# Patient Record
Sex: FEMALE | Race: WHITE | Employment: OTHER | URBAN - METROPOLITAN AREA
[De-identification: names, ages, dates, MRNs, and addresses within clinical notes are randomized per-mention and may not be internally consistent; named-entity substitution may affect disease eponyms.]

---

## 2017-01-06 ENCOUNTER — TELEPHONE (OUTPATIENT)
Dept: INTERNAL MEDICINE CLINIC | Age: 77
End: 2017-01-06

## 2017-01-06 DIAGNOSIS — E78.2 MIXED HYPERLIPIDEMIA: ICD-10-CM

## 2017-01-06 DIAGNOSIS — I25.10 CORONARY ARTERY DISEASE INVOLVING NATIVE CORONARY ARTERY OF NATIVE HEART WITHOUT ANGINA PECTORIS: ICD-10-CM

## 2017-02-13 ENCOUNTER — HOSPITAL ENCOUNTER (OUTPATIENT)
Dept: GENERAL RADIOLOGY | Age: 77
Discharge: HOME | End: 2017-02-13
Attending: FAMILY MEDICINE | Admitting: FAMILY MEDICINE

## 2017-02-13 DIAGNOSIS — M53.3 SACROILIAC PAIN: ICD-10-CM

## 2017-02-13 DIAGNOSIS — Z78.0 POSTMENOPAUSAL: ICD-10-CM

## 2017-02-13 DIAGNOSIS — R20.9 DISTURBANCE OF SKIN SENSATION: ICD-10-CM

## 2017-02-14 ENCOUNTER — TELEPHONE (OUTPATIENT)
Dept: INTERNAL MEDICINE CLINIC | Age: 77
End: 2017-02-14

## 2017-03-10 ENCOUNTER — OFFICE VISIT (OUTPATIENT)
Dept: CARDIOLOGY CLINIC | Age: 77
End: 2017-03-10

## 2017-03-10 VITALS
HEART RATE: 77 BPM | WEIGHT: 227 LBS | HEIGHT: 60 IN | BODY MASS INDEX: 44.57 KG/M2 | SYSTOLIC BLOOD PRESSURE: 130 MMHG | DIASTOLIC BLOOD PRESSURE: 68 MMHG

## 2017-03-10 DIAGNOSIS — I25.10 CORONARY ARTERY DISEASE INVOLVING NATIVE CORONARY ARTERY OF NATIVE HEART WITHOUT ANGINA PECTORIS: Primary | ICD-10-CM

## 2017-03-10 DIAGNOSIS — E78.2 MIXED HYPERLIPIDEMIA: ICD-10-CM

## 2017-03-10 DIAGNOSIS — F17.200 TOBACCO DEPENDENCE: ICD-10-CM

## 2017-03-10 DIAGNOSIS — I10 ESSENTIAL HYPERTENSION: ICD-10-CM

## 2017-03-10 PROCEDURE — G8399 PT W/DXA RESULTS DOCUMENT: HCPCS | Performed by: INTERNAL MEDICINE

## 2017-03-10 PROCEDURE — 4040F PNEUMOC VAC/ADMIN/RCVD: CPT | Performed by: INTERNAL MEDICINE

## 2017-03-10 PROCEDURE — G8484 FLU IMMUNIZE NO ADMIN: HCPCS | Performed by: INTERNAL MEDICINE

## 2017-03-10 PROCEDURE — 1123F ACP DISCUSS/DSCN MKR DOCD: CPT | Performed by: INTERNAL MEDICINE

## 2017-03-10 PROCEDURE — G8417 CALC BMI ABV UP PARAM F/U: HCPCS | Performed by: INTERNAL MEDICINE

## 2017-03-10 PROCEDURE — 4004F PT TOBACCO SCREEN RCVD TLK: CPT | Performed by: INTERNAL MEDICINE

## 2017-03-10 PROCEDURE — G8598 ASA/ANTIPLAT THER USED: HCPCS | Performed by: INTERNAL MEDICINE

## 2017-03-10 PROCEDURE — 99214 OFFICE O/P EST MOD 30 MIN: CPT | Performed by: INTERNAL MEDICINE

## 2017-03-10 PROCEDURE — G8427 DOCREV CUR MEDS BY ELIG CLIN: HCPCS | Performed by: INTERNAL MEDICINE

## 2017-03-10 PROCEDURE — 1090F PRES/ABSN URINE INCON ASSESS: CPT | Performed by: INTERNAL MEDICINE

## 2017-03-10 RX ORDER — METOPROLOL TARTRATE 50 MG/1
50 TABLET, FILM COATED ORAL 2 TIMES DAILY
Qty: 180 TABLET | Refills: 3 | Status: CANCELLED | OUTPATIENT
Start: 2017-03-10

## 2017-03-15 ENCOUNTER — TELEPHONE (OUTPATIENT)
Dept: INTERNAL MEDICINE CLINIC | Age: 77
End: 2017-03-15

## 2017-03-16 ENCOUNTER — OFFICE VISIT (OUTPATIENT)
Dept: INTERNAL MEDICINE CLINIC | Age: 77
End: 2017-03-16

## 2017-03-16 VITALS
DIASTOLIC BLOOD PRESSURE: 72 MMHG | BODY MASS INDEX: 38.93 KG/M2 | HEIGHT: 64 IN | WEIGHT: 228 LBS | SYSTOLIC BLOOD PRESSURE: 124 MMHG | HEART RATE: 72 BPM

## 2017-03-16 DIAGNOSIS — E53.8 B12 DEFICIENCY: ICD-10-CM

## 2017-03-16 DIAGNOSIS — R42 VERTIGO: ICD-10-CM

## 2017-03-16 DIAGNOSIS — E55.9 VITAMIN D DEFICIENCY: ICD-10-CM

## 2017-03-16 DIAGNOSIS — E78.2 MIXED HYPERLIPIDEMIA: ICD-10-CM

## 2017-03-16 DIAGNOSIS — R73.9 HYPERGLYCEMIA: Primary | ICD-10-CM

## 2017-03-16 DIAGNOSIS — R07.89 CHEST WALL PAIN FOLLOWING SURGERY: ICD-10-CM

## 2017-03-16 DIAGNOSIS — F17.200 TOBACCO DEPENDENCE: ICD-10-CM

## 2017-03-16 DIAGNOSIS — R05.3 CHRONIC COUGH: ICD-10-CM

## 2017-03-16 DIAGNOSIS — G89.18 CHEST WALL PAIN FOLLOWING SURGERY: ICD-10-CM

## 2017-03-16 PROCEDURE — 4004F PT TOBACCO SCREEN RCVD TLK: CPT | Performed by: FAMILY MEDICINE

## 2017-03-16 PROCEDURE — G8399 PT W/DXA RESULTS DOCUMENT: HCPCS | Performed by: FAMILY MEDICINE

## 2017-03-16 PROCEDURE — 4040F PNEUMOC VAC/ADMIN/RCVD: CPT | Performed by: FAMILY MEDICINE

## 2017-03-16 PROCEDURE — 1123F ACP DISCUSS/DSCN MKR DOCD: CPT | Performed by: FAMILY MEDICINE

## 2017-03-16 PROCEDURE — G8484 FLU IMMUNIZE NO ADMIN: HCPCS | Performed by: FAMILY MEDICINE

## 2017-03-16 PROCEDURE — G8417 CALC BMI ABV UP PARAM F/U: HCPCS | Performed by: FAMILY MEDICINE

## 2017-03-16 PROCEDURE — G8598 ASA/ANTIPLAT THER USED: HCPCS | Performed by: FAMILY MEDICINE

## 2017-03-16 PROCEDURE — G8427 DOCREV CUR MEDS BY ELIG CLIN: HCPCS | Performed by: FAMILY MEDICINE

## 2017-03-16 PROCEDURE — 99213 OFFICE O/P EST LOW 20 MIN: CPT | Performed by: FAMILY MEDICINE

## 2017-03-16 PROCEDURE — 1090F PRES/ABSN URINE INCON ASSESS: CPT | Performed by: FAMILY MEDICINE

## 2017-03-16 RX ORDER — CLONAZEPAM 0.5 MG/1
TABLET ORAL
Qty: 135 TABLET | Refills: 0 | Status: SHIPPED | OUTPATIENT
Start: 2017-03-16 | End: 2017-06-08 | Stop reason: SDUPTHER

## 2017-03-16 RX ORDER — LANOLIN ALCOHOL/MO/W.PET/CERES
1000 CREAM (GRAM) TOPICAL DAILY
COMMUNITY
End: 2017-03-18 | Stop reason: DRUGHIGH

## 2017-03-16 RX ORDER — BENZONATATE 100 MG/1
100 CAPSULE ORAL 3 TIMES DAILY PRN
Qty: 90 CAPSULE | Refills: 3 | Status: SHIPPED | OUTPATIENT
Start: 2017-03-16 | End: 2017-11-27 | Stop reason: SDUPTHER

## 2017-03-18 PROBLEM — E53.8 B12 DEFICIENCY: Status: ACTIVE | Noted: 2017-03-18

## 2017-03-18 RX ORDER — LANOLIN ALCOHOL/MO/W.PET/CERES
2000 CREAM (GRAM) TOPICAL DAILY
Qty: 30 TABLET | Status: SHIPPED | COMMUNITY
Start: 2017-03-18

## 2017-03-18 ASSESSMENT — ENCOUNTER SYMPTOMS
WHEEZING: 0
SHORTNESS OF BREATH: 0
CHEST TIGHTNESS: 0
COUGH: 0

## 2017-03-21 RX ORDER — MECLIZINE HCL 12.5 MG/1
TABLET ORAL
Qty: 90 TABLET | Refills: 3 | Status: SHIPPED | OUTPATIENT
Start: 2017-03-21 | End: 2017-03-28 | Stop reason: SINTOL

## 2017-03-27 RX ORDER — METOPROLOL TARTRATE 50 MG/1
TABLET, FILM COATED ORAL
Qty: 180 TABLET | Refills: 3 | Status: SHIPPED | OUTPATIENT
Start: 2017-03-27 | End: 2018-03-22 | Stop reason: SDUPTHER

## 2017-03-28 ENCOUNTER — TELEPHONE (OUTPATIENT)
Dept: INTERNAL MEDICINE CLINIC | Age: 77
End: 2017-03-28

## 2017-04-26 RX ORDER — OMEPRAZOLE 20 MG/1
CAPSULE, DELAYED RELEASE ORAL
Qty: 90 CAPSULE | Refills: 2 | Status: SHIPPED | OUTPATIENT
Start: 2017-04-26 | End: 2018-01-21 | Stop reason: SDUPTHER

## 2017-06-02 DIAGNOSIS — E53.8 B12 DEFICIENCY: ICD-10-CM

## 2017-06-02 DIAGNOSIS — R73.9 HYPERGLYCEMIA: ICD-10-CM

## 2017-06-02 DIAGNOSIS — E55.9 VITAMIN D DEFICIENCY: ICD-10-CM

## 2017-06-02 DIAGNOSIS — E78.2 MIXED HYPERLIPIDEMIA: ICD-10-CM

## 2017-06-02 LAB
A/G RATIO: 1.5 (ref 1.1–2.2)
ALBUMIN SERPL-MCNC: 4.2 G/DL (ref 3.4–5)
ALP BLD-CCNC: 115 U/L (ref 40–129)
ALT SERPL-CCNC: 14 U/L (ref 10–40)
ANION GAP SERPL CALCULATED.3IONS-SCNC: 18 MMOL/L (ref 3–16)
AST SERPL-CCNC: 15 U/L (ref 15–37)
BASOPHILS ABSOLUTE: 0.1 K/UL (ref 0–0.2)
BASOPHILS RELATIVE PERCENT: 1 %
BILIRUB SERPL-MCNC: 0.5 MG/DL (ref 0–1)
BUN BLDV-MCNC: 19 MG/DL (ref 7–20)
CALCIUM SERPL-MCNC: 9.7 MG/DL (ref 8.3–10.6)
CHLORIDE BLD-SCNC: 102 MMOL/L (ref 99–110)
CHOLESTEROL, TOTAL: 169 MG/DL (ref 0–199)
CO2: 24 MMOL/L (ref 21–32)
CREAT SERPL-MCNC: 1.1 MG/DL (ref 0.6–1.2)
EOSINOPHILS ABSOLUTE: 0.3 K/UL (ref 0–0.6)
EOSINOPHILS RELATIVE PERCENT: 2.5 %
ESTIMATED AVERAGE GLUCOSE: 134.1 MG/DL
GFR AFRICAN AMERICAN: 58
GFR NON-AFRICAN AMERICAN: 48
GLOBULIN: 2.8 G/DL
GLUCOSE BLD-MCNC: 103 MG/DL (ref 70–99)
HBA1C MFR BLD: 6.3 %
HCT VFR BLD CALC: 42.6 % (ref 36–48)
HDLC SERPL-MCNC: 44 MG/DL (ref 40–60)
HEMOGLOBIN: 13.8 G/DL (ref 12–16)
LDL CHOLESTEROL CALCULATED: 88 MG/DL
LYMPHOCYTES ABSOLUTE: 3.6 K/UL (ref 1–5.1)
LYMPHOCYTES RELATIVE PERCENT: 27.9 %
MCH RBC QN AUTO: 28.3 PG (ref 26–34)
MCHC RBC AUTO-ENTMCNC: 32.3 G/DL (ref 31–36)
MCV RBC AUTO: 87.6 FL (ref 80–100)
MONOCYTES ABSOLUTE: 1.1 K/UL (ref 0–1.3)
MONOCYTES RELATIVE PERCENT: 8.9 %
NEUTROPHILS ABSOLUTE: 7.6 K/UL (ref 1.7–7.7)
NEUTROPHILS RELATIVE PERCENT: 59.7 %
PDW BLD-RTO: 15.8 % (ref 12.4–15.4)
PLATELET # BLD: 331 K/UL (ref 135–450)
PMV BLD AUTO: 8.9 FL (ref 5–10.5)
POTASSIUM SERPL-SCNC: 4.2 MMOL/L (ref 3.5–5.1)
RBC # BLD: 4.87 M/UL (ref 4–5.2)
SODIUM BLD-SCNC: 144 MMOL/L (ref 136–145)
TOTAL PROTEIN: 7 G/DL (ref 6.4–8.2)
TRIGL SERPL-MCNC: 187 MG/DL (ref 0–150)
VITAMIN B-12: >2000 PG/ML (ref 211–911)
VITAMIN D 25-HYDROXY: 21.2 NG/ML
VLDLC SERPL CALC-MCNC: 37 MG/DL
WBC # BLD: 12.8 K/UL (ref 4–11)

## 2017-06-08 ENCOUNTER — OFFICE VISIT (OUTPATIENT)
Dept: INTERNAL MEDICINE CLINIC | Age: 77
End: 2017-06-08

## 2017-06-08 VITALS
DIASTOLIC BLOOD PRESSURE: 72 MMHG | WEIGHT: 225 LBS | BODY MASS INDEX: 38.41 KG/M2 | HEIGHT: 64 IN | OXYGEN SATURATION: 97 % | HEART RATE: 72 BPM | SYSTOLIC BLOOD PRESSURE: 126 MMHG

## 2017-06-08 DIAGNOSIS — M15.9 PRIMARY OSTEOARTHRITIS INVOLVING MULTIPLE JOINTS: ICD-10-CM

## 2017-06-08 DIAGNOSIS — H01.002 BLEPHARITIS OF LOWER EYELIDS OF BOTH EYES, UNSPECIFIED TYPE: ICD-10-CM

## 2017-06-08 DIAGNOSIS — I25.10 CORONARY ARTERY DISEASE INVOLVING NATIVE CORONARY ARTERY OF NATIVE HEART WITHOUT ANGINA PECTORIS: ICD-10-CM

## 2017-06-08 DIAGNOSIS — Z23 NEED FOR PROPHYLACTIC VACCINATION AND INOCULATION AGAINST VARICELLA: ICD-10-CM

## 2017-06-08 DIAGNOSIS — E70.30 ALBINOIDISM (HCC): ICD-10-CM

## 2017-06-08 DIAGNOSIS — I10 ESSENTIAL HYPERTENSION: ICD-10-CM

## 2017-06-08 DIAGNOSIS — H01.005 BLEPHARITIS OF LOWER EYELIDS OF BOTH EYES, UNSPECIFIED TYPE: ICD-10-CM

## 2017-06-08 DIAGNOSIS — J31.0 CHRONIC RHINITIS: ICD-10-CM

## 2017-06-08 DIAGNOSIS — R42 VERTIGO: ICD-10-CM

## 2017-06-08 DIAGNOSIS — Z13.31 POSITIVE DEPRESSION SCREENING: ICD-10-CM

## 2017-06-08 DIAGNOSIS — R73.03 PRE-DIABETES: Primary | ICD-10-CM

## 2017-06-08 DIAGNOSIS — J44.9 CHRONIC OBSTRUCTIVE PULMONARY DISEASE, UNSPECIFIED COPD TYPE (HCC): ICD-10-CM

## 2017-06-08 DIAGNOSIS — R53.1 WEAKNESS: ICD-10-CM

## 2017-06-08 DIAGNOSIS — Z79.899 HIGH RISK MEDICATION USE: ICD-10-CM

## 2017-06-08 PROCEDURE — 4004F PT TOBACCO SCREEN RCVD TLK: CPT | Performed by: FAMILY MEDICINE

## 2017-06-08 PROCEDURE — 99214 OFFICE O/P EST MOD 30 MIN: CPT | Performed by: FAMILY MEDICINE

## 2017-06-08 PROCEDURE — G8427 DOCREV CUR MEDS BY ELIG CLIN: HCPCS | Performed by: FAMILY MEDICINE

## 2017-06-08 PROCEDURE — 4040F PNEUMOC VAC/ADMIN/RCVD: CPT | Performed by: FAMILY MEDICINE

## 2017-06-08 PROCEDURE — 3023F SPIROM DOC REV: CPT | Performed by: FAMILY MEDICINE

## 2017-06-08 PROCEDURE — G8598 ASA/ANTIPLAT THER USED: HCPCS | Performed by: FAMILY MEDICINE

## 2017-06-08 PROCEDURE — G8399 PT W/DXA RESULTS DOCUMENT: HCPCS | Performed by: FAMILY MEDICINE

## 2017-06-08 PROCEDURE — 1090F PRES/ABSN URINE INCON ASSESS: CPT | Performed by: FAMILY MEDICINE

## 2017-06-08 PROCEDURE — 1123F ACP DISCUSS/DSCN MKR DOCD: CPT | Performed by: FAMILY MEDICINE

## 2017-06-08 PROCEDURE — G8417 CALC BMI ABV UP PARAM F/U: HCPCS | Performed by: FAMILY MEDICINE

## 2017-06-08 PROCEDURE — G8926 SPIRO NO PERF OR DOC: HCPCS | Performed by: FAMILY MEDICINE

## 2017-06-08 PROCEDURE — G8431 POS CLIN DEPRES SCRN F/U DOC: HCPCS | Performed by: FAMILY MEDICINE

## 2017-06-08 PROCEDURE — G0444 DEPRESSION SCREEN ANNUAL: HCPCS | Performed by: FAMILY MEDICINE

## 2017-06-08 RX ORDER — FLUTICASONE PROPIONATE 50 MCG
1 SPRAY, SUSPENSION (ML) NASAL DAILY
Qty: 3 BOTTLE | Refills: 3 | Status: SHIPPED | OUTPATIENT
Start: 2017-06-08 | End: 2017-09-01 | Stop reason: SDUPTHER

## 2017-06-08 RX ORDER — ALBUTEROL SULFATE 90 UG/1
2 AEROSOL, METERED RESPIRATORY (INHALATION) EVERY 4 HOURS PRN
Qty: 1 INHALER | Refills: 0 | Status: SHIPPED | OUTPATIENT
Start: 2017-06-08 | End: 2019-03-07 | Stop reason: SDUPTHER

## 2017-06-08 RX ORDER — CLONAZEPAM 0.5 MG/1
TABLET ORAL
Qty: 135 TABLET | Refills: 0 | Status: SHIPPED | OUTPATIENT
Start: 2017-06-08 | End: 2017-09-01 | Stop reason: SDUPTHER

## 2017-06-08 ASSESSMENT — PATIENT HEALTH QUESTIONNAIRE - PHQ9
5. POOR APPETITE OR OVEREATING: 0
10. IF YOU CHECKED OFF ANY PROBLEMS, HOW DIFFICULT HAVE THESE PROBLEMS MADE IT FOR YOU TO DO YOUR WORK, TAKE CARE OF THINGS AT HOME, OR GET ALONG WITH OTHER PEOPLE: 2
1. LITTLE INTEREST OR PLEASURE IN DOING THINGS: 3
SUM OF ALL RESPONSES TO PHQ QUESTIONS 1-9: 6
3. TROUBLE FALLING OR STAYING ASLEEP: 0
4. FEELING TIRED OR HAVING LITTLE ENERGY: 3
9. THOUGHTS THAT YOU WOULD BE BETTER OFF DEAD, OR OF HURTING YOURSELF: 0
8. MOVING OR SPEAKING SO SLOWLY THAT OTHER PEOPLE COULD HAVE NOTICED. OR THE OPPOSITE, BEING SO FIGETY OR RESTLESS THAT YOU HAVE BEEN MOVING AROUND A LOT MORE THAN USUAL: 0
SUM OF ALL RESPONSES TO PHQ9 QUESTIONS 1 & 2: 3
6. FEELING BAD ABOUT YOURSELF - OR THAT YOU ARE A FAILURE OR HAVE LET YOURSELF OR YOUR FAMILY DOWN: 0
7. TROUBLE CONCENTRATING ON THINGS, SUCH AS READING THE NEWSPAPER OR WATCHING TELEVISION: 0

## 2017-06-08 ASSESSMENT — ENCOUNTER SYMPTOMS
SHORTNESS OF BREATH: 0
WHEEZING: 0
CHEST TIGHTNESS: 0
COUGH: 0

## 2017-06-11 PROBLEM — J44.9 CHRONIC OBSTRUCTIVE PULMONARY DISEASE (HCC): Status: ACTIVE | Noted: 2017-06-11

## 2017-06-11 ASSESSMENT — ENCOUNTER SYMPTOMS: BACK PAIN: 1

## 2017-09-01 ENCOUNTER — OFFICE VISIT (OUTPATIENT)
Dept: INTERNAL MEDICINE CLINIC | Age: 77
End: 2017-09-01

## 2017-09-01 VITALS
BODY MASS INDEX: 37.87 KG/M2 | DIASTOLIC BLOOD PRESSURE: 84 MMHG | HEART RATE: 68 BPM | WEIGHT: 220.6 LBS | SYSTOLIC BLOOD PRESSURE: 124 MMHG

## 2017-09-01 DIAGNOSIS — Z79.891 USE OF OPIATES FOR THERAPEUTIC PURPOSES: ICD-10-CM

## 2017-09-01 DIAGNOSIS — M15.9 PRIMARY OSTEOARTHRITIS INVOLVING MULTIPLE JOINTS: ICD-10-CM

## 2017-09-01 DIAGNOSIS — R42 VERTIGO: ICD-10-CM

## 2017-09-01 DIAGNOSIS — J31.0 CHRONIC RHINITIS: ICD-10-CM

## 2017-09-01 DIAGNOSIS — J44.9 CHRONIC OBSTRUCTIVE PULMONARY DISEASE, UNSPECIFIED COPD TYPE (HCC): ICD-10-CM

## 2017-09-01 DIAGNOSIS — H00.011 HORDEOLUM EXTERNUM OF RIGHT UPPER EYELID: Primary | ICD-10-CM

## 2017-09-01 PROCEDURE — G8417 CALC BMI ABV UP PARAM F/U: HCPCS | Performed by: FAMILY MEDICINE

## 2017-09-01 PROCEDURE — 4040F PNEUMOC VAC/ADMIN/RCVD: CPT | Performed by: FAMILY MEDICINE

## 2017-09-01 PROCEDURE — G8926 SPIRO NO PERF OR DOC: HCPCS | Performed by: FAMILY MEDICINE

## 2017-09-01 PROCEDURE — 1123F ACP DISCUSS/DSCN MKR DOCD: CPT | Performed by: FAMILY MEDICINE

## 2017-09-01 PROCEDURE — 1090F PRES/ABSN URINE INCON ASSESS: CPT | Performed by: FAMILY MEDICINE

## 2017-09-01 PROCEDURE — 99213 OFFICE O/P EST LOW 20 MIN: CPT | Performed by: FAMILY MEDICINE

## 2017-09-01 PROCEDURE — 4004F PT TOBACCO SCREEN RCVD TLK: CPT | Performed by: FAMILY MEDICINE

## 2017-09-01 PROCEDURE — G8399 PT W/DXA RESULTS DOCUMENT: HCPCS | Performed by: FAMILY MEDICINE

## 2017-09-01 PROCEDURE — G8427 DOCREV CUR MEDS BY ELIG CLIN: HCPCS | Performed by: FAMILY MEDICINE

## 2017-09-01 PROCEDURE — G8598 ASA/ANTIPLAT THER USED: HCPCS | Performed by: FAMILY MEDICINE

## 2017-09-01 PROCEDURE — 3023F SPIROM DOC REV: CPT | Performed by: FAMILY MEDICINE

## 2017-09-01 RX ORDER — FLUTICASONE PROPIONATE 50 MCG
1 SPRAY, SUSPENSION (ML) NASAL DAILY
Qty: 3 BOTTLE | Refills: 3 | Status: SHIPPED | OUTPATIENT
Start: 2017-09-01 | End: 2018-09-21 | Stop reason: SDUPTHER

## 2017-09-01 RX ORDER — CLONAZEPAM 0.5 MG/1
TABLET ORAL
Qty: 135 TABLET | Refills: 0 | Status: SHIPPED | OUTPATIENT
Start: 2017-09-01 | End: 2017-11-27 | Stop reason: SDUPTHER

## 2017-09-01 RX ORDER — ERYTHROMYCIN 5 MG/G
OINTMENT OPHTHALMIC
Qty: 1 TUBE | Refills: 1 | Status: SHIPPED | OUTPATIENT
Start: 2017-09-01 | End: 2017-09-11

## 2017-09-01 ASSESSMENT — ENCOUNTER SYMPTOMS
NAUSEA: 0
WHEEZING: 0
CHEST TIGHTNESS: 0
SHORTNESS OF BREATH: 0
CONSTIPATION: 0
COUGH: 0
DIARRHEA: 0
VOMITING: 0

## 2017-09-03 PROBLEM — Z79.891 USE OF OPIATES FOR THERAPEUTIC PURPOSES: Status: ACTIVE | Noted: 2017-09-03

## 2017-09-11 ENCOUNTER — OFFICE VISIT (OUTPATIENT)
Dept: CARDIOLOGY CLINIC | Age: 77
End: 2017-09-11

## 2017-09-11 VITALS
WEIGHT: 220 LBS | HEIGHT: 60 IN | SYSTOLIC BLOOD PRESSURE: 124 MMHG | DIASTOLIC BLOOD PRESSURE: 62 MMHG | HEART RATE: 69 BPM | BODY MASS INDEX: 43.19 KG/M2

## 2017-09-11 DIAGNOSIS — F17.200 TOBACCO DEPENDENCE: ICD-10-CM

## 2017-09-11 DIAGNOSIS — I10 ESSENTIAL HYPERTENSION: ICD-10-CM

## 2017-09-11 DIAGNOSIS — I25.10 CORONARY ARTERY DISEASE INVOLVING NATIVE CORONARY ARTERY OF NATIVE HEART WITHOUT ANGINA PECTORIS: Primary | ICD-10-CM

## 2017-09-11 DIAGNOSIS — E78.2 MIXED HYPERLIPIDEMIA: ICD-10-CM

## 2017-09-11 PROCEDURE — 4004F PT TOBACCO SCREEN RCVD TLK: CPT | Performed by: INTERNAL MEDICINE

## 2017-09-11 PROCEDURE — G8427 DOCREV CUR MEDS BY ELIG CLIN: HCPCS | Performed by: INTERNAL MEDICINE

## 2017-09-11 PROCEDURE — 1123F ACP DISCUSS/DSCN MKR DOCD: CPT | Performed by: INTERNAL MEDICINE

## 2017-09-11 PROCEDURE — G8417 CALC BMI ABV UP PARAM F/U: HCPCS | Performed by: INTERNAL MEDICINE

## 2017-09-11 PROCEDURE — 1090F PRES/ABSN URINE INCON ASSESS: CPT | Performed by: INTERNAL MEDICINE

## 2017-09-11 PROCEDURE — 99214 OFFICE O/P EST MOD 30 MIN: CPT | Performed by: INTERNAL MEDICINE

## 2017-09-11 PROCEDURE — G8399 PT W/DXA RESULTS DOCUMENT: HCPCS | Performed by: INTERNAL MEDICINE

## 2017-09-11 PROCEDURE — G8598 ASA/ANTIPLAT THER USED: HCPCS | Performed by: INTERNAL MEDICINE

## 2017-09-11 PROCEDURE — 4040F PNEUMOC VAC/ADMIN/RCVD: CPT | Performed by: INTERNAL MEDICINE

## 2017-10-10 RX ORDER — VALSARTAN AND HYDROCHLOROTHIAZIDE 320; 25 MG/1; MG/1
TABLET, FILM COATED ORAL
Qty: 90 TABLET | Refills: 1 | Status: SHIPPED | OUTPATIENT
Start: 2017-10-10 | End: 2018-04-08 | Stop reason: SDUPTHER

## 2017-10-19 ENCOUNTER — OFFICE VISIT (OUTPATIENT)
Dept: INTERNAL MEDICINE CLINIC | Age: 77
End: 2017-10-19

## 2017-10-19 VITALS
DIASTOLIC BLOOD PRESSURE: 78 MMHG | WEIGHT: 221 LBS | HEART RATE: 72 BPM | BODY MASS INDEX: 43.39 KG/M2 | SYSTOLIC BLOOD PRESSURE: 126 MMHG | HEIGHT: 60 IN

## 2017-10-19 DIAGNOSIS — Z01.818 PREOP EXAMINATION: Primary | ICD-10-CM

## 2017-10-19 DIAGNOSIS — H25.9 AGE-RELATED CATARACT OF BOTH EYES, UNSPECIFIED AGE-RELATED CATARACT TYPE: ICD-10-CM

## 2017-10-19 PROCEDURE — G8417 CALC BMI ABV UP PARAM F/U: HCPCS | Performed by: NURSE PRACTITIONER

## 2017-10-19 PROCEDURE — 1090F PRES/ABSN URINE INCON ASSESS: CPT | Performed by: NURSE PRACTITIONER

## 2017-10-19 PROCEDURE — 1123F ACP DISCUSS/DSCN MKR DOCD: CPT | Performed by: NURSE PRACTITIONER

## 2017-10-19 PROCEDURE — G8399 PT W/DXA RESULTS DOCUMENT: HCPCS | Performed by: NURSE PRACTITIONER

## 2017-10-19 PROCEDURE — G8427 DOCREV CUR MEDS BY ELIG CLIN: HCPCS | Performed by: NURSE PRACTITIONER

## 2017-10-19 PROCEDURE — 99214 OFFICE O/P EST MOD 30 MIN: CPT | Performed by: NURSE PRACTITIONER

## 2017-10-19 PROCEDURE — 4004F PT TOBACCO SCREEN RCVD TLK: CPT | Performed by: NURSE PRACTITIONER

## 2017-10-19 PROCEDURE — G8484 FLU IMMUNIZE NO ADMIN: HCPCS | Performed by: NURSE PRACTITIONER

## 2017-10-19 PROCEDURE — G8598 ASA/ANTIPLAT THER USED: HCPCS | Performed by: NURSE PRACTITIONER

## 2017-10-19 PROCEDURE — 4040F PNEUMOC VAC/ADMIN/RCVD: CPT | Performed by: NURSE PRACTITIONER

## 2017-10-19 NOTE — PROGRESS NOTES
Preoperative Consultation      Adelaida Young  YOB: 1940    Date of Service:  10/19/2017    This patient presents today at the request of the surgeon for a preoperative consultation. Surgeon: Dr. Telma Epstein  Indication for surgery: Bilateral cataract  Scheduled procedure: Bilateral cataract  Date of surgery: 10/25/17 (right), TBD (left)  Location of surgery: [facility]  Indicated/required preoperative testing: H&P  Smoker: Yes  Previous anesthesia complications: No    Family history of anesthesia complications: No  Loose, capped or false teeth: Yes - upper & lower full plates  Recent chest pain or SOB: No  Known Bleeding Risk: No recent or remote history of abnormal bleeding. Anticoagulant therapy- ASA  Personal or FH of DVT/PE: No    Patient objection to receiving blood products: No      Allergies   Allergen Reactions    Pcn [Penicillins] Hives    Tetanus Toxoids Itching    Statins Rash     Rash with Lipitor, Crestor, Zocor;  Probably pravastatin. Current Outpatient Prescriptions   Medication Sig Dispense Refill    valsartan-hydrochlorothiazide (DIOVAN-HCT) 320-25 MG per tablet TAKE 1 TABLET DAILY 90 tablet 1    fluticasone (FLONASE) 50 MCG/ACT nasal spray 1 spray by Nasal route daily 3 Bottle 3    clonazePAM (KLONOPIN) 0.5 MG tablet Take 1 tablet at night and 1/2 tablet during day as needed. 135 tablet 0    traMADol-acetaminophen (ULTRACET) 37.5-325 MG per tablet 1 every 4 hours as needed and if no response, can take 2 at once every 8 hours.  120 tablet 0    pitavastatin (LIVALO) 4 MG TABS tablet Take 1 tablet by mouth nightly 90 tablet 1    albuterol sulfate HFA (PROAIR HFA) 108 (90 BASE) MCG/ACT inhaler Inhale 2 puffs into the lungs every 4 hours as needed for Wheezing (cough, shortness breath or wheezing.) 1 Inhaler 0    omeprazole (PRILOSEC) 20 MG delayed release capsule TAKE 1 CAPSULE DAILY 90 capsule 2    metoprolol tartrate (LOPRESSOR) 50 MG tablet TAKE 1 TABLET TWICE A  tablet 3    vitamin B-12 (CYANOCOBALAMIN) 1000 MCG tablet Take 2 tablets by mouth daily 30 tablet     benzonatate (TESSALON PERLES) 100 MG capsule Take 1 capsule by mouth 3 times daily as needed for Cough 90 capsule 3    naproxen (NAPROSYN) 375 MG tablet Take 1 twice a day as needed for arthritis pain. Best to take with food. 60 tablet 2    aspirin 81 MG tablet Take 81 mg by mouth daily      nitroGLYCERIN (NITROSTAT) 0.4 MG SL tablet Place 1 tablet under the tongue every 5 minutes as needed for Chest pain 25 tablet 0    promethazine (PHENERGAN) 25 MG tablet Take 0.5 tablets by mouth every 6 hours as needed for Nausea 60 tablet 3    Omega-3 Krill Oil 300 MG CAPS Take 1 tablet by mouth daily        No current facility-administered medications for this visit.         Patient Active Problem List   Diagnosis    Pre-diabetes    Hyperlipidemia    [facility] [address]    CAD (coronary artery disease)    Hearing impaired    Vertigo    GERD (gastroesophageal reflux disease)    Intertrigo    Abnormal EKG    HTN (hypertension)    High risk medication use    History of melanoma    Iron deficiency anemia    Tobacco dependence    Chest wall pain following surgery    Sacroiliac pain    Chronic rhinitis    B12 deficiency    Blepharitis of lower eyelids of both eyes    Chronic obstructive pulmonary disease (HCC)    Use of opiates for therapeutic purposes       Past Medical History:   Diagnosis Date    [facility]ism ([facility] [address]     CAD (coronary artery disease)     COPD (chronic obstructive pulmonary disease) ([facility] [facility]     Diabetes mellitus ([facility] [address] [facility]     Hearing impaired     Hiatal hernia     Hyperlipidemia     true allergy to statins    Hypertension     Kidney stone 2/3/2013    Malignant melanoma ([facility] [address]     Osteoarthritis     Pneumonia     Stented coronary artery        Past Surgical History:   Procedure Laterality Date    COLONOSCOPY      CORONARY ANGIOPLASTY WITH STENT PLACEMENT      CORONARY ARTERY BYPASS GRAFT 03/17/15    Dr. Barba Sensor - x 3 LIMA-LAD, SV-OM2, SV-RCA    ENDOSCOPY, COLON, DIAGNOSTIC      HYSTERECTOMY  1987    KNEE SURGERY      SKIN BIOPSY      arm,back, upper left arm    SKIN CANCER EXCISION  <2000    Left upper arm;  Melanoma    STAPEDES SURGERY  [id number]    Dr. Missy Bustillos       Family History   Problem Relation Age of Onset    Cancer Other      very strong in family    Esophageal Cancer Son     Seizures Daughter          Review of Systems  A comprehensive review of systems was negative except for what was noted in the HPI. Physical Exam   Vitals:    10/19/17 1332   BP: 126/78   Pulse: 72   Weight: 221 lb (100.2 kg)   Height: 5' (1.524 m)        Constitutional: She is oriented to person, place, and time. She appears well-developed and well-nourished. No distress. HENT:   Head: Normocephalic and atraumatic. Mouth/Throat: Uvula is midline, oropharynx is clear and moist and mucous membranes are normal.   Eyes: Conjunctivae and EOM are normal.   Neck: Trachea normal and normal range of motion. Neck supple. Cardiovascular: Normal rate, regular rhythm, normal heart sounds and intact distal pulses. Pulmonary/Chest: Effort normal and breath sounds normal. No respiratory distress. She has no wheezes. She has no rales. Abdominal: Soft, non-tender. Bowel sounds are normal.   Musculoskeletal: She exhibits no edema and no tenderness. Neurological: She is alert and oriented to person, place, and time. Skin: Skin is warm and dry. No rash noted. No erythema. Psychiatric: She has a normal mood and affect.  Her behavior is normal.       EKG Interpretation: n/a  Lab Review:  Lab Results   Component Value Date     06/02/2017    K 4.2 06/02/2017     06/02/2017    CO2 24 06/02/2017    BUN 19 06/02/2017    CREATININE 1.1 06/02/2017    GLUCOSE 103 06/02/2017    CALCIUM 9.7 06/02/2017     Lab Results   Component Value Date    WBC 12.8 06/02/2017    HGB 13.8 06/02/2017    HCT 42.6 06/02/2017

## 2017-11-27 ENCOUNTER — OFFICE VISIT (OUTPATIENT)
Dept: INTERNAL MEDICINE CLINIC | Age: 77
End: 2017-11-27

## 2017-11-27 VITALS
DIASTOLIC BLOOD PRESSURE: 68 MMHG | WEIGHT: 219 LBS | HEART RATE: 64 BPM | HEIGHT: 60 IN | SYSTOLIC BLOOD PRESSURE: 110 MMHG | BODY MASS INDEX: 43 KG/M2

## 2017-11-27 DIAGNOSIS — E66.01 MORBID OBESITY (HCC): ICD-10-CM

## 2017-11-27 DIAGNOSIS — Z23 NEED FOR PNEUMOCOCCAL VACCINATION: ICD-10-CM

## 2017-11-27 DIAGNOSIS — J44.9 COPD, MILD (HCC): Primary | ICD-10-CM

## 2017-11-27 DIAGNOSIS — R73.09 IMPAIRED GLUCOSE METABOLISM: ICD-10-CM

## 2017-11-27 DIAGNOSIS — Z23 INFLUENZA VACCINE NEEDED: ICD-10-CM

## 2017-11-27 DIAGNOSIS — R10.9 FLANK PAIN: ICD-10-CM

## 2017-11-27 DIAGNOSIS — E78.2 MIXED HYPERLIPIDEMIA: ICD-10-CM

## 2017-11-27 DIAGNOSIS — I10 ESSENTIAL HYPERTENSION: ICD-10-CM

## 2017-11-27 DIAGNOSIS — R42 VERTIGO: ICD-10-CM

## 2017-11-27 DIAGNOSIS — Z79.891 USE OF OPIATES FOR THERAPEUTIC PURPOSES: ICD-10-CM

## 2017-11-27 DIAGNOSIS — R05.3 CHRONIC COUGH: ICD-10-CM

## 2017-11-27 DIAGNOSIS — F32.A DEPRESSION, UNSPECIFIED DEPRESSION TYPE: ICD-10-CM

## 2017-11-27 LAB
A/G RATIO: 1.6 (ref 1.1–2.2)
ALBUMIN SERPL-MCNC: 4.4 G/DL (ref 3.4–5)
ALP BLD-CCNC: 117 U/L (ref 40–129)
ALT SERPL-CCNC: 21 U/L (ref 10–40)
ANION GAP SERPL CALCULATED.3IONS-SCNC: 18 MMOL/L (ref 3–16)
AST SERPL-CCNC: 17 U/L (ref 15–37)
BACTERIA: ABNORMAL /HPF
BASOPHILS ABSOLUTE: 0.1 K/UL (ref 0–0.2)
BASOPHILS RELATIVE PERCENT: 0.9 %
BILIRUB SERPL-MCNC: 0.4 MG/DL (ref 0–1)
BILIRUBIN URINE: NEGATIVE
BLOOD, URINE: NEGATIVE
BUN BLDV-MCNC: 21 MG/DL (ref 7–20)
CALCIUM SERPL-MCNC: 9.9 MG/DL (ref 8.3–10.6)
CHLORIDE BLD-SCNC: 101 MMOL/L (ref 99–110)
CLARITY: ABNORMAL
CO2: 25 MMOL/L (ref 21–32)
COLOR: YELLOW
CREAT SERPL-MCNC: 1.1 MG/DL (ref 0.6–1.2)
EOSINOPHILS ABSOLUTE: 0.2 K/UL (ref 0–0.6)
EOSINOPHILS RELATIVE PERCENT: 1.9 %
EPITHELIAL CELLS, UA: 2 /HPF (ref 0–5)
GFR AFRICAN AMERICAN: 58
GFR NON-AFRICAN AMERICAN: 48
GLOBULIN: 2.7 G/DL
GLUCOSE BLD-MCNC: 126 MG/DL (ref 70–99)
GLUCOSE URINE: NEGATIVE MG/DL
HCT VFR BLD CALC: 42.6 % (ref 36–48)
HEMOGLOBIN: 14 G/DL (ref 12–16)
HYALINE CASTS: 1 /LPF (ref 0–8)
KETONES, URINE: NEGATIVE MG/DL
LDL CHOLESTEROL DIRECT: 103 MG/DL
LEUKOCYTE ESTERASE, URINE: NEGATIVE
LYMPHOCYTES ABSOLUTE: 4.3 K/UL (ref 1–5.1)
LYMPHOCYTES RELATIVE PERCENT: 32.6 %
MCH RBC QN AUTO: 29.1 PG (ref 26–34)
MCHC RBC AUTO-ENTMCNC: 32.9 G/DL (ref 31–36)
MCV RBC AUTO: 88.5 FL (ref 80–100)
MICROSCOPIC EXAMINATION: YES
MONOCYTES ABSOLUTE: 1.1 K/UL (ref 0–1.3)
MONOCYTES RELATIVE PERCENT: 8 %
NEUTROPHILS ABSOLUTE: 7.5 K/UL (ref 1.7–7.7)
NEUTROPHILS RELATIVE PERCENT: 56.6 %
NITRITE, URINE: NEGATIVE
PDW BLD-RTO: 15.8 % (ref 12.4–15.4)
PH UA: 5.5
PLATELET # BLD: 322 K/UL (ref 135–450)
PMV BLD AUTO: 8.6 FL (ref 5–10.5)
POTASSIUM SERPL-SCNC: 4.5 MMOL/L (ref 3.5–5.1)
PROTEIN UA: NEGATIVE MG/DL
RBC # BLD: 4.82 M/UL (ref 4–5.2)
RBC UA: 6 /HPF (ref 0–4)
SODIUM BLD-SCNC: 144 MMOL/L (ref 136–145)
SPECIFIC GRAVITY UA: 1.01
TOTAL PROTEIN: 7.1 G/DL (ref 6.4–8.2)
UROBILINOGEN, URINE: 0.2 E.U./DL
WBC # BLD: 13.3 K/UL (ref 4–11)
WBC UA: 2 /HPF (ref 0–5)

## 2017-11-27 PROCEDURE — 4040F PNEUMOC VAC/ADMIN/RCVD: CPT | Performed by: FAMILY MEDICINE

## 2017-11-27 PROCEDURE — G8399 PT W/DXA RESULTS DOCUMENT: HCPCS | Performed by: FAMILY MEDICINE

## 2017-11-27 PROCEDURE — G8484 FLU IMMUNIZE NO ADMIN: HCPCS | Performed by: FAMILY MEDICINE

## 2017-11-27 PROCEDURE — 1123F ACP DISCUSS/DSCN MKR DOCD: CPT | Performed by: FAMILY MEDICINE

## 2017-11-27 PROCEDURE — G8926 SPIRO NO PERF OR DOC: HCPCS | Performed by: FAMILY MEDICINE

## 2017-11-27 PROCEDURE — 4004F PT TOBACCO SCREEN RCVD TLK: CPT | Performed by: FAMILY MEDICINE

## 2017-11-27 PROCEDURE — G8598 ASA/ANTIPLAT THER USED: HCPCS | Performed by: FAMILY MEDICINE

## 2017-11-27 PROCEDURE — 90732 PPSV23 VACC 2 YRS+ SUBQ/IM: CPT | Performed by: FAMILY MEDICINE

## 2017-11-27 PROCEDURE — G0009 ADMIN PNEUMOCOCCAL VACCINE: HCPCS | Performed by: FAMILY MEDICINE

## 2017-11-27 PROCEDURE — 3023F SPIROM DOC REV: CPT | Performed by: FAMILY MEDICINE

## 2017-11-27 PROCEDURE — G8427 DOCREV CUR MEDS BY ELIG CLIN: HCPCS | Performed by: FAMILY MEDICINE

## 2017-11-27 PROCEDURE — G8417 CALC BMI ABV UP PARAM F/U: HCPCS | Performed by: FAMILY MEDICINE

## 2017-11-27 PROCEDURE — G0008 ADMIN INFLUENZA VIRUS VAC: HCPCS | Performed by: FAMILY MEDICINE

## 2017-11-27 PROCEDURE — 99214 OFFICE O/P EST MOD 30 MIN: CPT | Performed by: FAMILY MEDICINE

## 2017-11-27 PROCEDURE — 90662 IIV NO PRSV INCREASED AG IM: CPT | Performed by: FAMILY MEDICINE

## 2017-11-27 PROCEDURE — 1090F PRES/ABSN URINE INCON ASSESS: CPT | Performed by: FAMILY MEDICINE

## 2017-11-27 RX ORDER — DULOXETIN HYDROCHLORIDE 30 MG/1
CAPSULE, DELAYED RELEASE ORAL
Qty: 30 CAPSULE | Refills: 0 | Status: SHIPPED | OUTPATIENT
Start: 2017-11-27 | End: 2018-01-12 | Stop reason: DRUGHIGH

## 2017-11-27 RX ORDER — DULOXETIN HYDROCHLORIDE 60 MG/1
60 CAPSULE, DELAYED RELEASE ORAL DAILY
Qty: 30 CAPSULE | Refills: 2 | Status: SHIPPED | OUTPATIENT
Start: 2017-12-19 | End: 2018-01-12 | Stop reason: SDUPTHER

## 2017-11-27 RX ORDER — CLONAZEPAM 0.5 MG/1
TABLET ORAL
Qty: 135 TABLET | Refills: 0 | Status: SHIPPED | OUTPATIENT
Start: 2017-11-27 | End: 2018-06-29 | Stop reason: SDUPTHER

## 2017-11-27 RX ORDER — DULOXETIN HYDROCHLORIDE 30 MG/1
CAPSULE, DELAYED RELEASE ORAL
Qty: 30 CAPSULE | Refills: 0 | Status: SHIPPED | OUTPATIENT
Start: 2017-11-27 | End: 2017-11-27 | Stop reason: SDUPTHER

## 2017-11-27 RX ORDER — BENZONATATE 100 MG/1
100 CAPSULE ORAL 3 TIMES DAILY PRN
Qty: 270 CAPSULE | Refills: 0 | Status: SHIPPED | OUTPATIENT
Start: 2017-11-27 | End: 2018-12-13 | Stop reason: SDUPTHER

## 2017-11-27 ASSESSMENT — ENCOUNTER SYMPTOMS
VOMITING: 0
SHORTNESS OF BREATH: 0
CHEST TIGHTNESS: 0
CONSTIPATION: 0
DIARRHEA: 0
WHEEZING: 0
COUGH: 0
NAUSEA: 0

## 2017-11-27 NOTE — PROGRESS NOTES
benzonatate (TESSALON PERLES) 100 MG capsule; Take 1 capsule by mouth 3 times daily as needed for Cough  Dispense: 270 capsule; Refill: 0    4. Flank pain  She mentions this as new issue. She thinks it is her kidney. The way she flinches with movement, I suspect more MS.    - Urinalysis with Microscopic  - Urine Culture    5. Impaired glucose metabolism  Weight loss advised. - Hemoglobin A1C  - Comprehensive Metabolic Panel    6. Mixed hyperlipidemia  Above goal for CAD. Statin use and compliance encouraged. - LDL Cholesterol, Direct    7. Use of opiates for therapeutic purposes  Controlled Substances Monitoring:     Attestation: The Prescription Monitoring Report for this patient was reviewed today. Adina Gamez MD)  Documentation: No signs of potential drug abuse or diversion identified., Existing medication contract. Adina Gamez MD)      8. Essential hypertension  BP: 110/68   At goal and meeting medical guidelines. Continue treatment. 9. Need for pneumococcal vaccination  Dwp. I have no documentation of this being given and she is unable to track old dates if given. No memory of this. - Pneumococcal polysaccharide vaccine 23-valent >= 1yo subcutaneous/IM (PNEUMOVAX 23)  - INFLUENZA, HIGH DOSE, 65 YRS +, IM, PF, PREFILL SYR, 0.5ML (FLUZONE HD)    10. Influenza vaccine needed  Vaccine information statement given. Risk and benefits of vaccine(s) given discussed with patient and questions answered. - INFLUENZA, HIGH DOSE, 65 YRS +, IM, PF, PREFILL SYR, 0.5ML (FLUZONE HD)    11. Vertigo  Daily use of medication. Uses 1 per day on average with rare additional tablet used. - clonazePAM (KLONOPIN) 0.5 MG tablet; Take 1 tablet at night and 1/2 tablet during day as needed. .  Dispense: 135 tablet; Refill: 0    12. Depression, unspecified depression type  Discussed treatment and suggest she at least try medication. Could also help her pain and motivation.   Consider Wellbutrin or Zyban

## 2017-11-28 LAB
ESTIMATED AVERAGE GLUCOSE: 137 MG/DL
HBA1C MFR BLD: 6.4 %

## 2017-11-29 ENCOUNTER — TELEPHONE (OUTPATIENT)
Dept: INTERNAL MEDICINE CLINIC | Age: 77
End: 2017-11-29

## 2017-11-29 LAB — URINE CULTURE, ROUTINE: NORMAL

## 2017-12-09 DIAGNOSIS — I25.10 CORONARY ARTERY DISEASE INVOLVING NATIVE CORONARY ARTERY OF NATIVE HEART WITHOUT ANGINA PECTORIS: ICD-10-CM

## 2017-12-11 RX ORDER — PITAVASTATIN CALCIUM 4.18 MG/1
TABLET, FILM COATED ORAL
Qty: 90 TABLET | Refills: 1 | Status: SHIPPED | OUTPATIENT
Start: 2017-12-11 | End: 2018-06-07 | Stop reason: SDUPTHER

## 2018-01-12 ENCOUNTER — OFFICE VISIT (OUTPATIENT)
Dept: INTERNAL MEDICINE CLINIC | Age: 78
End: 2018-01-12

## 2018-01-12 VITALS
DIASTOLIC BLOOD PRESSURE: 70 MMHG | SYSTOLIC BLOOD PRESSURE: 130 MMHG | HEART RATE: 68 BPM | BODY MASS INDEX: 42.8 KG/M2 | HEIGHT: 60 IN | WEIGHT: 218 LBS

## 2018-01-12 DIAGNOSIS — Z79.899 CHRONIC USE OF BENZODIAZEPINE FOR THERAPEUTIC PURPOSE: ICD-10-CM

## 2018-01-12 DIAGNOSIS — R42 VERTIGO: ICD-10-CM

## 2018-01-12 DIAGNOSIS — F33.0 MILD EPISODE OF RECURRENT MAJOR DEPRESSIVE DISORDER (HCC): Primary | ICD-10-CM

## 2018-01-12 PROCEDURE — 4040F PNEUMOC VAC/ADMIN/RCVD: CPT | Performed by: FAMILY MEDICINE

## 2018-01-12 PROCEDURE — G8598 ASA/ANTIPLAT THER USED: HCPCS | Performed by: FAMILY MEDICINE

## 2018-01-12 PROCEDURE — G8427 DOCREV CUR MEDS BY ELIG CLIN: HCPCS | Performed by: FAMILY MEDICINE

## 2018-01-12 PROCEDURE — G8399 PT W/DXA RESULTS DOCUMENT: HCPCS | Performed by: FAMILY MEDICINE

## 2018-01-12 PROCEDURE — G8417 CALC BMI ABV UP PARAM F/U: HCPCS | Performed by: FAMILY MEDICINE

## 2018-01-12 PROCEDURE — 1123F ACP DISCUSS/DSCN MKR DOCD: CPT | Performed by: FAMILY MEDICINE

## 2018-01-12 PROCEDURE — 1090F PRES/ABSN URINE INCON ASSESS: CPT | Performed by: FAMILY MEDICINE

## 2018-01-12 PROCEDURE — G8484 FLU IMMUNIZE NO ADMIN: HCPCS | Performed by: FAMILY MEDICINE

## 2018-01-12 PROCEDURE — 4004F PT TOBACCO SCREEN RCVD TLK: CPT | Performed by: FAMILY MEDICINE

## 2018-01-12 PROCEDURE — 99213 OFFICE O/P EST LOW 20 MIN: CPT | Performed by: FAMILY MEDICINE

## 2018-01-12 RX ORDER — DULOXETIN HYDROCHLORIDE 60 MG/1
60 CAPSULE, DELAYED RELEASE ORAL DAILY
Qty: 90 CAPSULE | Refills: 3 | Status: SHIPPED | OUTPATIENT
Start: 2018-01-12 | End: 2018-12-20 | Stop reason: SDUPTHER

## 2018-01-12 RX ORDER — M-VIT,TX,IRON,MINS/CALC/FOLIC 27MG-0.4MG
1 TABLET ORAL DAILY
COMMUNITY

## 2018-01-12 ASSESSMENT — ENCOUNTER SYMPTOMS
COUGH: 0
WHEEZING: 0
SHORTNESS OF BREATH: 0
CHEST TIGHTNESS: 0

## 2018-01-12 NOTE — PROGRESS NOTES
 benzonatate (TESSALON PERLES) 100 MG capsule Take 1 capsule by mouth 3 times daily as needed for Cough 270 capsule 0    clonazePAM (KLONOPIN) 0.5 MG tablet Take 1 tablet at night and 1/2 tablet during day as needed. . 135 tablet 0    valsartan-hydrochlorothiazide (DIOVAN-HCT) 320-25 MG per tablet TAKE 1 TABLET DAILY 90 tablet 1    fluticasone (FLONASE) 50 MCG/ACT nasal spray 1 spray by Nasal route daily 3 Bottle 3    traMADol-acetaminophen (ULTRACET) 37.5-325 MG per tablet 1 every 4 hours as needed and if no response, can take 2 at once every 8 hours. 120 tablet 0    albuterol sulfate HFA (PROAIR HFA) 108 (90 BASE) MCG/ACT inhaler Inhale 2 puffs into the lungs every 4 hours as needed for Wheezing (cough, shortness breath or wheezing.) 1 Inhaler 0    omeprazole (PRILOSEC) 20 MG delayed release capsule TAKE 1 CAPSULE DAILY 90 capsule 2    metoprolol tartrate (LOPRESSOR) 50 MG tablet TAKE 1 TABLET TWICE A  tablet 3    vitamin B-12 (CYANOCOBALAMIN) 1000 MCG tablet Take 2 tablets by mouth daily 30 tablet     naproxen (NAPROSYN) 375 MG tablet Take 1 twice a day as needed for arthritis pain. Best to take with food. 60 tablet 2    aspirin 81 MG tablet Take 81 mg by mouth daily      Omega-3 Krill Oil 300 MG CAPS Take 1 tablet by mouth daily          Social History   Substance Use Topics    Smoking status: Current Every Day Smoker     Packs/day: 1.00     Types: Cigarettes    Smokeless tobacco: Never Used      Comment: cut back to 15 cigs per day;  history of over 60 pk yrs.  Alcohol use No             Review of Systems   Constitutional: Negative for unexpected weight change. Respiratory: Negative for cough, chest tightness, shortness of breath and wheezing. Cardiovascular: Negative for chest pain, palpitations and leg swelling. Endocrine: Negative for polyphagia. Musculoskeletal: Negative for gait problem and neck stiffness. Skin: Negative for rash and wound.    Neurological: Negative for dizziness, seizures, syncope, facial asymmetry, speech difficulty, weakness, numbness and headaches. Psychiatric/Behavioral: Negative for behavioral problems and confusion. Objective:   Physical Exam   Constitutional: She appears well-developed and well-nourished. Smells of tobacco   Cardiovascular: Normal rate, regular rhythm, normal heart sounds and intact distal pulses. Pulmonary/Chest: Effort normal. No respiratory distress. She has decreased breath sounds. She has no wheezes. She has no rales. Skin: Skin is warm and dry. No erythema. No pallor. Psychiatric: She has a normal mood and affect. Her behavior is normal. Cognition and memory are normal.   Vitals reviewed. Wt Readings from Last 3 Encounters:   01/12/18 218 lb (98.9 kg)   11/27/17 219 lb (99.3 kg)   10/19/17 221 lb (100.2 kg)     Temp Readings from Last 3 Encounters:   12/08/17 98.3 °F (36.8 °C) (Oral)   12/02/16 98.1 °F (36.7 °C)   05/21/15 98 °F (36.7 °C) (Oral)     BP Readings from Last 3 Encounters:   01/12/18 130/70   12/08/17 135/70   11/27/17 110/68     Pulse Readings from Last 3 Encounters:   01/12/18 68   12/08/17 56   11/27/17 64         Assessment:       1. Depression, mild and appears to be responsive to Cymbalta. dwp to resume medication and stay on longer than a few weeks. - DULoxetine (CYMBALTA) 60 MG extended release capsule; Take 1 capsule by mouth daily For arthritis pain and mood  Dispense: 90 capsule; Refill: 3    2. Vertigo  Controlled with prn clonazepam    3. Chronic use of benzodiazepine for therapeutic purpose  Controlled Substances Monitoring:     Attestation: The Prescription Monitoring Report for this patient was reviewed today. Josse Duarte MD)  Documentation: No signs of potential drug abuse or diversion identified. , Possible medication side effects, risk of tolerance and/or dependence, and alternative treatments discussed.  Josse Duarte MD)  Medication Contracts: Existing medication contract. Katherine Frias MD)            Plan:      See assessment and/or orders. See after visit summary, patient instructions, and reference hand-outs. Discussed use, benefit, and side effects of prescribed medications. Barriers to medication compliance addressed. All patient questions answered.

## 2018-01-22 RX ORDER — OMEPRAZOLE 20 MG/1
CAPSULE, DELAYED RELEASE ORAL
Qty: 90 CAPSULE | Refills: 2 | Status: SHIPPED | OUTPATIENT
Start: 2018-01-22 | End: 2018-10-21 | Stop reason: SDUPTHER

## 2018-03-15 ENCOUNTER — TELEPHONE (OUTPATIENT)
Dept: RHEUMATOLOGY | Age: 78
End: 2018-03-15

## 2018-03-16 ENCOUNTER — OFFICE VISIT (OUTPATIENT)
Dept: CARDIOLOGY CLINIC | Age: 78
End: 2018-03-16

## 2018-03-16 VITALS
HEIGHT: 60 IN | BODY MASS INDEX: 43.39 KG/M2 | HEART RATE: 60 BPM | WEIGHT: 221 LBS | SYSTOLIC BLOOD PRESSURE: 136 MMHG | DIASTOLIC BLOOD PRESSURE: 66 MMHG

## 2018-03-16 DIAGNOSIS — E78.00 HYPERCHOLESTEREMIA: ICD-10-CM

## 2018-03-16 DIAGNOSIS — I10 ESSENTIAL HYPERTENSION: ICD-10-CM

## 2018-03-16 DIAGNOSIS — I25.10 CORONARY ARTERY DISEASE INVOLVING NATIVE CORONARY ARTERY OF NATIVE HEART WITHOUT ANGINA PECTORIS: Primary | ICD-10-CM

## 2018-03-16 PROCEDURE — G8417 CALC BMI ABV UP PARAM F/U: HCPCS | Performed by: INTERNAL MEDICINE

## 2018-03-16 PROCEDURE — G8427 DOCREV CUR MEDS BY ELIG CLIN: HCPCS | Performed by: INTERNAL MEDICINE

## 2018-03-16 PROCEDURE — 99214 OFFICE O/P EST MOD 30 MIN: CPT | Performed by: INTERNAL MEDICINE

## 2018-03-16 PROCEDURE — G8598 ASA/ANTIPLAT THER USED: HCPCS | Performed by: INTERNAL MEDICINE

## 2018-03-16 PROCEDURE — G8482 FLU IMMUNIZE ORDER/ADMIN: HCPCS | Performed by: INTERNAL MEDICINE

## 2018-03-16 PROCEDURE — 1090F PRES/ABSN URINE INCON ASSESS: CPT | Performed by: INTERNAL MEDICINE

## 2018-03-16 PROCEDURE — G8399 PT W/DXA RESULTS DOCUMENT: HCPCS | Performed by: INTERNAL MEDICINE

## 2018-03-16 PROCEDURE — 4004F PT TOBACCO SCREEN RCVD TLK: CPT | Performed by: INTERNAL MEDICINE

## 2018-03-16 PROCEDURE — 4040F PNEUMOC VAC/ADMIN/RCVD: CPT | Performed by: INTERNAL MEDICINE

## 2018-03-16 PROCEDURE — 1123F ACP DISCUSS/DSCN MKR DOCD: CPT | Performed by: INTERNAL MEDICINE

## 2018-03-16 NOTE — LETTER
[address] [facility]  [address]  [address] [address]  Phone: [phone]  Fax: [phone]    Chris Burns MD        March 16, 2018     Lily Sim, [address] [facility],Fourth Floor [address]    Patient: Khoa Acosta  MR Number: [id number]  YOB: 1940  Date of Visit: 3/16/2018    Dear Dr. Lily Sim: Thank you for the request for consultation for Mindy Acosta to me for the evaluation of CAD. Below are the relevant portions of my assessment and plan of care. [address]       H+P // CONSULT // OUTPATIENT VISIT // [facility]     Referring Doctor Lily Sim MD   Encounter Type Followup     CHIEF COMPLAINT     VisitType [] Acute [x] Chronic     Symptom [] None [] CP [] SOB [] Dizzy [] Palps [] Fatigue     Problems CAD s/p CABG 3/15, HTN, CHOL, TOB      HISTORY OF PRESENT ILLNESS     Doing well. Denies cp, sob. Compliant with meds. Continues to smoke. Symptom Y N Frequency Duration Severity Modifying Assoc Sx Other   CP [] [x]         SOB [] [x]         Dizzy [] [x]         Syncope [] [x]         Palpitations [] [x]           COMPLIANCE     Category Meds Diet Salt Exercise Tobacco Alcohol Drugs   Compliant [x] [] [] [] [] [x] [x]   [x]Counseling given on all above above categories    HISTORY/ALLERGIES/ROS     MedHx:  has a past medical history of Albinoidism ([facility] [address] CAD (coronary artery disease); COPD (chronic obstructive pulmonary disease) ([facility] [address] Depression; Diabetes mellitus ([facility] [address] Hearing impaired; Hiatal hernia; Hyperlipidemia; Hypertension; Kidney stone; Malignant melanoma ([facility] [address] Osteoarthritis; Pneumonia; and Stented coronary artery. SurgHx:  has a past surgical history that includes Coronary angioplasty with stent; Hysterectomy (1987); Skin cancer excision (<2000); knee surgery; Stapedes surgery ([id number]); skin biopsy;  Colonoscopy; Endoscopy, colon, diagnostic; and Coronary artery bypass graft (03/17/15). SocHx:   reports that she has been smoking Cigarettes. She has been smoking about 1.00 pack per day. She has never used smokeless tobacco. She reports that she does not drink alcohol or use drugs. FamHx: family history includes Cancer in an other family member; Esophageal Cancer in her son; Seizures in her daughter. Allergies: Pcn [penicillins]; Tetanus toxoids; and Statins   ROS:  [x]Full ROS obtained and negative except as mentioned in HPI     MEDICATIONS      Current Outpatient Prescriptions   Medication Sig Dispense Refill    omeprazole (PRILOSEC) 20 MG delayed release capsule TAKE 1 CAPSULE DAILY 90 capsule 2    mometasone-formoterol (DULERA) 200-5 MCG/ACT inhaler Inhale 2 puffs into the lungs every 12 hours      Multiple Vitamins-Minerals (THERAPEUTIC MULTIVITAMIN-MINERALS) tablet Take 1 tablet by mouth daily      DULoxetine (CYMBALTA) 60 MG extended release capsule Take 1 capsule by mouth daily For arthritis pain and mood 90 capsule 3    LIVALO 4 MG TABS tablet TAKE 1 TABLET NIGHTLY 90 tablet 1    benzonatate (TESSALON PERLES) 100 MG capsule Take 1 capsule by mouth 3 times daily as needed for Cough 270 capsule 0    clonazePAM (KLONOPIN) 0.5 MG tablet Take 1 tablet at night and 1/2 tablet during day as needed. . 135 tablet 0    valsartan-hydrochlorothiazide (DIOVAN-HCT) 320-25 MG per tablet TAKE 1 TABLET DAILY 90 tablet 1    fluticasone (FLONASE) 50 MCG/ACT nasal spray 1 spray by Nasal route daily 3 Bottle 3    traMADol-acetaminophen (ULTRACET) 37.5-325 MG per tablet 1 every 4 hours as needed and if no response, can take 2 at once every 8 hours.  120 tablet 0    albuterol sulfate HFA (PROAIR HFA) 108 (90 BASE) MCG/ACT inhaler Inhale 2 puffs into the lungs every 4 hours as needed for Wheezing (cough, shortness breath or wheezing.) 1 Inhaler 0    metoprolol tartrate (LOPRESSOR) 50 MG tablet TAKE 1 TABLET TWICE A DAY 180 tablet 3    vitamin B-12 (CYANOCOBALAMIN) 1000 MCG tablet Take 2 tablets by mouth daily 30 tablet     naproxen (NAPROSYN) 375 MG tablet Take 1 twice a day as needed for arthritis pain. Best to take with food. 60 tablet 2    aspirin 81 MG tablet Take 81 mg by mouth daily      nitroGLYCERIN (NITROSTAT) 0.4 MG SL tablet Place 1 tablet under the tongue every 5 minutes as needed for Chest pain 25 tablet 0    promethazine (PHENERGAN) 25 MG tablet Take 0.5 tablets by mouth every 6 hours as needed for Nausea 60 tablet 3    Omega-3 Krill Oil 300 MG CAPS Take 1 tablet by mouth daily        No current facility-administered medications for this visit.       Reviewed with patient and will remain unchanged except as mentioned in A/P  PHYSICAL EXAM     Vitals:    03/16/18 1340   BP: 136/66   Pulse: 60      Gen Alert, coop, no distress Heart  RRR, no MRG, nl apic impulse   Head NC, AT, no abnorm Abd  Soft, NT, +BS, no mass, no OM   Eyes PERRLA, conj/corn clear Ext  Ext nl, AT, no C/C/E   Nose Nares nl, no drain, NT Pulse 2+ and symmetric   Throat Lips, mucosa, tongue nl Skin Color/text/turg nl, no rash/lesions   Neck S/S, TM, NT, no bruit/JVD Psych Nl mood and affect   Lung CTA-B, unlabored, no DTP Lymph   No cervical or axillary LA   Ch wall NT, no deform Neuro  Nl gross M/S exam     ASSESSMENT AND PLAN     ~CAD   Date EF Results   Sx   No concerning   Hx 3/15  CABG   LHC 3/15  MVD->CABG   MPI   No recent   TTE 3/15 60%    Plan   Continue aggressive medical treatment at doses above  Stop smoking   ~HTN  Today BP [x] Controlled [] Borderline [] Uncontrolled   Counseling [x] Diet/Salt [x] Exercise [x] Weight    Plan Continue current medications at doses detailed above  ~Hyperchol  Goal LDL [] <100 [x] <70     Counseling [x] Diet [x] Weight [x] Exercise   Lipid/liver Monitor [x] PCP [] Cardio [] Endocrine   Plan Continue current doses of meds listed above  ~Tobacco  Status [x] Smoker [] Previous smoker Counseling [x] Risks [x] Cessation   Plan Continued avoidance of first and second hand smoke  ~Followup  []2 wk   []1m   []3m    [x]6m   []12m    []PRN  []Other:     If you have questions, please do not hesitate to call me. I look forward to following Mike Anderson along with you.     Sincerely,        Lei Kumari MD

## 2018-03-22 RX ORDER — METOPROLOL TARTRATE 50 MG/1
TABLET, FILM COATED ORAL
Qty: 180 TABLET | Refills: 3 | Status: SHIPPED | OUTPATIENT
Start: 2018-03-22 | End: 2019-03-19 | Stop reason: SDUPTHER

## 2018-03-30 ENCOUNTER — TELEPHONE (OUTPATIENT)
Dept: PHARMACY | Facility: CLINIC | Age: 78
End: 2018-03-30

## 2018-03-30 NOTE — TELEPHONE ENCOUNTER
CLINICAL PHARMACY NOTE - Medication Review    Ramy Alfonso is a 68 y.o. female referred to a clinical pharmacy specialist given their history of COPD and/or HF and number of home medications. Attempt made to reach patient by telephone for medication review. Left voice message for patient to return clinician's phone call to [phone]. Will continue to attempt to contact patient by telephone as appropriate. [facility].  [address] [facility]  Direct: [phone]  Department, toll free: [phone], option 7

## 2018-04-06 ENCOUNTER — OFFICE VISIT (OUTPATIENT)
Dept: INTERNAL MEDICINE CLINIC | Age: 78
End: 2018-04-06

## 2018-04-06 VITALS
SYSTOLIC BLOOD PRESSURE: 128 MMHG | BODY MASS INDEX: 42.6 KG/M2 | HEIGHT: 60 IN | HEART RATE: 64 BPM | WEIGHT: 217 LBS | DIASTOLIC BLOOD PRESSURE: 60 MMHG

## 2018-04-06 DIAGNOSIS — R73.02 IMPAIRED GLUCOSE TOLERANCE: ICD-10-CM

## 2018-04-06 DIAGNOSIS — E78.00 HYPERCHOLESTEREMIA: ICD-10-CM

## 2018-04-06 DIAGNOSIS — J44.9 CHRONIC OBSTRUCTIVE PULMONARY DISEASE, UNSPECIFIED COPD TYPE (HCC): Primary | ICD-10-CM

## 2018-04-06 DIAGNOSIS — R11.0 NAUSEA: ICD-10-CM

## 2018-04-06 DIAGNOSIS — F17.200 TOBACCO DEPENDENCE: ICD-10-CM

## 2018-04-06 DIAGNOSIS — E66.01 MORBID OBESITY (HCC): ICD-10-CM

## 2018-04-06 DIAGNOSIS — F33.40 RECURRENT MAJOR DEPRESSION IN REMISSION (HCC): ICD-10-CM

## 2018-04-06 DIAGNOSIS — E70.30 ALBINOIDISM (HCC): ICD-10-CM

## 2018-04-06 DIAGNOSIS — Z79.899 CHRONIC USE OF BENZODIAZEPINE FOR THERAPEUTIC PURPOSE: ICD-10-CM

## 2018-04-06 PROBLEM — Z79.891 USE OF OPIATES FOR THERAPEUTIC PURPOSES: Status: RESOLVED | Noted: 2017-09-03 | Resolved: 2018-04-06

## 2018-04-06 PROCEDURE — 4040F PNEUMOC VAC/ADMIN/RCVD: CPT | Performed by: FAMILY MEDICINE

## 2018-04-06 PROCEDURE — 1123F ACP DISCUSS/DSCN MKR DOCD: CPT | Performed by: FAMILY MEDICINE

## 2018-04-06 PROCEDURE — G8926 SPIRO NO PERF OR DOC: HCPCS | Performed by: FAMILY MEDICINE

## 2018-04-06 PROCEDURE — G8427 DOCREV CUR MEDS BY ELIG CLIN: HCPCS | Performed by: FAMILY MEDICINE

## 2018-04-06 PROCEDURE — 4004F PT TOBACCO SCREEN RCVD TLK: CPT | Performed by: FAMILY MEDICINE

## 2018-04-06 PROCEDURE — G8399 PT W/DXA RESULTS DOCUMENT: HCPCS | Performed by: FAMILY MEDICINE

## 2018-04-06 PROCEDURE — G8598 ASA/ANTIPLAT THER USED: HCPCS | Performed by: FAMILY MEDICINE

## 2018-04-06 PROCEDURE — G8417 CALC BMI ABV UP PARAM F/U: HCPCS | Performed by: FAMILY MEDICINE

## 2018-04-06 PROCEDURE — 99214 OFFICE O/P EST MOD 30 MIN: CPT | Performed by: FAMILY MEDICINE

## 2018-04-06 PROCEDURE — 1090F PRES/ABSN URINE INCON ASSESS: CPT | Performed by: FAMILY MEDICINE

## 2018-04-06 PROCEDURE — 3023F SPIROM DOC REV: CPT | Performed by: FAMILY MEDICINE

## 2018-04-06 RX ORDER — PROMETHAZINE HYDROCHLORIDE 25 MG/1
12.5 TABLET ORAL EVERY 6 HOURS PRN
Qty: 60 TABLET | Refills: 1 | Status: SHIPPED | OUTPATIENT
Start: 2018-04-06 | End: 2021-07-28

## 2018-04-09 ENCOUNTER — TELEPHONE (OUTPATIENT)
Dept: ORTHOPEDIC SURGERY | Age: 78
End: 2018-04-09

## 2018-04-09 RX ORDER — VALSARTAN AND HYDROCHLOROTHIAZIDE 320; 25 MG/1; MG/1
TABLET, FILM COATED ORAL
Qty: 90 TABLET | Refills: 1 | Status: SHIPPED | OUTPATIENT
Start: 2018-04-09 | End: 2018-10-06 | Stop reason: SDUPTHER

## 2018-05-29 ENCOUNTER — TELEPHONE (OUTPATIENT)
Dept: INTERNAL MEDICINE CLINIC | Age: 78
End: 2018-05-29

## 2018-05-29 ENCOUNTER — OFFICE VISIT (OUTPATIENT)
Dept: INTERNAL MEDICINE CLINIC | Age: 78
End: 2018-05-29

## 2018-05-29 VITALS — HEIGHT: 60 IN | DIASTOLIC BLOOD PRESSURE: 68 MMHG | SYSTOLIC BLOOD PRESSURE: 126 MMHG | HEART RATE: 68 BPM

## 2018-05-29 DIAGNOSIS — M54.50 ACUTE BILATERAL LOW BACK PAIN WITHOUT SCIATICA: ICD-10-CM

## 2018-05-29 DIAGNOSIS — M62.830 SPASM OF LUMBAR PARASPINOUS MUSCLE: ICD-10-CM

## 2018-05-29 DIAGNOSIS — M15.9 PRIMARY OSTEOARTHRITIS INVOLVING MULTIPLE JOINTS: ICD-10-CM

## 2018-05-29 LAB
BILIRUBIN, POC: NORMAL
BLOOD URINE, POC: NORMAL
CLARITY, POC: CLEAR
COLOR, POC: YELLOW
GLUCOSE URINE, POC: NORMAL
KETONES, POC: NORMAL
LEUKOCYTE EST, POC: NORMAL
NITRITE, POC: NORMAL
PH, POC: 5.5
PROTEIN, POC: NORMAL
SPECIFIC GRAVITY, POC: 1.02
UROBILINOGEN, POC: 0.2

## 2018-05-29 PROCEDURE — 4004F PT TOBACCO SCREEN RCVD TLK: CPT | Performed by: INTERNAL MEDICINE

## 2018-05-29 PROCEDURE — 1123F ACP DISCUSS/DSCN MKR DOCD: CPT | Performed by: INTERNAL MEDICINE

## 2018-05-29 PROCEDURE — 4040F PNEUMOC VAC/ADMIN/RCVD: CPT | Performed by: INTERNAL MEDICINE

## 2018-05-29 PROCEDURE — G8427 DOCREV CUR MEDS BY ELIG CLIN: HCPCS | Performed by: INTERNAL MEDICINE

## 2018-05-29 PROCEDURE — 81002 URINALYSIS NONAUTO W/O SCOPE: CPT | Performed by: INTERNAL MEDICINE

## 2018-05-29 PROCEDURE — G8399 PT W/DXA RESULTS DOCUMENT: HCPCS | Performed by: INTERNAL MEDICINE

## 2018-05-29 PROCEDURE — G8417 CALC BMI ABV UP PARAM F/U: HCPCS | Performed by: INTERNAL MEDICINE

## 2018-05-29 PROCEDURE — 1090F PRES/ABSN URINE INCON ASSESS: CPT | Performed by: INTERNAL MEDICINE

## 2018-05-29 PROCEDURE — 99213 OFFICE O/P EST LOW 20 MIN: CPT | Performed by: INTERNAL MEDICINE

## 2018-05-29 PROCEDURE — G8598 ASA/ANTIPLAT THER USED: HCPCS | Performed by: INTERNAL MEDICINE

## 2018-05-29 RX ORDER — TIZANIDINE HYDROCHLORIDE 2 MG/1
2 CAPSULE, GELATIN COATED ORAL 3 TIMES DAILY PRN
Qty: 15 CAPSULE | Refills: 0 | Status: SHIPPED | OUTPATIENT
Start: 2018-05-29 | End: 2019-03-07 | Stop reason: SDUPTHER

## 2018-05-29 ASSESSMENT — ENCOUNTER SYMPTOMS: BACK PAIN: 1

## 2018-06-07 DIAGNOSIS — I25.10 CORONARY ARTERY DISEASE INVOLVING NATIVE CORONARY ARTERY OF NATIVE HEART WITHOUT ANGINA PECTORIS: ICD-10-CM

## 2018-06-07 RX ORDER — PITAVASTATIN CALCIUM 4.18 MG/1
TABLET, FILM COATED ORAL
Qty: 90 TABLET | Refills: 1 | Status: SHIPPED | OUTPATIENT
Start: 2018-06-07 | End: 2018-12-04 | Stop reason: SDUPTHER

## 2018-06-22 DIAGNOSIS — J44.9 CHRONIC OBSTRUCTIVE PULMONARY DISEASE, UNSPECIFIED COPD TYPE (HCC): ICD-10-CM

## 2018-06-22 DIAGNOSIS — E78.00 HYPERCHOLESTEREMIA: ICD-10-CM

## 2018-06-22 DIAGNOSIS — R73.02 IMPAIRED GLUCOSE TOLERANCE: ICD-10-CM

## 2018-06-22 LAB
A/G RATIO: 1.8 (ref 1.1–2.2)
ALBUMIN SERPL-MCNC: 4.4 G/DL (ref 3.4–5)
ALP BLD-CCNC: 119 U/L (ref 40–129)
ALT SERPL-CCNC: 14 U/L (ref 10–40)
ANION GAP SERPL CALCULATED.3IONS-SCNC: 17 MMOL/L (ref 3–16)
AST SERPL-CCNC: 14 U/L (ref 15–37)
BASOPHILS ABSOLUTE: 0.1 K/UL (ref 0–0.2)
BASOPHILS RELATIVE PERCENT: 0.7 %
BILIRUB SERPL-MCNC: 0.4 MG/DL (ref 0–1)
BUN BLDV-MCNC: 25 MG/DL (ref 7–20)
CALCIUM SERPL-MCNC: 10 MG/DL (ref 8.3–10.6)
CHLORIDE BLD-SCNC: 99 MMOL/L (ref 99–110)
CHOLESTEROL, TOTAL: 171 MG/DL (ref 0–199)
CO2: 27 MMOL/L (ref 21–32)
CREAT SERPL-MCNC: 1.2 MG/DL (ref 0.6–1.2)
EOSINOPHILS ABSOLUTE: 0.3 K/UL (ref 0–0.6)
EOSINOPHILS RELATIVE PERCENT: 2.6 %
GFR AFRICAN AMERICAN: 53
GFR NON-AFRICAN AMERICAN: 43
GLOBULIN: 2.5 G/DL
GLUCOSE BLD-MCNC: 115 MG/DL (ref 70–99)
HCT VFR BLD CALC: 42 % (ref 36–48)
HDLC SERPL-MCNC: 42 MG/DL (ref 40–60)
HEMOGLOBIN: 14.2 G/DL (ref 12–16)
LDL CHOLESTEROL CALCULATED: 91 MG/DL
LYMPHOCYTES ABSOLUTE: 4.1 K/UL (ref 1–5.1)
LYMPHOCYTES RELATIVE PERCENT: 32 %
MCH RBC QN AUTO: 29.4 PG (ref 26–34)
MCHC RBC AUTO-ENTMCNC: 33.7 G/DL (ref 31–36)
MCV RBC AUTO: 87.2 FL (ref 80–100)
MONOCYTES ABSOLUTE: 1 K/UL (ref 0–1.3)
MONOCYTES RELATIVE PERCENT: 7.9 %
NEUTROPHILS ABSOLUTE: 7.2 K/UL (ref 1.7–7.7)
NEUTROPHILS RELATIVE PERCENT: 56.8 %
PDW BLD-RTO: 15.2 % (ref 12.4–15.4)
PLATELET # BLD: 368 K/UL (ref 135–450)
PMV BLD AUTO: 8.5 FL (ref 5–10.5)
POTASSIUM SERPL-SCNC: 4.1 MMOL/L (ref 3.5–5.1)
RBC # BLD: 4.81 M/UL (ref 4–5.2)
SODIUM BLD-SCNC: 143 MMOL/L (ref 136–145)
TOTAL PROTEIN: 6.9 G/DL (ref 6.4–8.2)
TRIGL SERPL-MCNC: 188 MG/DL (ref 0–150)
TSH REFLEX: 3.66 UIU/ML (ref 0.27–4.2)
VLDLC SERPL CALC-MCNC: 38 MG/DL
WBC # BLD: 12.8 K/UL (ref 4–11)

## 2018-06-23 LAB
ESTIMATED AVERAGE GLUCOSE: 139.9 MG/DL
HBA1C MFR BLD: 6.5 %

## 2018-06-29 ENCOUNTER — OFFICE VISIT (OUTPATIENT)
Dept: INTERNAL MEDICINE CLINIC | Age: 78
End: 2018-06-29

## 2018-06-29 VITALS
BODY MASS INDEX: 43 KG/M2 | DIASTOLIC BLOOD PRESSURE: 70 MMHG | WEIGHT: 219 LBS | HEART RATE: 72 BPM | SYSTOLIC BLOOD PRESSURE: 122 MMHG | HEIGHT: 60 IN

## 2018-06-29 DIAGNOSIS — E78.00 HYPERCHOLESTEREMIA: Primary | ICD-10-CM

## 2018-06-29 DIAGNOSIS — K21.9 GASTROESOPHAGEAL REFLUX DISEASE, ESOPHAGITIS PRESENCE NOT SPECIFIED: ICD-10-CM

## 2018-06-29 DIAGNOSIS — R73.09 IMPAIRED GLUCOSE METABOLISM: ICD-10-CM

## 2018-06-29 DIAGNOSIS — I10 ESSENTIAL HYPERTENSION: ICD-10-CM

## 2018-06-29 DIAGNOSIS — M67.432 GANGLION CYST OF VOLAR ASPECT OF LEFT WRIST: ICD-10-CM

## 2018-06-29 DIAGNOSIS — R42 VERTIGO: ICD-10-CM

## 2018-06-29 DIAGNOSIS — Z79.899 CHRONIC USE OF BENZODIAZEPINE FOR THERAPEUTIC PURPOSE: ICD-10-CM

## 2018-06-29 PROCEDURE — G8417 CALC BMI ABV UP PARAM F/U: HCPCS | Performed by: FAMILY MEDICINE

## 2018-06-29 PROCEDURE — 4004F PT TOBACCO SCREEN RCVD TLK: CPT | Performed by: FAMILY MEDICINE

## 2018-06-29 PROCEDURE — G8427 DOCREV CUR MEDS BY ELIG CLIN: HCPCS | Performed by: FAMILY MEDICINE

## 2018-06-29 PROCEDURE — G8598 ASA/ANTIPLAT THER USED: HCPCS | Performed by: FAMILY MEDICINE

## 2018-06-29 PROCEDURE — 99214 OFFICE O/P EST MOD 30 MIN: CPT | Performed by: FAMILY MEDICINE

## 2018-06-29 PROCEDURE — 1090F PRES/ABSN URINE INCON ASSESS: CPT | Performed by: FAMILY MEDICINE

## 2018-06-29 PROCEDURE — G8399 PT W/DXA RESULTS DOCUMENT: HCPCS | Performed by: FAMILY MEDICINE

## 2018-06-29 PROCEDURE — 1123F ACP DISCUSS/DSCN MKR DOCD: CPT | Performed by: FAMILY MEDICINE

## 2018-06-29 PROCEDURE — 4040F PNEUMOC VAC/ADMIN/RCVD: CPT | Performed by: FAMILY MEDICINE

## 2018-06-29 RX ORDER — CLONAZEPAM 0.5 MG/1
0.5 TABLET ORAL DAILY PRN
Qty: 90 TABLET | Refills: 0 | Status: SHIPPED | OUTPATIENT
Start: 2018-06-29 | End: 2018-09-21 | Stop reason: SDUPTHER

## 2018-06-29 NOTE — PROGRESS NOTES
Assessment:      1. Hypercholesteremia  Lab Results   Component Value Date    LDLCALC 91 06/22/2018    LDLDIRECT 103 (H) 11/27/2017     Not quite at goal with statin but only dose and only statin she can tolerate. Smoking cessation advised. Healthy diet. She is sedentary and at her age, not inclined to exercise. 2. Chronic use of benzodiazepine for therapeutic purpose  Controlled Substances Monitoring:     RX Monitoring 6/29/2018   Attestation The Prescription Monitoring Report for this patient was reviewed today. Documentation No signs of potential drug abuse or diversion identified. Medication Contracts Existing medication contract. 3. Vertigo  Long term use. Has developed some tolerance to sedative effects. - clonazePAM (KLONOPIN) 0.5 MG tablet; Take 1 tablet by mouth daily as needed (vertigo) for up to 90 days. .  Dispense: 90 tablet; Refill: 0    4. Impaired glucose metabolism  Lab Results   Component Value Date    LABA1C 6.5 06/22/2018     Watch diet, lose weight. 5. Gastroesophageal reflux disease, esophagitis presence not specified  Under control with daily PPI    6. Essential hypertension  . BP: 122/70   At goal and meeting medical guidelines. Continue treatment. 7. Ganglion cyst of volar aspect of left wrist  Reassured. Plan:      See assessment and/or orders. See after visit summary, patient instructions, and reference hand-outs. Discussed use, benefit, and side effects of prescribed medications. Barriers to medication compliance addressed. All patient questions answered.

## 2018-06-29 NOTE — PATIENT INSTRUCTIONS
symptoms. · You feel the benefits of medicine outweigh the side effects. Why might you choose not to use medicine? · You want to try quitting on your own by stopping all at once (\"cold turkey\"). · You want to cut back slowly on the number of cigarettes you smoke. · You smoke fewer than 5 cigarettes a day. · You do not like using medicine. · You feel the side effects of medicines outweigh the benefits. · You are worried about the cost of medicines. Your decision  Thinking about the facts and your feelings can help you make a decision that is right for you. Be sure you understand the benefits and risks of your options, and think about what else you need to do before you make the decision. Where can you learn more? Go to https://[employer]. org and sign in to your [employer] account. Enter T639 in the [employer] box to learn more about \"Deciding About Using Medicines To Quit Smoking. \"     If you do not have an account, please click on the \"Sign Up Now\" link. Current as of: November 29, 2017  Content Version: 11.6  © 6166-9001 [employer]. Care instructions adapted under license by [facility] ([facility]). If you have questions about a medical condition or this instruction, always ask your healthcare professional. [address] any warranty or liability for your use of this information. Patient Education        Learning About Benefits From Quitting Smoking  How does quitting smoking make you healthier? If you're thinking about quitting smoking, you may have a few reasons to be smoke-free. Your health may be one of them. · When you quit smoking, you lower your risks for cancer, lung disease, heart attack, stroke, blood vessel disease, and blindness from macular degeneration. · When you're smoke-free, you get sick less often, and you heal faster. You are less likely to get colds, flu, bronchitis, and pneumonia.   · As a nonsmoker, you may find that

## 2018-07-01 PROBLEM — M67.432 GANGLION CYST OF VOLAR ASPECT OF LEFT WRIST: Status: ACTIVE | Noted: 2018-07-01

## 2018-07-01 ASSESSMENT — ENCOUNTER SYMPTOMS
WHEEZING: 0
CHEST TIGHTNESS: 0
SHORTNESS OF BREATH: 0
COUGH: 0

## 2018-07-03 ENCOUNTER — TELEPHONE (OUTPATIENT)
Dept: RHEUMATOLOGY | Age: 78
End: 2018-07-03

## 2018-08-17 ENCOUNTER — OFFICE VISIT (OUTPATIENT)
Dept: INTERNAL MEDICINE CLINIC | Age: 78
End: 2018-08-17

## 2018-08-17 VITALS
HEART RATE: 64 BPM | SYSTOLIC BLOOD PRESSURE: 124 MMHG | DIASTOLIC BLOOD PRESSURE: 70 MMHG | BODY MASS INDEX: 44.17 KG/M2 | HEIGHT: 60 IN | WEIGHT: 225 LBS

## 2018-08-17 DIAGNOSIS — M17.12 ARTHRITIS OF KNEE, LEFT: ICD-10-CM

## 2018-08-17 DIAGNOSIS — M72.2 PLANTAR FASCIITIS OF LEFT FOOT: Primary | ICD-10-CM

## 2018-08-17 DIAGNOSIS — M70.62 TROCHANTERIC BURSITIS OF LEFT HIP: ICD-10-CM

## 2018-08-17 PROCEDURE — G8598 ASA/ANTIPLAT THER USED: HCPCS | Performed by: FAMILY MEDICINE

## 2018-08-17 PROCEDURE — 1123F ACP DISCUSS/DSCN MKR DOCD: CPT | Performed by: FAMILY MEDICINE

## 2018-08-17 PROCEDURE — 4004F PT TOBACCO SCREEN RCVD TLK: CPT | Performed by: FAMILY MEDICINE

## 2018-08-17 PROCEDURE — G8399 PT W/DXA RESULTS DOCUMENT: HCPCS | Performed by: FAMILY MEDICINE

## 2018-08-17 PROCEDURE — G8417 CALC BMI ABV UP PARAM F/U: HCPCS | Performed by: FAMILY MEDICINE

## 2018-08-17 PROCEDURE — 1101F PT FALLS ASSESS-DOCD LE1/YR: CPT | Performed by: FAMILY MEDICINE

## 2018-08-17 PROCEDURE — 4040F PNEUMOC VAC/ADMIN/RCVD: CPT | Performed by: FAMILY MEDICINE

## 2018-08-17 PROCEDURE — 99213 OFFICE O/P EST LOW 20 MIN: CPT | Performed by: FAMILY MEDICINE

## 2018-08-17 PROCEDURE — 1090F PRES/ABSN URINE INCON ASSESS: CPT | Performed by: FAMILY MEDICINE

## 2018-08-17 PROCEDURE — G8427 DOCREV CUR MEDS BY ELIG CLIN: HCPCS | Performed by: FAMILY MEDICINE

## 2018-08-17 RX ORDER — PREDNISONE 10 MG/1
TABLET ORAL
Qty: 30 TABLET | Refills: 0 | Status: SHIPPED | OUTPATIENT
Start: 2018-08-17 | End: 2018-08-27

## 2018-08-17 NOTE — PATIENT INSTRUCTIONS
Use the prednisone for all the pills. Let us know if you cannot tolerate it. If it is some better but not gone at the end, let us know for a refill. If no improvement, will need further testing and possibly physical therapy    Ice is helpfu for the hip pain and heal pain. Heat or ice for the knee --- your choice. Patient Education        Hip Bursitis: Care Instructions  Your Care Instructions    Bursitis is inflammation of the bursa. A bursa is a small sac of fluid that cushions a joint and helps it move easily. A bursa sits between a bone in the hip and the muscles and tendons in the thigh and buttock. Injury or overuse of the hip can cause bursitis. Activities that can lead to bursitis include twisting and rapid joint movement. Bursitis can cause hip pain. Bursitis usually gets better if you avoid the activity that caused it. If pain lasts or gets worse despite home treatment, your doctor may draw fluid from the bursa through a needle. This may relieve your pain and help your doctor know if you have an infection. If so, your doctor will prescribe antibiotics. If you have inflammation only, you may get a corticosteroid shot to reduce swelling and pain. Sometimes surgery is needed to drain or remove the bursa. Follow-up care is a key part of your treatment and safety. Be sure to make and go to all appointments, and call your doctor if you are having problems. It's also a good idea to know your test results and keep a list of the medicines you take. How can you care for yourself at home? · Put ice or a cold pack on your hip for 10 to 20 minutes at a time. Put a thin cloth between the ice and your skin. · After 3 days of using ice, you may use heat on your hip. You can use a hot water bottle, a heating pad set on low, or a warm, moist towel. · Rest your hip. Stop any activities that cause pain. Switch to activities that do not stress your hip. · Take your medicines exactly as prescribed.  Call your doctor if you think you are having a problem with your medicine. · Ask your doctor if you can take an over-the-counter pain medicine, such as acetaminophen (Tylenol), ibuprofen (Advil, Motrin), or naproxen (Aleve). Be safe with medicines. Read and follow all instructions on the label. · To prevent stiffness, gently move the hip joint as much as you can without pain every day. As the pain gets better, keep doing range-of-motion exercises. Ask your doctor for exercises that will make the muscles around the hip joint stronger. Do these as directed. · You can slowly return to the activity that caused the pain, but do it with less effort until you can do it without pain or swelling. Be sure to warm up before and stretch after you do the activity. Patient Education      Plantar Fasciitis: Care Instructions  Your Care Instructions    Plantar fasciitis is pain and inflammation of the plantar fascia, the tissue at the bottom of your foot that connects the heel bone to the toes. The plantar fascia also supports the arch. If you strain the plantar fascia, it can develop small tears and cause heel pain when you stand or walk. Plantar fasciitis can be caused by running or other sports. It also may occur in people who are overweight or who have high arches or flat feet. You may get plantar fasciitis if you walk or stand for long periods, or have a tight Achilles tendon or calf muscles. You can improve your foot pain with rest and other care at home. It might take a few weeks to a few months for your foot to heal completely. Follow-up care is a key part of your treatment and safety. Be sure to make and go to all appointments, and call your doctor if you are having problems. It's also a good idea to know your test results and keep a list of the medicines you take. How can you care for yourself at home? · Rest your feet often. Reduce your activity to a level that lets you avoid pain.  If possible, do not run or walk on hard on the \"Sign Up Now\" link. Current as of: November 29, 2017  Content Version: 11.7  © 3898-9492 [employer]. Care instructions adapted under license by [facility] ([facility]). If you have questions about a medical condition or this instruction, always ask your healthcare professional. [place] [address] any warranty or liability for your use of this information.

## 2018-08-17 NOTE — PROGRESS NOTES
fluticasone (FLONASE) 50 MCG/ACT nasal spray 1 spray by Nasal route daily 3 Bottle 3    albuterol sulfate HFA (PROAIR HFA) 108 (90 BASE) MCG/ACT inhaler Inhale 2 puffs into the lungs every 4 hours as needed for Wheezing (cough, shortness breath or wheezing.) 1 Inhaler 0    vitamin B-12 (CYANOCOBALAMIN) 1000 MCG tablet Take 2 tablets by mouth daily 30 tablet     aspirin 81 MG tablet Take 81 mg by mouth daily      Omega-3 Krill Oil 300 MG CAPS Take 1 tablet by mouth daily          Patient Active Problem List   Diagnosis    Impaired glucose metabolism    Hypercholesteremia    Albinoidism (HCC)    Coronary artery disease involving native coronary artery of native heart without angina pectoris    Hearing impaired    Vertigo    GERD (gastroesophageal reflux disease)    Intertrigo    Abnormal EKG    Essential hypertension    History of melanoma    Iron deficiency anemia    Tobacco dependence    Chest wall pain following surgery    Sacroiliac pain    Chronic rhinitis    B12 deficiency    Blepharitis of lower eyelids of both eyes    Chronic obstructive pulmonary disease (HCC)    Morbid obesity (HCC)    Recurrent major depression in remission ([facility])    Chronic use of benzodiazepine for therapeutic purpose    Acute bilateral low back pain without sciatica    Spasm of lumbar paraspinous muscle    Ganglion cyst of volar aspect of left wrist         Review of Systems   Constitutional: Negative for fever and unexpected weight change. Skin: Negative for rash. Neurological: Negative for weakness (specific or localized except mild at both knees). Objective:   Physical Exam   Constitutional: She appears well-developed and well-nourished. Cardiovascular: Normal rate, regular rhythm, normal heart sounds and intact distal pulses. Pulses:       Dorsalis pedis pulses are 1+ on the right side, and 1+ on the left side. Posterior tibial pulses are 1+ on the right side, and 1+ on the left side. Pulmonary/Chest: Effort normal and breath sounds normal.   Musculoskeletal:        Left hip: She exhibits tenderness and bony tenderness. She exhibits no swelling, no crepitus and no deformity. Left knee: She exhibits swelling. She exhibits no effusion. Tenderness found. Medial joint line and lateral joint line tenderness noted. Left foot: There is tenderness and bony tenderness. There is no swelling and no deformity. Tender over trochanteric bursa. Not tender over the anterior hip. Painful to ab/adduct the hip. Left knee is mildly warmer than right knee. No Baker's cyst.  No calf swelling, tenderness or Divina  Tender left heel on or near calcaneus     Skin: Skin is warm and dry. No rash noted. No erythema. No pallor. Psychiatric: She has a normal mood and affect. Her behavior is normal. Cognition and memory are normal.   Vitals reviewed. Assessment:      1. Plantar fasciitis of left foot  Hand out given. Ice heel at least once daily with frozen water bottle. Shoes and padding. Stretches. - predniSONE (DELTASONE) 10 MG tablet; 4 tabs for 2 days, 3 tabs for 2 days, 2 tabs for 2 days, 1 tab for 1 week, 1/2 tab until pills gone. Take at breakfast and/or lunch  Dispense: 30 tablet; Refill: 0    2. Arthritis of knee, left  May need imaging and referral.  HEP dwp and given   - predniSONE (DELTASONE) 10 MG tablet; 4 tabs for 2 days, 3 tabs for 2 days, 2 tabs for 2 days, 1 tab for 1 week, 1/2 tab until pills gone. Take at breakfast and/or lunch  Dispense: 30 tablet; Refill: 0    3. Trochanteric bursitis of left hip  Ice and HEP. Prednisone for 2+ weeks. - predniSONE (DELTASONE) 10 MG tablet; 4 tabs for 2 days, 3 tabs for 2 days, 2 tabs for 2 days, 1 tab for 1 week, 1/2 tab until pills gone. Take at breakfast and/or lunch  Dispense: 30 tablet; Refill: 0          Plan:      See orders. See after visit summary, patient instructions, and reference hand-outs.     Discussed use, benefit, and

## 2018-09-17 NOTE — PROGRESS NOTES
[address]       H+P // CONSULT // OUTPATIENT VISIT // Alisson James     Referring Doctor Barbara Britton MD   Encounter Type Followup     CHIEF COMPLAINT     VisitType [] Acute [x] Chronic     Symptom [x] None [] CP [] SOB [] Dizzy [] Palps [] Fatigue     Problems CAD s/p CABG 3/15, HTN, CHOL, TOB      HISTORY OF PRESENT ILLNESS     Doing well. Denies cp, sob. Compliant with meds. Continues to smoke. Not able to walk with planta fasciitis. Symptom Y N Frequency Duration Severity Modifying Assoc Sx Other   CP [] [x]         SOB [] [x]         Dizzy [] [x]         Syncope [] [x]         Palpitations [] [x]           COMPLIANCE     Category Meds Diet Salt Exercise Tobacco Alcohol Drugs   Compliant [x] [] [] [] [] [x] [x]   [x]Counseling given on all above above categories    HISTORY/ALLERGIES/ROS     MedHx:  has a past medical history of Albinoidism ([facility] [address] CAD (coronary artery disease); COPD (chronic obstructive pulmonary disease) ([facility] [address] Depression; Diabetes mellitus ([facility] [address] Hearing impaired; Hiatal hernia; Hyperlipidemia; Hypertension; Kidney stone; Malignant melanoma ([facility] [address] Osteoarthritis; Pneumonia; and Stented coronary artery. SurgHx:  has a past surgical history that includes Coronary angioplasty with stent; Hysterectomy (1987); Skin cancer excision (<2000); knee surgery; Stapedes surgery ([id number]); skin biopsy; Colonoscopy; Endoscopy, colon, diagnostic; and Coronary artery bypass graft (03/17/15). SocHx:   reports that she has been smoking Cigarettes. She has been smoking about 1.00 pack per day. She has never used smokeless tobacco. She reports that she does not drink alcohol or use drugs. FamHx: family history includes Cancer in an other family member; Esophageal Cancer in her son; Seizures in her daughter. Allergies: Pcn [penicillins];  Tetanus toxoids; and Statins   ROS:  [x]Full ROS obtained and negative except as mentioned in HPI     MEDICATIONS      Current Outpatient Prescriptions   Medication Sig Dispense Refill    clonazePAM (KLONOPIN) 0.5 MG tablet Take 1 tablet by mouth daily as needed (vertigo) for up to 90 days. . 90 tablet 0    LIVALO 4 MG TABS tablet TAKE 1 TABLET NIGHTLY 90 tablet 1    tiZANidine (ZANAFLEX) 2 MG capsule Take 1 capsule by mouth 3 times daily as needed for Muscle spasms 15 capsule 0    valsartan-hydrochlorothiazide (DIOVAN-HCT) 320-25 MG per tablet TAKE 1 TABLET DAILY 90 tablet 1    promethazine (PHENERGAN) 25 MG tablet Take 0.5 tablets by mouth every 6 hours as needed for Nausea 60 tablet 1    metoprolol tartrate (LOPRESSOR) 50 MG tablet TAKE 1 TABLET TWICE A  tablet 3    omeprazole (PRILOSEC) 20 MG delayed release capsule TAKE 1 CAPSULE DAILY 90 capsule 2    Multiple Vitamins-Minerals (THERAPEUTIC MULTIVITAMIN-MINERALS) tablet Take 1 tablet by mouth daily      DULoxetine (CYMBALTA) 60 MG extended release capsule Take 1 capsule by mouth daily For arthritis pain and mood 90 capsule 3    benzonatate (TESSALON PERLES) 100 MG capsule Take 1 capsule by mouth 3 times daily as needed for Cough 270 capsule 0    fluticasone (FLONASE) 50 MCG/ACT nasal spray 1 spray by Nasal route daily 3 Bottle 3    albuterol sulfate HFA (PROAIR HFA) 108 (90 BASE) MCG/ACT inhaler Inhale 2 puffs into the lungs every 4 hours as needed for Wheezing (cough, shortness breath or wheezing.) 1 Inhaler 0    vitamin B-12 (CYANOCOBALAMIN) 1000 MCG tablet Take 2 tablets by mouth daily 30 tablet     aspirin 81 MG tablet Take 81 mg by mouth daily      nitroGLYCERIN (NITROSTAT) 0.4 MG SL tablet Place 1 tablet under the tongue every 5 minutes as needed for Chest pain 25 tablet 0    Omega-3 Krill Oil 300 MG CAPS Take 1 tablet by mouth daily        No current facility-administered medications for this visit.       Reviewed with patient and will remain unchanged except as mentioned in A/P  PHYSICAL EXAM     Vitals:    09/19/18 1431   BP: (!) 120/58   Pulse: 70   SpO2: 97%

## 2018-09-19 ENCOUNTER — OFFICE VISIT (OUTPATIENT)
Dept: CARDIOLOGY CLINIC | Age: 78
End: 2018-09-19

## 2018-09-19 VITALS — DIASTOLIC BLOOD PRESSURE: 58 MMHG | OXYGEN SATURATION: 97 % | SYSTOLIC BLOOD PRESSURE: 120 MMHG | HEART RATE: 70 BPM

## 2018-09-19 DIAGNOSIS — I25.10 CORONARY ARTERY DISEASE INVOLVING NATIVE CORONARY ARTERY OF NATIVE HEART WITHOUT ANGINA PECTORIS: Primary | ICD-10-CM

## 2018-09-19 DIAGNOSIS — F17.200 TOBACCO DEPENDENCE: ICD-10-CM

## 2018-09-19 DIAGNOSIS — E78.00 HYPERCHOLESTEREMIA: ICD-10-CM

## 2018-09-19 DIAGNOSIS — I10 ESSENTIAL HYPERTENSION: ICD-10-CM

## 2018-09-19 PROCEDURE — G8427 DOCREV CUR MEDS BY ELIG CLIN: HCPCS | Performed by: INTERNAL MEDICINE

## 2018-09-19 PROCEDURE — 1101F PT FALLS ASSESS-DOCD LE1/YR: CPT | Performed by: INTERNAL MEDICINE

## 2018-09-19 PROCEDURE — 1090F PRES/ABSN URINE INCON ASSESS: CPT | Performed by: INTERNAL MEDICINE

## 2018-09-19 PROCEDURE — G8417 CALC BMI ABV UP PARAM F/U: HCPCS | Performed by: INTERNAL MEDICINE

## 2018-09-19 PROCEDURE — G8598 ASA/ANTIPLAT THER USED: HCPCS | Performed by: INTERNAL MEDICINE

## 2018-09-19 PROCEDURE — 4040F PNEUMOC VAC/ADMIN/RCVD: CPT | Performed by: INTERNAL MEDICINE

## 2018-09-19 PROCEDURE — 99214 OFFICE O/P EST MOD 30 MIN: CPT | Performed by: INTERNAL MEDICINE

## 2018-09-19 PROCEDURE — G8399 PT W/DXA RESULTS DOCUMENT: HCPCS | Performed by: INTERNAL MEDICINE

## 2018-09-19 PROCEDURE — 1123F ACP DISCUSS/DSCN MKR DOCD: CPT | Performed by: INTERNAL MEDICINE

## 2018-09-19 PROCEDURE — 4004F PT TOBACCO SCREEN RCVD TLK: CPT | Performed by: INTERNAL MEDICINE

## 2018-09-19 NOTE — LETTER
[facility]  [address] [address] [address] [address]  Phone: [phone]  Fax: [phone]    Radha Ma MD        September 19, 2018     Sarah Galvez, [address] [facility],[address]    Patient: Cleve Gutierrez  MR Number: [id number]  YOB: 1940  Date of Visit: 9/19/2018    Dear Dr. Sarah Galvez: Thank you for the request for consultation for Alexandria Turner to me for the evaluation of CAD. Below are the relevant portions of my assessment and plan of care. [address]       H+P // CONSULT // OUTPATIENT VISIT // Dois Jason     Referring Doctor Sarah Galvez MD   Encounter Type Followup     CHIEF COMPLAINT     VisitType [] Acute [x] Chronic     Symptom [x] None [] CP [] SOB [] Dizzy [] Palps [] Fatigue     Problems CAD s/p CABG 3/15, HTN, CHOL, TOB      HISTORY OF PRESENT ILLNESS     Doing well. Denies cp, sob. Compliant with meds. Continues to smoke. Not able to walk with planta fasciitis. Symptom Y N Frequency Duration Severity Modifying Assoc Sx Other   CP [] [x]         SOB [] [x]         Dizzy [] [x]         Syncope [] [x]         Palpitations [] [x]           COMPLIANCE     Category Meds Diet Salt Exercise Tobacco Alcohol Drugs   Compliant [x] [] [] [] [] [x] [x]   [x]Counseling given on all above above categories    HISTORY/ALLERGIES/ROS     MedHx:  has a past medical history of Albinoidism ([facility] [address] CAD (coronary artery disease); COPD (chronic obstructive pulmonary disease) ([facility] [address] Depression; Diabetes mellitus ([facility] [address] Hearing impaired; Hiatal hernia; Hyperlipidemia; Hypertension; Kidney stone; Malignant melanoma ([facility] [address] Osteoarthritis; Pneumonia; and Stented coronary artery. SurgHx:  has a past surgical history that includes Coronary angioplasty with stent; Hysterectomy (1987); Skin cancer excision (<2000); knee surgery; Stapedes surgery ([id number]); skin biopsy;  Colonoscopy; Endoscopy, colon, diagnostic; and Coronary artery bypass graft (03/17/15). SocHx:   reports that she has been smoking Cigarettes. She has been smoking about 1.00 pack per day. She has never used smokeless tobacco. She reports that she does not drink alcohol or use drugs. FamHx: family history includes Cancer in an other family member; Esophageal Cancer in her son; Seizures in her daughter. Allergies: Pcn [penicillins]; Tetanus toxoids; and Statins   ROS:  [x]Full ROS obtained and negative except as mentioned in HPI     MEDICATIONS      Current Outpatient Prescriptions   Medication Sig Dispense Refill    clonazePAM (KLONOPIN) 0.5 MG tablet Take 1 tablet by mouth daily as needed (vertigo) for up to 90 days. . 90 tablet 0    LIVALO 4 MG TABS tablet TAKE 1 TABLET NIGHTLY 90 tablet 1    tiZANidine (ZANAFLEX) 2 MG capsule Take 1 capsule by mouth 3 times daily as needed for Muscle spasms 15 capsule 0    valsartan-hydrochlorothiazide (DIOVAN-HCT) 320-25 MG per tablet TAKE 1 TABLET DAILY 90 tablet 1    promethazine (PHENERGAN) 25 MG tablet Take 0.5 tablets by mouth every 6 hours as needed for Nausea 60 tablet 1    metoprolol tartrate (LOPRESSOR) 50 MG tablet TAKE 1 TABLET TWICE A  tablet 3    omeprazole (PRILOSEC) 20 MG delayed release capsule TAKE 1 CAPSULE DAILY 90 capsule 2    Multiple Vitamins-Minerals (THERAPEUTIC MULTIVITAMIN-MINERALS) tablet Take 1 tablet by mouth daily      DULoxetine (CYMBALTA) 60 MG extended release capsule Take 1 capsule by mouth daily For arthritis pain and mood 90 capsule 3    benzonatate (TESSALON PERLES) 100 MG capsule Take 1 capsule by mouth 3 times daily as needed for Cough 270 capsule 0    fluticasone (FLONASE) 50 MCG/ACT nasal spray 1 spray by Nasal route daily 3 Bottle 3    albuterol sulfate HFA (PROAIR HFA) 108 (90 BASE) MCG/ACT inhaler Inhale 2 puffs into the lungs every 4 hours as needed for Wheezing (cough, shortness breath or wheezing.) 1 Inhaler 0  vitamin B-12 (CYANOCOBALAMIN) 1000 MCG tablet Take 2 tablets by mouth daily 30 tablet     aspirin 81 MG tablet Take 81 mg by mouth daily      nitroGLYCERIN (NITROSTAT) 0.4 MG SL tablet Place 1 tablet under the tongue every 5 minutes as needed for Chest pain 25 tablet 0    Omega-3 Krill Oil 300 MG CAPS Take 1 tablet by mouth daily        No current facility-administered medications for this visit.       Reviewed with patient and will remain unchanged except as mentioned in A/P  PHYSICAL EXAM     Vitals:    09/19/18 1431   BP: (!) 120/58   Pulse: 70   SpO2: 97%      Gen Alert, coop, no distress Heart  RRR, no MRG, nl apic impulse   Head NC, AT, no abnorm Abd  Soft, NT, +BS, no mass, no OM   Eyes PERRLA, conj/corn clear Ext  Ext nl, AT, no C/C/E   Nose Nares nl, no drain, NT Pulse 2+ and symmetric   Throat Lips, mucosa, tongue nl Skin Color/text/turg nl, no rash/lesions   Neck S/S, TM, NT, no bruit/JVD Psych Nl mood and affect   Lung CTA-B, unlabored, no DTP Lymph   No cervical or axillary LA   Ch wall NT, no deform Neuro  Nl gross M/S exam     ASSESSMENT AND PLAN     ~CAD   Date EF Results   Sx   No concerning   Hx 3/15  CABGx3 LIMA-LAD, SVG-OM2, SVG-RCA Karissa Arreola)   [facility] 3/15  MVD->CABG   MPI   No recent   TTE 3/15 60%    Plan   Continue aggressive medical treatment at doses above  Stop smoking   ~HTN  Today BP [x] Controlled [] Borderline [] Uncontrolled   Counseling [x] Diet/Salt [x] Exercise [x] Weight    Plan Continue current medications at doses detailed above  ~Hyperchol  Goal LDL [] <100 [x] <70     Counseling [x] Diet [x] Weight [x] Exercise   Lipid/liver Monitor [x] PCP [] Cardio [] Endocrine   Plan Continue current doses of meds listed above  6/18 HDL:42, LDL:91  ~Tobacco  Status [x] Smoker [] Previous smoker   Counseling [x] Risks [x] Cessation   Plan Continued avoidance of first and second hand smoke  ~Followup  Interval: 6 months  Tests/Labs: none    Scribe Attestation

## 2018-09-21 ENCOUNTER — OFFICE VISIT (OUTPATIENT)
Dept: INTERNAL MEDICINE CLINIC | Age: 78
End: 2018-09-21

## 2018-09-21 VITALS
DIASTOLIC BLOOD PRESSURE: 62 MMHG | WEIGHT: 226 LBS | HEIGHT: 60 IN | HEART RATE: 84 BPM | SYSTOLIC BLOOD PRESSURE: 134 MMHG | BODY MASS INDEX: 44.37 KG/M2

## 2018-09-21 DIAGNOSIS — Z23 NEED FOR INFLUENZA VACCINATION: ICD-10-CM

## 2018-09-21 DIAGNOSIS — J31.0 CHRONIC RHINITIS: ICD-10-CM

## 2018-09-21 DIAGNOSIS — M17.12 PRIMARY OSTEOARTHRITIS OF LEFT KNEE: ICD-10-CM

## 2018-09-21 DIAGNOSIS — Z79.899 CHRONIC USE OF BENZODIAZEPINE FOR THERAPEUTIC PURPOSE: Primary | ICD-10-CM

## 2018-09-21 DIAGNOSIS — R42 VERTIGO: ICD-10-CM

## 2018-09-21 PROCEDURE — G8399 PT W/DXA RESULTS DOCUMENT: HCPCS | Performed by: FAMILY MEDICINE

## 2018-09-21 PROCEDURE — 1101F PT FALLS ASSESS-DOCD LE1/YR: CPT | Performed by: FAMILY MEDICINE

## 2018-09-21 PROCEDURE — 90662 IIV NO PRSV INCREASED AG IM: CPT | Performed by: FAMILY MEDICINE

## 2018-09-21 PROCEDURE — 4004F PT TOBACCO SCREEN RCVD TLK: CPT | Performed by: FAMILY MEDICINE

## 2018-09-21 PROCEDURE — 1090F PRES/ABSN URINE INCON ASSESS: CPT | Performed by: FAMILY MEDICINE

## 2018-09-21 PROCEDURE — 1123F ACP DISCUSS/DSCN MKR DOCD: CPT | Performed by: FAMILY MEDICINE

## 2018-09-21 PROCEDURE — 99213 OFFICE O/P EST LOW 20 MIN: CPT | Performed by: FAMILY MEDICINE

## 2018-09-21 PROCEDURE — G8598 ASA/ANTIPLAT THER USED: HCPCS | Performed by: FAMILY MEDICINE

## 2018-09-21 PROCEDURE — 4040F PNEUMOC VAC/ADMIN/RCVD: CPT | Performed by: FAMILY MEDICINE

## 2018-09-21 PROCEDURE — G0008 ADMIN INFLUENZA VIRUS VAC: HCPCS | Performed by: FAMILY MEDICINE

## 2018-09-21 PROCEDURE — G8417 CALC BMI ABV UP PARAM F/U: HCPCS | Performed by: FAMILY MEDICINE

## 2018-09-21 PROCEDURE — G8427 DOCREV CUR MEDS BY ELIG CLIN: HCPCS | Performed by: FAMILY MEDICINE

## 2018-09-21 RX ORDER — CLONAZEPAM 0.5 MG/1
.25-.5 TABLET ORAL 2 TIMES DAILY PRN
Qty: 90 TABLET | Refills: 0 | Status: SHIPPED | OUTPATIENT
Start: 2018-10-01 | End: 2018-12-13 | Stop reason: SDUPTHER

## 2018-09-21 RX ORDER — CLONAZEPAM 0.5 MG/1
.25-.5 TABLET ORAL 2 TIMES DAILY PRN
Qty: 90 TABLET | Refills: 0 | Status: SHIPPED | OUTPATIENT
Start: 2018-10-01 | End: 2018-09-21 | Stop reason: SDUPTHER

## 2018-09-21 RX ORDER — FLUTICASONE PROPIONATE 50 MCG
1 SPRAY, SUSPENSION (ML) NASAL DAILY
Qty: 3 BOTTLE | Refills: 3 | Status: SHIPPED | OUTPATIENT
Start: 2018-09-21 | End: 2020-03-04

## 2018-09-21 NOTE — PATIENT INSTRUCTIONS
If the voltaren = diclofenac gel does not help the knee pain, I will send on Colchicine to see if it helps the inflammation and pain.

## 2018-09-21 NOTE — PROGRESS NOTES
Subjective:      Patient ID: Cleve Gutierrez is a 68 y.o. female. HPI   Chief Complaint   Patient presents with    3 Month Follow-Up     12 week med refills. 90 day follow up for controlled substance(s):  clonazepam  Using medication for:  vertigo  Functional improvement noted compared to before taking this controlled medication:  Able to sleep and not be dizzy  Adverse effects:  none    Aberrant behavior: none  Lorazepam Equiv dose (if applicable)  0.16  Morphine Equiv dose (if applicable)  na  OD risk: 220    \"I need something for pain. \"  Her knees hurt. Mostly the left one. She is taking anacin. Not much pain sitting. She has pain walking. It is not waking her up but takes her pills. She feels it is swollen now and now not just fat. The prednisone did help her heal.  She took 2 weeks of Prednisone. It did help until she cut back on the dose. While on the prednisone, she walked 2 full days without the walker and felt good but the prednisone did make her eat more. Review of Systems    Objective:   Physical Exam   Constitutional: She is oriented to person, place, and time. She appears well-developed and well-nourished. Cardiovascular: Normal rate, regular rhythm, normal heart sounds and intact distal pulses. Pulmonary/Chest: Effort normal and breath sounds normal.   Musculoskeletal:        Left knee: She exhibits swelling. She exhibits no effusion and no erythema. Tenderness found. Medial joint line and lateral joint line tenderness noted. Neurological: She is alert and oriented to person, place, and time. Coordination and gait normal.   Skin: Skin is warm and dry. No erythema. No pallor. Psychiatric: She has a normal mood and affect. Her behavior is normal. Cognition and memory are normal.   Vitals reviewed.          Wt Readings from Last 3 Encounters:   09/21/18 226 lb (102.5 kg)   08/17/18 225 lb (102.1 kg)   06/29/18 219 lb (99.3 kg)     Temp Readings from Last 3 Encounters:

## 2018-09-24 ENCOUNTER — TELEPHONE (OUTPATIENT)
Dept: INTERNAL MEDICINE CLINIC | Age: 78
End: 2018-09-24

## 2018-10-08 RX ORDER — VALSARTAN AND HYDROCHLOROTHIAZIDE 320; 25 MG/1; MG/1
TABLET, FILM COATED ORAL
Qty: 90 TABLET | Refills: 1 | Status: SHIPPED | OUTPATIENT
Start: 2018-10-08 | End: 2019-04-06 | Stop reason: SDUPTHER

## 2018-10-22 RX ORDER — OMEPRAZOLE 20 MG/1
CAPSULE, DELAYED RELEASE ORAL
Qty: 90 CAPSULE | Refills: 2 | Status: SHIPPED | OUTPATIENT
Start: 2018-10-22 | End: 2019-07-21 | Stop reason: SDUPTHER

## 2018-12-04 DIAGNOSIS — I25.10 CORONARY ARTERY DISEASE INVOLVING NATIVE CORONARY ARTERY OF NATIVE HEART WITHOUT ANGINA PECTORIS: ICD-10-CM

## 2018-12-04 RX ORDER — PITAVASTATIN CALCIUM 4.18 MG/1
TABLET, FILM COATED ORAL
Qty: 90 TABLET | Refills: 1 | Status: SHIPPED | OUTPATIENT
Start: 2018-12-04 | End: 2019-06-02 | Stop reason: SDUPTHER

## 2018-12-13 ENCOUNTER — OFFICE VISIT (OUTPATIENT)
Dept: INTERNAL MEDICINE CLINIC | Age: 78
End: 2018-12-13
Payer: MEDICARE

## 2018-12-13 VITALS
BODY MASS INDEX: 44.21 KG/M2 | WEIGHT: 225.2 LBS | DIASTOLIC BLOOD PRESSURE: 80 MMHG | HEIGHT: 60 IN | SYSTOLIC BLOOD PRESSURE: 120 MMHG | HEART RATE: 76 BPM

## 2018-12-13 DIAGNOSIS — R05.3 CHRONIC COUGH: ICD-10-CM

## 2018-12-13 DIAGNOSIS — Z79.899 CHRONIC USE OF BENZODIAZEPINE FOR THERAPEUTIC PURPOSE: ICD-10-CM

## 2018-12-13 DIAGNOSIS — E78.00 HYPERCHOLESTEREMIA: ICD-10-CM

## 2018-12-13 DIAGNOSIS — R42 VERTIGO: ICD-10-CM

## 2018-12-13 DIAGNOSIS — J44.9 COPD, MILD (HCC): ICD-10-CM

## 2018-12-13 DIAGNOSIS — R73.09 IMPAIRED GLUCOSE METABOLISM: ICD-10-CM

## 2018-12-13 DIAGNOSIS — D50.9 IRON DEFICIENCY ANEMIA, UNSPECIFIED IRON DEFICIENCY ANEMIA TYPE: ICD-10-CM

## 2018-12-13 DIAGNOSIS — F17.200 TOBACCO DEPENDENCE: ICD-10-CM

## 2018-12-13 DIAGNOSIS — N18.30 CHRONIC KIDNEY DISEASE, STAGE III (MODERATE) (HCC): ICD-10-CM

## 2018-12-13 DIAGNOSIS — M17.12 PRIMARY OSTEOARTHRITIS OF LEFT KNEE: ICD-10-CM

## 2018-12-13 DIAGNOSIS — I25.10 CORONARY ARTERY DISEASE INVOLVING NATIVE CORONARY ARTERY OF NATIVE HEART WITHOUT ANGINA PECTORIS: Primary | ICD-10-CM

## 2018-12-13 DIAGNOSIS — E53.8 B12 DEFICIENCY: ICD-10-CM

## 2018-12-13 LAB
A/G RATIO: 1.6 (ref 1.1–2.2)
ALBUMIN SERPL-MCNC: 4.4 G/DL (ref 3.4–5)
ALP BLD-CCNC: 120 U/L (ref 40–129)
ALT SERPL-CCNC: 14 U/L (ref 10–40)
ANION GAP SERPL CALCULATED.3IONS-SCNC: 14 MMOL/L (ref 3–16)
AST SERPL-CCNC: 15 U/L (ref 15–37)
BASOPHILS ABSOLUTE: 0.1 K/UL (ref 0–0.2)
BASOPHILS RELATIVE PERCENT: 0.8 %
BILIRUB SERPL-MCNC: 0.4 MG/DL (ref 0–1)
BUN BLDV-MCNC: 24 MG/DL (ref 7–20)
CALCIUM SERPL-MCNC: 10.2 MG/DL (ref 8.3–10.6)
CHLORIDE BLD-SCNC: 100 MMOL/L (ref 99–110)
CHOLESTEROL, TOTAL: 181 MG/DL (ref 0–199)
CO2: 26 MMOL/L (ref 21–32)
CREAT SERPL-MCNC: 1.2 MG/DL (ref 0.6–1.2)
EOSINOPHILS ABSOLUTE: 0.2 K/UL (ref 0–0.6)
EOSINOPHILS RELATIVE PERCENT: 1.8 %
GFR AFRICAN AMERICAN: 53
GFR NON-AFRICAN AMERICAN: 43
GLOBULIN: 2.7 G/DL
GLUCOSE BLD-MCNC: 108 MG/DL (ref 70–99)
HCT VFR BLD CALC: 42.4 % (ref 36–48)
HDLC SERPL-MCNC: 45 MG/DL (ref 40–60)
HEMOGLOBIN: 13.7 G/DL (ref 12–16)
IRON SATURATION: 16 % (ref 15–50)
IRON: 61 UG/DL (ref 37–145)
LDL CHOLESTEROL CALCULATED: 105 MG/DL
LYMPHOCYTES ABSOLUTE: 3.6 K/UL (ref 1–5.1)
LYMPHOCYTES RELATIVE PERCENT: 32.7 %
MCH RBC QN AUTO: 29 PG (ref 26–34)
MCHC RBC AUTO-ENTMCNC: 32.3 G/DL (ref 31–36)
MCV RBC AUTO: 89.6 FL (ref 80–100)
MONOCYTES ABSOLUTE: 0.7 K/UL (ref 0–1.3)
MONOCYTES RELATIVE PERCENT: 6.6 %
NEUTROPHILS ABSOLUTE: 6.4 K/UL (ref 1.7–7.7)
NEUTROPHILS RELATIVE PERCENT: 58.1 %
PDW BLD-RTO: 15.6 % (ref 12.4–15.4)
PLATELET # BLD: 349 K/UL (ref 135–450)
PMV BLD AUTO: 8.2 FL (ref 5–10.5)
POTASSIUM SERPL-SCNC: 4.3 MMOL/L (ref 3.5–5.1)
RBC # BLD: 4.73 M/UL (ref 4–5.2)
SODIUM BLD-SCNC: 140 MMOL/L (ref 136–145)
TOTAL IRON BINDING CAPACITY: 389 UG/DL (ref 260–445)
TOTAL PROTEIN: 7.1 G/DL (ref 6.4–8.2)
TRIGL SERPL-MCNC: 157 MG/DL (ref 0–150)
URIC ACID, SERUM: 9.5 MG/DL (ref 2.6–6)
VLDLC SERPL CALC-MCNC: 31 MG/DL
WBC # BLD: 11 K/UL (ref 4–11)

## 2018-12-13 PROCEDURE — G8482 FLU IMMUNIZE ORDER/ADMIN: HCPCS | Performed by: FAMILY MEDICINE

## 2018-12-13 PROCEDURE — 1101F PT FALLS ASSESS-DOCD LE1/YR: CPT | Performed by: FAMILY MEDICINE

## 2018-12-13 PROCEDURE — G8399 PT W/DXA RESULTS DOCUMENT: HCPCS | Performed by: FAMILY MEDICINE

## 2018-12-13 PROCEDURE — 1090F PRES/ABSN URINE INCON ASSESS: CPT | Performed by: FAMILY MEDICINE

## 2018-12-13 PROCEDURE — G8926 SPIRO NO PERF OR DOC: HCPCS | Performed by: FAMILY MEDICINE

## 2018-12-13 PROCEDURE — G8417 CALC BMI ABV UP PARAM F/U: HCPCS | Performed by: FAMILY MEDICINE

## 2018-12-13 PROCEDURE — 3023F SPIROM DOC REV: CPT | Performed by: FAMILY MEDICINE

## 2018-12-13 PROCEDURE — 99214 OFFICE O/P EST MOD 30 MIN: CPT | Performed by: FAMILY MEDICINE

## 2018-12-13 PROCEDURE — 4004F PT TOBACCO SCREEN RCVD TLK: CPT | Performed by: FAMILY MEDICINE

## 2018-12-13 PROCEDURE — 4040F PNEUMOC VAC/ADMIN/RCVD: CPT | Performed by: FAMILY MEDICINE

## 2018-12-13 PROCEDURE — 1123F ACP DISCUSS/DSCN MKR DOCD: CPT | Performed by: FAMILY MEDICINE

## 2018-12-13 PROCEDURE — G8598 ASA/ANTIPLAT THER USED: HCPCS | Performed by: FAMILY MEDICINE

## 2018-12-13 PROCEDURE — G8427 DOCREV CUR MEDS BY ELIG CLIN: HCPCS | Performed by: FAMILY MEDICINE

## 2018-12-13 RX ORDER — BENZONATATE 100 MG/1
100 CAPSULE ORAL 3 TIMES DAILY PRN
Qty: 270 CAPSULE | Refills: 1 | Status: SHIPPED | OUTPATIENT
Start: 2018-12-13 | End: 2019-09-12

## 2018-12-13 RX ORDER — CLONAZEPAM 0.5 MG/1
.25-.5 TABLET ORAL NIGHTLY PRN
Qty: 90 TABLET | Refills: 0 | Status: SHIPPED | OUTPATIENT
Start: 2018-12-13 | End: 2019-02-12

## 2018-12-13 NOTE — PROGRESS NOTES
Subjective:      Patient ID: Aubrie Dallas is a 66 y.o. female. HPI   Chief Complaint   Patient presents with    Check-Up     12 wk tyrese med check      6 month FU of IGT, HLP, HTN and COPD    Also 90 day FU for clonazepam refill. The first tube of Voltaren gel was $60 and this is expensive for her, but it does help her knee pain a lot; She called [facility] and they sent her a generic tube and it worked just as well for a lower cost.    90 day follow up for controlled substance(s):  Clonazepam.   Using medication for:  Vertigo and insomnia  Functional improvement noted compared to before taking this controlled medication:  Able to sleep  Adverse effects:  none    Aberrant behavior: none  Lorazepam Equiv dose (if applicable)  1 mg LME with actual refill hx  Morphine Equiv dose (if applicable)  NA    No chest pain. Still smoking and not wanting to quit right now. She is mostly sedentary so her COPD does not really bother her.      No other c/o    Patient Active Problem List   Diagnosis    Impaired glucose metabolism    Hypercholesteremia    Albinoidism ([address]    Coronary artery disease involving native coronary artery of native heart without angina pectoris    Hearing impaired    Vertigo    GERD (gastroesophageal reflux disease)    Intertrigo    Abnormal EKG    Essential hypertension    History of melanoma    Iron deficiency anemia    Tobacco dependence    Chest wall pain following surgery    Sacroiliac pain    Chronic rhinitis    B12 deficiency    Blepharitis of lower eyelids of both eyes    Chronic obstructive pulmonary disease ([address]    Morbid obesity ([address]    Recurrent major depression in remission ([address]    Chronic use of benzodiazepine for therapeutic purpose    Acute bilateral low back pain without sciatica    Spasm of lumbar paraspinous muscle    Ganglion cyst of volar aspect of left wrist         Outpatient Prescriptions Marked as Taking for the 12/13/18 encounter (Office confusion, decreased concentration and dysphoric mood. Objective:   Physical Exam   Constitutional: She is oriented to person, place, and time. She appears well-developed and well-nourished. No distress. Eyes: Conjunctivae are normal. No scleral icterus. Neck: Carotid bruit is not present. No thyroid mass and no thyromegaly present. Cardiovascular: Normal rate, regular rhythm, S1 normal, S2 normal, normal heart sounds and intact distal pulses. No murmur heard. Pulmonary/Chest: Effort normal and breath sounds normal. No respiratory distress. She has no decreased breath sounds. She has no wheezes. She has no rhonchi. She has no rales. Abdominal: Soft. Normal appearance and bowel sounds are normal. She exhibits no abdominal bruit and no mass. There is no hepatomegaly. There is no tenderness. Neurological: She is alert and oriented to person, place, and time. She displays no tremor. She exhibits normal muscle tone. Coordination and gait normal.   Skin: Skin is warm and dry. She is not diaphoretic. No cyanosis. No pallor. Nails show no clubbing. Psychiatric: She has a normal mood and affect. Her speech is normal and behavior is normal. Cognition and memory are normal.   Vitals reviewed. Assessment:      1. Chronic cough  She feels she benefits from med. Does not want counseling or treatment for smoking cessation. Knows she should be not ready.   - benzonatate (TESSALON PERLES) 100 MG capsule; Take 1 capsule by mouth 3 times daily as needed for Cough  Dispense: 270 capsule; Refill: 1    2. COPD, mild (HCC)  - benzonatate (TESSALON PERLES) 100 MG capsule; Take 1 capsule by mouth 3 times daily as needed for Cough  Dispense: 270 capsule; Refill: 1    3. Primary osteoarthritis of left knee  Use gel prn.   - diclofenac sodium 1 % GEL; Apply 2-4 g topically 4 times daily as needed for Pain  Dispense: 3 Tube; Refill: 3    4.  Coronary artery disease involving native coronary artery of native heart

## 2018-12-14 LAB
ESTIMATED AVERAGE GLUCOSE: 125.5 MG/DL
HBA1C MFR BLD: 6 %
VITAMIN B-12: 1400 PG/ML (ref 211–911)

## 2018-12-15 PROBLEM — N18.30 CHRONIC KIDNEY DISEASE, STAGE III (MODERATE) (HCC): Status: ACTIVE | Noted: 2018-12-15

## 2018-12-15 ASSESSMENT — ENCOUNTER SYMPTOMS
CHEST TIGHTNESS: 0
COUGH: 0
SHORTNESS OF BREATH: 0
WHEEZING: 0

## 2018-12-16 DIAGNOSIS — E79.0 HYPERURICEMIA: Primary | ICD-10-CM

## 2018-12-16 RX ORDER — ALLOPURINOL 100 MG/1
TABLET ORAL
Qty: 45 TABLET | Refills: 0 | Status: SHIPPED | OUTPATIENT
Start: 2018-12-16 | End: 2019-01-12 | Stop reason: SDUPTHER

## 2018-12-20 DIAGNOSIS — F33.0 MILD EPISODE OF RECURRENT MAJOR DEPRESSIVE DISORDER (HCC): ICD-10-CM

## 2018-12-20 RX ORDER — DULOXETIN HYDROCHLORIDE 60 MG/1
CAPSULE, DELAYED RELEASE ORAL
Qty: 90 CAPSULE | Refills: 3 | Status: SHIPPED | OUTPATIENT
Start: 2018-12-20 | End: 2019-12-15 | Stop reason: SDUPTHER

## 2018-12-27 ENCOUNTER — TELEPHONE (OUTPATIENT)
Dept: INTERNAL MEDICINE CLINIC | Age: 78
End: 2018-12-27

## 2019-01-12 DIAGNOSIS — E79.0 HYPERURICEMIA: ICD-10-CM

## 2019-01-14 RX ORDER — ALLOPURINOL 100 MG/1
TABLET ORAL
Qty: 45 TABLET | Refills: 0 | Status: SHIPPED | OUTPATIENT
Start: 2019-01-14 | End: 2019-02-11 | Stop reason: SDUPTHER

## 2019-03-07 ENCOUNTER — OFFICE VISIT (OUTPATIENT)
Dept: INTERNAL MEDICINE CLINIC | Age: 79
End: 2019-03-07
Payer: MEDICARE

## 2019-03-07 VITALS
HEIGHT: 60 IN | BODY MASS INDEX: 44.25 KG/M2 | SYSTOLIC BLOOD PRESSURE: 122 MMHG | HEART RATE: 60 BPM | WEIGHT: 225.4 LBS | DIASTOLIC BLOOD PRESSURE: 72 MMHG

## 2019-03-07 DIAGNOSIS — R42 VERTIGO: ICD-10-CM

## 2019-03-07 DIAGNOSIS — Z79.899 CHRONIC USE OF BENZODIAZEPINE FOR THERAPEUTIC PURPOSE: Primary | ICD-10-CM

## 2019-03-07 DIAGNOSIS — M62.830 SPASM OF LUMBAR PARASPINOUS MUSCLE: ICD-10-CM

## 2019-03-07 DIAGNOSIS — J44.9 CHRONIC OBSTRUCTIVE PULMONARY DISEASE, UNSPECIFIED COPD TYPE (HCC): ICD-10-CM

## 2019-03-07 PROCEDURE — G8926 SPIRO NO PERF OR DOC: HCPCS | Performed by: FAMILY MEDICINE

## 2019-03-07 PROCEDURE — G8399 PT W/DXA RESULTS DOCUMENT: HCPCS | Performed by: FAMILY MEDICINE

## 2019-03-07 PROCEDURE — G8482 FLU IMMUNIZE ORDER/ADMIN: HCPCS | Performed by: FAMILY MEDICINE

## 2019-03-07 PROCEDURE — G8427 DOCREV CUR MEDS BY ELIG CLIN: HCPCS | Performed by: FAMILY MEDICINE

## 2019-03-07 PROCEDURE — 1090F PRES/ABSN URINE INCON ASSESS: CPT | Performed by: FAMILY MEDICINE

## 2019-03-07 PROCEDURE — 4004F PT TOBACCO SCREEN RCVD TLK: CPT | Performed by: FAMILY MEDICINE

## 2019-03-07 PROCEDURE — G8417 CALC BMI ABV UP PARAM F/U: HCPCS | Performed by: FAMILY MEDICINE

## 2019-03-07 PROCEDURE — 1123F ACP DISCUSS/DSCN MKR DOCD: CPT | Performed by: FAMILY MEDICINE

## 2019-03-07 PROCEDURE — G8598 ASA/ANTIPLAT THER USED: HCPCS | Performed by: FAMILY MEDICINE

## 2019-03-07 PROCEDURE — 4040F PNEUMOC VAC/ADMIN/RCVD: CPT | Performed by: FAMILY MEDICINE

## 2019-03-07 PROCEDURE — 3023F SPIROM DOC REV: CPT | Performed by: FAMILY MEDICINE

## 2019-03-07 PROCEDURE — 99213 OFFICE O/P EST LOW 20 MIN: CPT | Performed by: FAMILY MEDICINE

## 2019-03-07 PROCEDURE — 1101F PT FALLS ASSESS-DOCD LE1/YR: CPT | Performed by: FAMILY MEDICINE

## 2019-03-07 RX ORDER — CLONAZEPAM 0.5 MG/1
.25-.5 TABLET ORAL NIGHTLY PRN
Qty: 90 TABLET | Refills: 0 | Status: SHIPPED | OUTPATIENT
Start: 2019-03-07 | End: 2019-06-06 | Stop reason: ALTCHOICE

## 2019-03-07 RX ORDER — MECLIZINE HCL 12.5 MG/1
12.5 TABLET ORAL 2 TIMES DAILY PRN
Qty: 60 TABLET | Refills: 1 | Status: SHIPPED | OUTPATIENT
Start: 2019-03-07 | End: 2020-01-13 | Stop reason: ALTCHOICE

## 2019-03-07 RX ORDER — TIZANIDINE HYDROCHLORIDE 2 MG/1
2 CAPSULE, GELATIN COATED ORAL 3 TIMES DAILY PRN
Qty: 30 CAPSULE | Refills: 1 | Status: SHIPPED | OUTPATIENT
Start: 2019-03-07 | End: 2019-09-12 | Stop reason: SDUPTHER

## 2019-03-07 RX ORDER — ALBUTEROL SULFATE 90 UG/1
2 AEROSOL, METERED RESPIRATORY (INHALATION) EVERY 4 HOURS PRN
Qty: 1 INHALER | Refills: 2 | Status: SHIPPED | OUTPATIENT
Start: 2019-03-07 | End: 2022-09-23 | Stop reason: SDUPTHER

## 2019-03-08 ENCOUNTER — TELEPHONE (OUTPATIENT)
Dept: RHEUMATOLOGY | Age: 79
End: 2019-03-08

## 2019-03-20 RX ORDER — METOPROLOL TARTRATE 50 MG/1
TABLET, FILM COATED ORAL
Qty: 180 TABLET | Refills: 0 | Status: SHIPPED | OUTPATIENT
Start: 2019-03-20 | End: 2019-06-17 | Stop reason: SDUPTHER

## 2019-04-08 RX ORDER — VALSARTAN AND HYDROCHLOROTHIAZIDE 320; 25 MG/1; MG/1
TABLET, FILM COATED ORAL
Qty: 90 TABLET | Refills: 1 | Status: SHIPPED | OUTPATIENT
Start: 2019-04-08 | End: 2019-10-05 | Stop reason: SDUPTHER

## 2019-06-02 DIAGNOSIS — I25.10 CORONARY ARTERY DISEASE INVOLVING NATIVE CORONARY ARTERY OF NATIVE HEART WITHOUT ANGINA PECTORIS: ICD-10-CM

## 2019-06-03 RX ORDER — PITAVASTATIN CALCIUM 4.18 MG/1
TABLET, FILM COATED ORAL
Qty: 90 TABLET | Refills: 1 | Status: SHIPPED | OUTPATIENT
Start: 2019-06-03 | End: 2019-09-18 | Stop reason: SDUPTHER

## 2019-06-06 ENCOUNTER — OFFICE VISIT (OUTPATIENT)
Dept: INTERNAL MEDICINE CLINIC | Age: 79
End: 2019-06-06
Payer: MEDICARE

## 2019-06-06 VITALS
SYSTOLIC BLOOD PRESSURE: 122 MMHG | HEART RATE: 68 BPM | BODY MASS INDEX: 45.23 KG/M2 | HEIGHT: 60 IN | WEIGHT: 230.4 LBS | DIASTOLIC BLOOD PRESSURE: 60 MMHG

## 2019-06-06 DIAGNOSIS — E66.01 MORBID OBESITY WITH BMI OF 45.0-49.9, ADULT (HCC): ICD-10-CM

## 2019-06-06 DIAGNOSIS — E79.0 HYPERURICEMIA: ICD-10-CM

## 2019-06-06 DIAGNOSIS — F33.40 RECURRENT MAJOR DEPRESSION IN REMISSION (HCC): ICD-10-CM

## 2019-06-06 DIAGNOSIS — E78.00 HYPERCHOLESTEREMIA: ICD-10-CM

## 2019-06-06 DIAGNOSIS — R73.09 IMPAIRED GLUCOSE METABOLISM: ICD-10-CM

## 2019-06-06 DIAGNOSIS — E70.30 ALBINOIDISM (HCC): ICD-10-CM

## 2019-06-06 DIAGNOSIS — J44.9 COPD, MILD (HCC): ICD-10-CM

## 2019-06-06 DIAGNOSIS — I25.10 CORONARY ARTERY DISEASE INVOLVING NATIVE CORONARY ARTERY OF NATIVE HEART WITHOUT ANGINA PECTORIS: ICD-10-CM

## 2019-06-06 DIAGNOSIS — N18.30 CHRONIC KIDNEY DISEASE, STAGE III (MODERATE) (HCC): Primary | ICD-10-CM

## 2019-06-06 DIAGNOSIS — K21.9 GASTROESOPHAGEAL REFLUX DISEASE, ESOPHAGITIS PRESENCE NOT SPECIFIED: ICD-10-CM

## 2019-06-06 PROCEDURE — 1090F PRES/ABSN URINE INCON ASSESS: CPT | Performed by: FAMILY MEDICINE

## 2019-06-06 PROCEDURE — G8926 SPIRO NO PERF OR DOC: HCPCS | Performed by: FAMILY MEDICINE

## 2019-06-06 PROCEDURE — 3023F SPIROM DOC REV: CPT | Performed by: FAMILY MEDICINE

## 2019-06-06 PROCEDURE — G8427 DOCREV CUR MEDS BY ELIG CLIN: HCPCS | Performed by: FAMILY MEDICINE

## 2019-06-06 PROCEDURE — G8417 CALC BMI ABV UP PARAM F/U: HCPCS | Performed by: FAMILY MEDICINE

## 2019-06-06 PROCEDURE — G8399 PT W/DXA RESULTS DOCUMENT: HCPCS | Performed by: FAMILY MEDICINE

## 2019-06-06 PROCEDURE — G8598 ASA/ANTIPLAT THER USED: HCPCS | Performed by: FAMILY MEDICINE

## 2019-06-06 PROCEDURE — 4040F PNEUMOC VAC/ADMIN/RCVD: CPT | Performed by: FAMILY MEDICINE

## 2019-06-06 PROCEDURE — 4004F PT TOBACCO SCREEN RCVD TLK: CPT | Performed by: FAMILY MEDICINE

## 2019-06-06 PROCEDURE — 1123F ACP DISCUSS/DSCN MKR DOCD: CPT | Performed by: FAMILY MEDICINE

## 2019-06-06 PROCEDURE — 99214 OFFICE O/P EST MOD 30 MIN: CPT | Performed by: FAMILY MEDICINE

## 2019-06-06 NOTE — PROGRESS NOTES
breath and wheezing. Cardiovascular: Negative for chest pain, palpitations and leg swelling. Skin: Negative for rash and wound. Neurological: Positive for dizziness (chronic stable). Negative for syncope, facial asymmetry, speech difficulty, weakness, numbness and headaches. Objective:   Physical Exam   Constitutional: She is oriented to person, place, and time. She appears well-developed and well-nourished. No distress. Eyes: Conjunctivae are normal. No scleral icterus. Neck: Normal range of motion. Neck supple. Carotid bruit is not present. No thyroid mass and no thyromegaly present. Cardiovascular: Normal rate, regular rhythm, S1 normal, S2 normal and intact distal pulses. Exam reveals distant heart sounds. No murmur heard. Pulmonary/Chest: Effort normal. No respiratory distress. She has decreased breath sounds. She has no wheezes. She has no rhonchi. She has no rales. Abdominal: Soft. Normal appearance and bowel sounds are normal. She exhibits no abdominal bruit and no mass. There is no hepatomegaly. There is no tenderness. Musculoskeletal:        Right knee: Tenderness found. Left knee: Tenderness found. Tenderness bilaterally at the medial proximal tibia in the pes ancerine area. Knees thick and could be a little swollen. Lymphadenopathy:     She has no cervical adenopathy. Neurological: She is alert and oriented to person, place, and time. She displays no tremor. She exhibits normal muscle tone. Coordination and gait normal.   Skin: Skin is warm and dry. She is not diaphoretic. No cyanosis. No pallor. Nails show no clubbing. Psychiatric: She has a normal mood and affect. Her speech is normal and behavior is normal. Cognition and memory are normal.   Vitals reviewed.       Wt Readings from Last 3 Encounters:   06/06/19 230 lb 6.4 oz (104.5 kg)   03/07/19 225 lb 6.4 oz (102.2 kg)   12/13/18 225 lb 3.2 oz (102.2 kg)     Temp Readings from Last 3 Encounters:   12/08/17 98.3 °F (36.8 °C) (Oral)   12/02/16 98.1 °F (36.7 °C)   05/21/15 98 °F (36.7 °C) (Oral)     BP Readings from Last 3 Encounters:   06/06/19 122/60   03/07/19 122/72   12/13/18 120/80     Pulse Readings from Last 3 Encounters:   06/06/19 68   03/07/19 60   12/13/18 76     Lab Results   Component Value Date    LABA1C 6.0 12/13/2018     Lab Results   Component Value Date    .5 12/13/2018       Lab Results   Component Value Date    LDLCALC 105 (H) 12/13/2018    LDLDIRECT 103 (H) 11/27/2017       Lab Results   Component Value Date    WBC 11.0 12/13/2018    HGB 13.7 12/13/2018    HCT 42.4 12/13/2018    MCV 89.6 12/13/2018     12/13/2018       Assessment:      1. Chronic kidney disease, stage III (moderate) ([facility])  Avoid oral NSAIDs. No cola's. Push water. On ARB for BP  - Comprehensive Metabolic Panel  - Uric Acid  - CBC Auto Differential    2. COPD, mild ([address]  Still smoking. Abstinence advised. She does not want counseling on cessation. Not ready. 3. Hypercholesteremia  On Livalo. Allergic to other statins. Not to goal but she does not want to increase dose or add other med. - Comprehensive Metabolic Panel  - LDL Cholesterol, Direct    4. Hyperuricemia  High elevation in Dec.  Now on allopurinol.   - Comprehensive Metabolic Panel  - Uric Acid    5. Coronary artery disease involving native coronary artery of native heart without angina pectoris  No chest pain. On statin, BB, prn NTG. BP is under control.  - LDL Cholesterol, Direct    6. Impaired glucose metabolism  Diet counseling. Weight loss advised. Educated on portion sizes. - Hemoglobin A1C  - Comprehensive Metabolic Panel    7. Gastroesophageal reflux disease, esophagitis presence not specified  On daily PPI. Check labs. - Magnesium  - Vitamin B12          Plan:      See orders. See after visit summary, patient instructions, and reference hand-outs. A PRINTED SUMMARY = AVS GIVEN TO THE PATIENT.     Discussed use, benefit, and side effects of prescribed medications. Barriers to medication compliance addressed. All patient questions answered.           Stacey Man MD

## 2019-06-06 NOTE — PATIENT INSTRUCTIONS
back of your knee flat down to the floor. Hold your knee straight. 3. Keeping the thigh muscles tight and your leg straight, lift your affected leg up so that your heel is about 12 inches off the floor. 4. Hold for about 6 seconds, then lower slowly. Rest for up to 10 seconds between repetitions. 5. Repeat 8 to 12 times. Follow-up care is a key part of your treatment and safety. Be sure to make and go to all appointments, and call your doctor if you are having problems. It's also a good idea to know your test results and keep a list of the medicines you take. Where can you learn more? Go to [email or web address] and sign in to your [employer] account. Enter I242 in the YUPPTV box to learn more about \"Knee (Pes Anserine) Bursitis: Exercises. \"     If you do not have an account, please click on the \"Sign Up Now\" link. Current as of: September 20, 2018  Content Version: 12.0  © 8694-2948 Healthwise, Incorporated. Care instructions adapted under license by [facility] ([facility]). If you have questions about a medical condition or this instruction, always ask your healthcare professional. [place] [address] any warranty or liability for your use of this information.

## 2019-06-07 LAB
A/G RATIO: 1.7 (ref 1.1–2.2)
ALBUMIN SERPL-MCNC: 4.4 G/DL (ref 3.4–5)
ALP BLD-CCNC: 133 U/L (ref 40–129)
ALT SERPL-CCNC: 18 U/L (ref 10–40)
ANION GAP SERPL CALCULATED.3IONS-SCNC: 18 MMOL/L (ref 3–16)
AST SERPL-CCNC: 19 U/L (ref 15–37)
BASOPHILS ABSOLUTE: 0.1 K/UL (ref 0–0.2)
BASOPHILS RELATIVE PERCENT: 0.7 %
BILIRUB SERPL-MCNC: 0.3 MG/DL (ref 0–1)
BUN BLDV-MCNC: 29 MG/DL (ref 7–20)
CALCIUM SERPL-MCNC: 10.1 MG/DL (ref 8.3–10.6)
CHLORIDE BLD-SCNC: 105 MMOL/L (ref 99–110)
CO2: 22 MMOL/L (ref 21–32)
CREAT SERPL-MCNC: 1.2 MG/DL (ref 0.6–1.2)
EOSINOPHILS ABSOLUTE: 0.3 K/UL (ref 0–0.6)
EOSINOPHILS RELATIVE PERCENT: 2 %
ESTIMATED AVERAGE GLUCOSE: 145.6 MG/DL
GFR AFRICAN AMERICAN: 52
GFR NON-AFRICAN AMERICAN: 43
GLOBULIN: 2.6 G/DL
GLUCOSE BLD-MCNC: 128 MG/DL (ref 70–99)
HBA1C MFR BLD: 6.7 %
HCT VFR BLD CALC: 40.7 % (ref 36–48)
HEMOGLOBIN: 13.1 G/DL (ref 12–16)
LDL CHOLESTEROL DIRECT: 102 MG/DL
LYMPHOCYTES ABSOLUTE: 3.3 K/UL (ref 1–5.1)
LYMPHOCYTES RELATIVE PERCENT: 26 %
MAGNESIUM: 2.1 MG/DL (ref 1.8–2.4)
MCH RBC QN AUTO: 29.5 PG (ref 26–34)
MCHC RBC AUTO-ENTMCNC: 32.1 G/DL (ref 31–36)
MCV RBC AUTO: 91.9 FL (ref 80–100)
MONOCYTES ABSOLUTE: 1.1 K/UL (ref 0–1.3)
MONOCYTES RELATIVE PERCENT: 8.3 %
NEUTROPHILS ABSOLUTE: 8.1 K/UL (ref 1.7–7.7)
NEUTROPHILS RELATIVE PERCENT: 63 %
PDW BLD-RTO: 17.4 % (ref 12.4–15.4)
PLATELET # BLD: 323 K/UL (ref 135–450)
PMV BLD AUTO: 8.4 FL (ref 5–10.5)
POTASSIUM SERPL-SCNC: 4.2 MMOL/L (ref 3.5–5.1)
RBC # BLD: 4.43 M/UL (ref 4–5.2)
SODIUM BLD-SCNC: 145 MMOL/L (ref 136–145)
TOTAL PROTEIN: 7 G/DL (ref 6.4–8.2)
URIC ACID, SERUM: 6.2 MG/DL (ref 2.6–6)
VITAMIN B-12: 679 PG/ML (ref 211–911)
WBC # BLD: 12.9 K/UL (ref 4–11)

## 2019-06-08 ASSESSMENT — ENCOUNTER SYMPTOMS
SHORTNESS OF BREATH: 0
COUGH: 0
WHEEZING: 0
CHEST TIGHTNESS: 0

## 2019-06-09 PROBLEM — E11.9 DIET-CONTROLLED DIABETES MELLITUS (HCC): Status: ACTIVE | Noted: 2019-06-09

## 2019-06-11 DIAGNOSIS — I25.10 CORONARY ARTERY DISEASE INVOLVING NATIVE CORONARY ARTERY OF NATIVE HEART WITHOUT ANGINA PECTORIS: ICD-10-CM

## 2019-06-12 ENCOUNTER — TELEPHONE (OUTPATIENT)
Dept: INTERNAL MEDICINE CLINIC | Age: 79
End: 2019-06-12

## 2019-06-12 RX ORDER — EZETIMIBE 10 MG/1
10 TABLET ORAL DAILY
Qty: 90 TABLET | Refills: 1 | Status: SHIPPED | OUTPATIENT
Start: 2019-06-12 | End: 2021-01-25

## 2019-06-12 NOTE — TELEPHONE ENCOUNTER
Patient called back. Went over instructions from  several times. Says she would like the Zetia sent to [facility] on [address].

## 2019-06-17 RX ORDER — METOPROLOL TARTRATE 50 MG/1
TABLET, FILM COATED ORAL
Qty: 180 TABLET | Refills: 0 | Status: SHIPPED | OUTPATIENT
Start: 2019-06-17 | End: 2019-09-15 | Stop reason: SDUPTHER

## 2019-07-03 ENCOUNTER — OFFICE VISIT (OUTPATIENT)
Dept: CARDIOLOGY CLINIC | Age: 79
End: 2019-07-03
Payer: MEDICARE

## 2019-07-03 VITALS
SYSTOLIC BLOOD PRESSURE: 112 MMHG | WEIGHT: 225.8 LBS | HEIGHT: 60 IN | BODY MASS INDEX: 44.33 KG/M2 | DIASTOLIC BLOOD PRESSURE: 86 MMHG | HEART RATE: 68 BPM

## 2019-07-03 DIAGNOSIS — Z72.0 TOBACCO ABUSE: ICD-10-CM

## 2019-07-03 DIAGNOSIS — I25.10 CORONARY ARTERY DISEASE INVOLVING NATIVE CORONARY ARTERY OF NATIVE HEART WITHOUT ANGINA PECTORIS: Primary | ICD-10-CM

## 2019-07-03 DIAGNOSIS — I10 ESSENTIAL HYPERTENSION: ICD-10-CM

## 2019-07-03 DIAGNOSIS — E66.01 CLASS 3 SEVERE OBESITY DUE TO EXCESS CALORIES WITH BODY MASS INDEX (BMI) OF 40.0 TO 44.9 IN ADULT, UNSPECIFIED WHETHER SERIOUS COMORBIDITY PRESENT (HCC): ICD-10-CM

## 2019-07-03 DIAGNOSIS — E78.00 HYPERCHOLESTEREMIA: ICD-10-CM

## 2019-07-03 PROCEDURE — G8598 ASA/ANTIPLAT THER USED: HCPCS | Performed by: INTERNAL MEDICINE

## 2019-07-03 PROCEDURE — 1090F PRES/ABSN URINE INCON ASSESS: CPT | Performed by: INTERNAL MEDICINE

## 2019-07-03 PROCEDURE — 4040F PNEUMOC VAC/ADMIN/RCVD: CPT | Performed by: INTERNAL MEDICINE

## 2019-07-03 PROCEDURE — 99214 OFFICE O/P EST MOD 30 MIN: CPT | Performed by: INTERNAL MEDICINE

## 2019-07-03 PROCEDURE — G8417 CALC BMI ABV UP PARAM F/U: HCPCS | Performed by: INTERNAL MEDICINE

## 2019-07-03 PROCEDURE — G8427 DOCREV CUR MEDS BY ELIG CLIN: HCPCS | Performed by: INTERNAL MEDICINE

## 2019-07-03 PROCEDURE — G8399 PT W/DXA RESULTS DOCUMENT: HCPCS | Performed by: INTERNAL MEDICINE

## 2019-07-03 PROCEDURE — 1123F ACP DISCUSS/DSCN MKR DOCD: CPT | Performed by: INTERNAL MEDICINE

## 2019-07-03 PROCEDURE — 4004F PT TOBACCO SCREEN RCVD TLK: CPT | Performed by: INTERNAL MEDICINE

## 2019-07-03 NOTE — PROGRESS NOTES
[address]     H+P // CONSULT // OUTPATIENT VISIT // Sonia Purcell     Referring Doctor Isabel Holguin MD   Encounter Type Followup     CHIEF COMPLAINT     Visit Type Chronic   Symptoms None   Problems CAD s/p CABG 3/15, HTN, CHOL, TOB      HISTORY OF PRESENT ILLNESS      GEN - Doing well. No new concerns.  CAD - s/p CABG 3/15. Denies cp, sob, dizziness, syncope, palpitations.  HTN - Ambulatory BP readings in good range. No HA or dizziness.  CHOL - Last cholesterol reviewed and in good range. Tolerating livalo.  TOB - Still smoking, no interest in cessation.  OBESITY - losing weight, eating less cookies.  MED - Compliant with CV meds listed below without notable side effects     COMPLIANCE     Category Meds Diet Salt Exercise Tobacco Alcohol Drugs   Compliant [x] [] [] [] [] [x] [x]   [x]Counseling given on all above above categories    HISTORY/ALLERGIES/ROS     MedHx:  has a past medical history of Albinoidism ([facility] CAD (coronary artery disease), COPD (chronic obstructive pulmonary disease) ([facility] [address] Depression, Diabetes mellitus ([facility] [address] [facility] Diet-controlled diabetes mellitus ([facility] [address] Hearing impaired, Hiatal hernia, Hyperlipidemia, Hypertension, Kidney stone, Malignant melanoma ([address] Osteoarthritis, Otosclerosis, Pneumonia, and Stented coronary artery. SurgHx:  has a past surgical history that includes Coronary angioplasty with stent; Skin cancer excision (<2000); knee surgery; Stapedes surgery (3962-0831); skin biopsy; Colonoscopy; Endoscopy, colon, diagnostic; Coronary artery bypass graft (03/17/15); and leo and bso (cervix removed) (1987). SocHx:   reports that she has been smoking cigarettes. She has a 16.00 pack-year smoking history. She has never used smokeless tobacco. She reports that she does not drink alcohol or use drugs. FamHx: family history includes Cancer in an other family member; Esophageal Cancer in her son; Seizures in her daughter.    Allergies: Pcn [penicillins];  Tetanus toxoids; and Statins   ROS:  [x]Full ROS obtained and negative except as mentioned in HPI     MEDICATIONS      Current Outpatient Medications   Medication Sig Dispense Refill    metoprolol tartrate (LOPRESSOR) 50 MG tablet TAKE 1 TABLET TWICE A  tablet 0    ezetimibe (ZETIA) 10 MG tablet Take 1 tablet by mouth daily 90 tablet 1    LIVALO 4 MG TABS tablet TAKE 1 TABLET NIGHTLY 90 tablet 1    valsartan-hydrochlorothiazide (DIOVAN-HCT) 320-25 MG per tablet TAKE 1 TABLET DAILY 90 tablet 1    tiZANidine (ZANAFLEX) 2 MG capsule Take 1 capsule by mouth 3 times daily as needed for Muscle spasms 30 capsule 1    meclizine (ANTIVERT) 12.5 MG tablet Take 1 tablet by mouth 2 times daily as needed for Dizziness 60 tablet 1    albuterol sulfate HFA (PROAIR HFA) 108 (90 Base) MCG/ACT inhaler Inhale 2 puffs into the lungs every 4 hours as needed for Wheezing or Shortness of Breath (cough, shortness breath or wheezing.) 1 Inhaler 2    allopurinol (ZYLOPRIM) 100 MG tablet Take 2 tablets by mouth daily 180 tablet 1    DULoxetine (CYMBALTA) 60 MG extended release capsule TAKE 1 CAPSULE DAILY FOR ARTHRITIS PAIN AND MOOD 90 capsule 3    benzonatate (TESSALON PERLES) 100 MG capsule Take 1 capsule by mouth 3 times daily as needed for Cough 270 capsule 1    diclofenac sodium 1 % GEL Apply 2-4 g topically 4 times daily as needed for Pain 3 Tube 3    omeprazole (PRILOSEC) 20 MG delayed release capsule TAKE 1 CAPSULE DAILY 90 capsule 2    fluticasone (FLONASE) 50 MCG/ACT nasal spray 1 spray by Nasal route daily 3 Bottle 3    promethazine (PHENERGAN) 25 MG tablet Take 0.5 tablets by mouth every 6 hours as needed for Nausea 60 tablet 1    Multiple Vitamins-Minerals (THERAPEUTIC MULTIVITAMIN-MINERALS) tablet Take 1 tablet by mouth daily      vitamin B-12 (CYANOCOBALAMIN) 1000 MCG tablet Take 2 tablets by mouth daily 30 tablet     aspirin 81 MG tablet Take 81 mg by mouth daily      nitroGLYCERIN

## 2019-07-22 RX ORDER — OMEPRAZOLE 20 MG/1
CAPSULE, DELAYED RELEASE ORAL
Qty: 90 CAPSULE | Refills: 2 | Status: SHIPPED | OUTPATIENT
Start: 2019-07-22 | End: 2020-04-17

## 2019-09-12 ENCOUNTER — OFFICE VISIT (OUTPATIENT)
Dept: INTERNAL MEDICINE CLINIC | Age: 79
End: 2019-09-12
Payer: MEDICARE

## 2019-09-12 VITALS
OXYGEN SATURATION: 97 % | SYSTOLIC BLOOD PRESSURE: 138 MMHG | BODY MASS INDEX: 42.77 KG/M2 | DIASTOLIC BLOOD PRESSURE: 70 MMHG | HEART RATE: 86 BPM | WEIGHT: 219 LBS

## 2019-09-12 DIAGNOSIS — E53.8 B12 DEFICIENCY: ICD-10-CM

## 2019-09-12 DIAGNOSIS — M62.830 SPASM OF LUMBAR PARASPINOUS MUSCLE: ICD-10-CM

## 2019-09-12 DIAGNOSIS — E78.00 HYPERCHOLESTEREMIA: ICD-10-CM

## 2019-09-12 DIAGNOSIS — R10.9 RIGHT FLANK PAIN: ICD-10-CM

## 2019-09-12 DIAGNOSIS — E11.9 DIABETES MELLITUS TYPE 2, DIET-CONTROLLED (HCC): Primary | ICD-10-CM

## 2019-09-12 DIAGNOSIS — R31.9 HEMATURIA, UNSPECIFIED TYPE: ICD-10-CM

## 2019-09-12 DIAGNOSIS — E79.0 HYPERURICEMIA: ICD-10-CM

## 2019-09-12 DIAGNOSIS — I25.10 CORONARY ARTERY DISEASE INVOLVING NATIVE CORONARY ARTERY OF NATIVE HEART WITHOUT ANGINA PECTORIS: ICD-10-CM

## 2019-09-12 DIAGNOSIS — F17.200 TOBACCO DEPENDENCE: ICD-10-CM

## 2019-09-12 DIAGNOSIS — R05.9 COUGH: ICD-10-CM

## 2019-09-12 DIAGNOSIS — J06.9 ACUTE UPPER RESPIRATORY INFECTION: ICD-10-CM

## 2019-09-12 LAB
BILIRUBIN, POC: ABNORMAL
BLOOD URINE, POC: ABNORMAL
CLARITY, POC: ABNORMAL
COLOR, POC: ABNORMAL
GLUCOSE URINE, POC: NEGATIVE
KETONES, POC: NEGATIVE
LEUKOCYTE EST, POC: NEGATIVE
NITRITE, POC: NEGATIVE
PH, POC: 6.5
PROTEIN, POC: ABNORMAL
SPECIFIC GRAVITY, POC: 1.01
UROBILINOGEN, POC: 0.2

## 2019-09-12 PROCEDURE — G8427 DOCREV CUR MEDS BY ELIG CLIN: HCPCS | Performed by: FAMILY MEDICINE

## 2019-09-12 PROCEDURE — 4040F PNEUMOC VAC/ADMIN/RCVD: CPT | Performed by: FAMILY MEDICINE

## 2019-09-12 PROCEDURE — 4004F PT TOBACCO SCREEN RCVD TLK: CPT | Performed by: FAMILY MEDICINE

## 2019-09-12 PROCEDURE — 1123F ACP DISCUSS/DSCN MKR DOCD: CPT | Performed by: FAMILY MEDICINE

## 2019-09-12 PROCEDURE — 99214 OFFICE O/P EST MOD 30 MIN: CPT | Performed by: FAMILY MEDICINE

## 2019-09-12 PROCEDURE — 81002 URINALYSIS NONAUTO W/O SCOPE: CPT | Performed by: FAMILY MEDICINE

## 2019-09-12 PROCEDURE — G8598 ASA/ANTIPLAT THER USED: HCPCS | Performed by: FAMILY MEDICINE

## 2019-09-12 PROCEDURE — G8417 CALC BMI ABV UP PARAM F/U: HCPCS | Performed by: FAMILY MEDICINE

## 2019-09-12 PROCEDURE — G8399 PT W/DXA RESULTS DOCUMENT: HCPCS | Performed by: FAMILY MEDICINE

## 2019-09-12 PROCEDURE — 1090F PRES/ABSN URINE INCON ASSESS: CPT | Performed by: FAMILY MEDICINE

## 2019-09-12 RX ORDER — TIZANIDINE HYDROCHLORIDE 2 MG/1
2 CAPSULE, GELATIN COATED ORAL 3 TIMES DAILY PRN
Qty: 30 CAPSULE | Refills: 1 | Status: SHIPPED | OUTPATIENT
Start: 2019-09-12 | End: 2021-01-25

## 2019-09-12 RX ORDER — BENZONATATE 100 MG/1
100 CAPSULE ORAL 3 TIMES DAILY PRN
Qty: 30 CAPSULE | Refills: 0 | Status: SHIPPED | OUTPATIENT
Start: 2019-09-12 | End: 2019-09-22

## 2019-09-12 RX ORDER — ACETAMINOPHEN 500 MG
1000 TABLET ORAL 3 TIMES DAILY PRN
Qty: 100 TABLET | COMMUNITY
Start: 2019-09-12

## 2019-09-12 ASSESSMENT — ENCOUNTER SYMPTOMS
TROUBLE SWALLOWING: 0
COUGH: 1
CHEST TIGHTNESS: 0
WHEEZING: 0
FACIAL SWELLING: 0
BACK PAIN: 1
SHORTNESS OF BREATH: 0

## 2019-09-12 NOTE — PROGRESS NOTES
erythema.   + PN drip   Eyes: Conjunctivae are normal. Right eye exhibits no discharge. Left eye exhibits no discharge. Right conjunctiva is not injected. Left conjunctiva is not injected. Neck: Phonation normal. Neck supple. No thyromegaly present. Cardiovascular: Normal rate, regular rhythm and normal heart sounds. Pulmonary/Chest: Effort normal and breath sounds normal. No respiratory distress. She has no wheezes. She has no rhonchi. She has no rales. Musculoskeletal:        Lumbar back: She exhibits tenderness. She exhibits no bony tenderness. Tender right low back at waistline. Lymphadenopathy:     She has no cervical adenopathy. Skin: Skin is warm and dry. She is not diaphoretic. No cyanosis. Nails show no clubbing. Vitals reviewed. Wt Readings from Last 3 Encounters:   09/12/19 219 lb (99.3 kg)   07/03/19 225 lb 12.8 oz (102.4 kg)   06/06/19 230 lb 6.4 oz (104.5 kg)     Temp Readings from Last 3 Encounters:   12/08/17 98.3 °F (36.8 °C) (Oral)   12/02/16 98.1 °F (36.7 °C)   05/21/15 98 °F (36.7 °C) (Oral)     BP Readings from Last 3 Encounters:   09/12/19 138/70   07/03/19 112/86   06/06/19 122/60     Pulse Readings from Last 3 Encounters:   09/12/19 86   07/03/19 68   06/06/19 68     Results for POC orders placed in visit on 09/12/19   POCT Urinalysis no Micro   Result Value Ref Range    Color, UA gold     Clarity, UA cloudy     Glucose, UA POC negative     Bilirubin, UA moderate     Ketones, UA negative     Spec Grav, UA 1.015     Blood, UA POC moderate     pH, UA 6.5     Protein, UA POC trace     Urobilinogen, UA 0.2     Leukocytes, UA negative     Nitrite, UA negative        Assessment:      1. Diabetes mellitus type 2, diet-controlled ([address]  Continue weight loss and diet improvements. No med for sugar needed at this time. - Comprehensive Metabolic Panel; Future  - Hemoglobin A1C; Future  - Microalbumin / Creatinine Urine Ratio; Future    2. Right flank pain  R/o UTI but no leuks. R/o kidney stone. Could be muscle pull from her coughing. Check US of right kidney abdomen.    - POCT Urinalysis no Micro  - acetaminophen (APAP EXTRA STRENGTH) 500 MG tablet; Take 2 tablets by mouth 3 times daily as needed for Pain  Dispense: 100 tablet; Refill: prn  - US ABDOMEN LIMITED; Future  - URINE CULTURE    3. Coronary artery disease involving native coronary artery of native heart without angina pectoris  On high dose statin and Zetia. Goal LDL is < 70. Smoking abstinence advised. She is not ready to quit   - Lipid Panel; Future    4. B12 deficiency  Lab Results   Component Value Date    [id number] 679 06/06/2019     Resolved with supplement. 5. Tobacco dependence  She is not ready to quit or work on this. Gentle counseling. 6. Hyperuricemia  Lab Results   Component Value Date    LABURIC 6.2 (H) 06/06/2019   under reasonable control. Get labs done before or at next visit. - Comprehensive Metabolic Panel; Future  - Uric Acid; Future    7. Hypercholesteremia  Lab Results   Component Value Date    LDLCALC 105 (H) 12/13/2018    LDLDIRECT 102 (H) 06/06/2019     On Livalo. Zetia just added. - Comprehensive Metabolic Panel; Future  - Lipid Panel; Future  - CK; Future    8. Spasm of lumbar paraspinous muscle  Prn medicatin. - tiZANidine (ZANAFLEX) 2 MG capsule; Take 1 capsule by mouth 3 times daily as needed (back pain, muscle spasms)  Dispense: 30 capsule; Refill: 1    9. Acute upper respiratory infection  Appears viral.  Call for ABX only IF NOT BETTER --- 9/16/19   2-3 tsp of [facility] Cold and Alllergy every 6 hours. 10. Cough  - benzonatate (TESSALON) 100 MG capsule; Take 1 capsule by mouth 3 times daily as needed for Cough  Dispense: 30 capsule; Refill: 0    11. Hematuria, unspecified type  - URINE CULTURE          Plan:      See orders. See after visit summary, patient instructions, and reference hand-outs. A PRINTED SUMMARY = AVS GIVEN TO THE PATIENT.     Discussed

## 2019-09-15 LAB — URINE CULTURE, ROUTINE: NORMAL

## 2019-09-16 ENCOUNTER — HOSPITAL ENCOUNTER (OUTPATIENT)
Dept: ULTRASOUND IMAGING | Age: 79
Discharge: HOME | End: 2019-09-16
Payer: MEDICARE

## 2019-09-16 ENCOUNTER — HOSPITAL ENCOUNTER (OUTPATIENT)
Age: 79
Discharge: HOME | End: 2019-09-16
Payer: MEDICARE

## 2019-09-16 DIAGNOSIS — I25.10 CORONARY ARTERY DISEASE INVOLVING NATIVE CORONARY ARTERY OF NATIVE HEART WITHOUT ANGINA PECTORIS: ICD-10-CM

## 2019-09-16 DIAGNOSIS — R10.9 RIGHT FLANK PAIN: ICD-10-CM

## 2019-09-16 DIAGNOSIS — E79.0 HYPERURICEMIA: ICD-10-CM

## 2019-09-16 DIAGNOSIS — E78.00 HYPERCHOLESTEREMIA: ICD-10-CM

## 2019-09-16 DIAGNOSIS — E11.9 DIABETES MELLITUS TYPE 2, DIET-CONTROLLED (HCC): ICD-10-CM

## 2019-09-16 LAB
A/G RATIO: 1.3 (ref 1.1–2.2)
ALBUMIN SERPL-MCNC: 4.1 G/DL (ref 3.4–5)
ALP BLD-CCNC: 137 U/L (ref 40–129)
ALT SERPL-CCNC: 18 U/L (ref 10–40)
ANION GAP SERPL CALCULATED.3IONS-SCNC: 17 MMOL/L (ref 3–16)
AST SERPL-CCNC: 17 U/L (ref 15–37)
BILIRUB SERPL-MCNC: 0.5 MG/DL (ref 0–1)
BUN BLDV-MCNC: 27 MG/DL (ref 7–20)
CALCIUM SERPL-MCNC: 10.6 MG/DL (ref 8.3–10.6)
CHLORIDE BLD-SCNC: 101 MMOL/L (ref 99–110)
CHOLESTEROL, TOTAL: 202 MG/DL (ref 0–199)
CO2: 24 MMOL/L (ref 21–32)
CREAT SERPL-MCNC: 1.1 MG/DL (ref 0.6–1.2)
CREATININE URINE: 110.1 MG/DL (ref 28–259)
GFR AFRICAN AMERICAN: 58
GFR NON-AFRICAN AMERICAN: 48
GLOBULIN: 3.2 G/DL
GLUCOSE BLD-MCNC: 109 MG/DL (ref 70–99)
HDLC SERPL-MCNC: 51 MG/DL (ref 40–60)
LDL CHOLESTEROL CALCULATED: 126 MG/DL
MICROALBUMIN UR-MCNC: <1.2 MG/DL
MICROALBUMIN/CREAT UR-RTO: NORMAL MG/G (ref 0–30)
POTASSIUM SERPL-SCNC: 4.2 MMOL/L (ref 3.5–5.1)
SODIUM BLD-SCNC: 142 MMOL/L (ref 136–145)
TOTAL CK: 44 U/L (ref 26–192)
TOTAL PROTEIN: 7.3 G/DL (ref 6.4–8.2)
TRIGL SERPL-MCNC: 126 MG/DL (ref 0–150)
URIC ACID, SERUM: 6.2 MG/DL (ref 2.6–6)
VLDLC SERPL CALC-MCNC: 25 MG/DL

## 2019-09-16 PROCEDURE — 80061 LIPID PANEL: CPT

## 2019-09-16 PROCEDURE — 82550 ASSAY OF CK (CPK): CPT

## 2019-09-16 PROCEDURE — 76705 ECHO EXAM OF ABDOMEN: CPT

## 2019-09-16 PROCEDURE — 84550 ASSAY OF BLOOD/URIC ACID: CPT

## 2019-09-16 PROCEDURE — 36415 COLL VENOUS BLD VENIPUNCTURE: CPT

## 2019-09-16 PROCEDURE — 82570 ASSAY OF URINE CREATININE: CPT

## 2019-09-16 PROCEDURE — 80053 COMPREHEN METABOLIC PANEL: CPT

## 2019-09-16 PROCEDURE — 83036 HEMOGLOBIN GLYCOSYLATED A1C: CPT

## 2019-09-16 PROCEDURE — 82043 UR ALBUMIN QUANTITATIVE: CPT

## 2019-09-16 RX ORDER — METOPROLOL TARTRATE 50 MG/1
TABLET, FILM COATED ORAL
Qty: 180 TABLET | Refills: 4 | Status: SHIPPED | OUTPATIENT
Start: 2019-09-16 | End: 2020-12-10

## 2019-09-17 LAB
ESTIMATED AVERAGE GLUCOSE: 125.5 MG/DL
HBA1C MFR BLD: 6 %

## 2019-09-18 ENCOUNTER — TELEPHONE (OUTPATIENT)
Dept: INTERNAL MEDICINE CLINIC | Age: 79
End: 2019-09-18

## 2019-09-18 DIAGNOSIS — I25.10 CORONARY ARTERY DISEASE INVOLVING NATIVE CORONARY ARTERY OF NATIVE HEART WITHOUT ANGINA PECTORIS: ICD-10-CM

## 2019-09-20 ENCOUNTER — TELEPHONE (OUTPATIENT)
Dept: INTERNAL MEDICINE CLINIC | Age: 79
End: 2019-09-20

## 2019-10-07 RX ORDER — VALSARTAN AND HYDROCHLOROTHIAZIDE 320; 25 MG/1; MG/1
TABLET, FILM COATED ORAL
Qty: 90 TABLET | Refills: 4 | Status: SHIPPED | OUTPATIENT
Start: 2019-10-07 | End: 2020-12-28 | Stop reason: SDUPTHER

## 2019-12-15 DIAGNOSIS — F33.0 MILD EPISODE OF RECURRENT MAJOR DEPRESSIVE DISORDER (HCC): ICD-10-CM

## 2019-12-16 RX ORDER — DULOXETIN HYDROCHLORIDE 60 MG/1
CAPSULE, DELAYED RELEASE ORAL
Qty: 90 CAPSULE | Refills: 3 | Status: SHIPPED | OUTPATIENT
Start: 2019-12-16 | End: 2020-12-09

## 2020-01-10 PROBLEM — I25.10 CORONARY ARTERY DISEASE DUE TO LIPID RICH PLAQUE: Status: ACTIVE | Noted: 2020-01-10

## 2020-01-10 PROBLEM — E66.813 CLASS 3 SEVERE OBESITY DUE TO EXCESS CALORIES WITH BODY MASS INDEX (BMI) OF 40.0 TO 44.9 IN ADULT: Status: ACTIVE | Noted: 2020-01-10

## 2020-01-10 PROBLEM — E66.01 CLASS 3 SEVERE OBESITY DUE TO EXCESS CALORIES WITH BODY MASS INDEX (BMI) OF 40.0 TO 44.9 IN ADULT (HCC): Status: ACTIVE | Noted: 2020-01-10

## 2020-01-10 PROBLEM — I25.83 CORONARY ARTERY DISEASE DUE TO LIPID RICH PLAQUE: Status: ACTIVE | Noted: 2020-01-10

## 2020-01-10 PROBLEM — Z72.0 TOBACCO ABUSE: Status: ACTIVE | Noted: 2020-01-10

## 2020-01-10 NOTE — PROGRESS NOTES
[address]     H+P // CONSULT // OUTPATIENT VISIT // [place]     Referring Doctor Luis Fernando Conrad MD   Encounter Type Followup     CHIEF COMPLAINT     Visit Type Chronic   Symptoms None   Problems CAD s/p CABG, HTN, CHOL, TOB      HISTORY OF PRESENT ILLNESS      GEN - Doing well. No new concerns.  CAD - s/p CABG 3/15. Denies cp, sob, dizziness, syncope, palpitations.  HTN - Ambulatory BP readings in good range. No HA or dizziness.  CHOL - Last cholesterol reviewed and in good range. Tolerating livalo.  TOB - Still smoking, no interest in cessation.  OBESITY - losing weigh. Down 30lbs.  MED - Compliant with CV meds listed below without notable side effects     HISTORY/ALLERGIES/ROS     MedHx:  has a past medical history of [address]ism [address] CAD (coronary artery disease), COPD (chronic obstructive pulmonary disease) ([address] Depression, Diabetes mellitus ([address] Diet-controlled diabetes mellitus ([address] Hearing impaired, Hiatal hernia, Hyperlipidemia, Hypertension, Kidney stone, Malignant melanoma ([address] Osteoarthritis, Otosclerosis, Pneumonia, and Stented coronary artery. SurgHx:  has a past surgical history that includes Coronary angioplasty with stent; Skin cancer excision (<2000); knee surgery; Stapedes surgery ([id number]); skin biopsy; Colonoscopy; Endoscopy, colon, diagnostic; Coronary artery bypass graft (03/17/15); and leo and bso (cervix removed) (1987). SocHx:   reports that she has been smoking cigarettes. She has a 16.00 pack-year smoking history. She has never used smokeless tobacco. She reports that she does not drink alcohol or use drugs. FamHx: family history includes Cancer in an other family member; Esophageal Cancer in her son; Seizures in her daughter. Allergies: Pcn [penicillins];  Tetanus toxoids; and Statins   ROS:  [x]Full ROS obtained and negative except as mentioned in HPI     MEDICATIONS      Current Outpatient Medications   Medication Sig tablet by mouth daily        No current facility-administered medications for this visit. Reviewed with patient and will remain unchanged except as mentioned in A/P  PHYSICAL EXAM     Vitals:    01/13/20 1607   BP: (!) 154/66   Pulse:    SpO2:       Gen Alert, coop, no distress Heart  Rrr, no mrg   Head NC, AT, no abnorm Abd  Soft, NT, +BS, no mass, no OM   Eyes PER, conj/corn clear Ext  Ext nl, AT, no C/C/E   Nose Nares nl, no drain, NT Pulse 2+ and symmetric   Throat Lips, mucosa, tongue nl Skin Col/text/turg nl, no vis rash/les   Neck S/S, TM, NT, no bruit/JVD Psych Nl mood and affect   Lung CTA-B, unlabored, no DTP Lymph   No cervical or axillary LA   Ch wall NT, no deform Neuro  Nl gross M/S exam     ASSESSMENT AND PLAN     ~CAD   Date EF Results   Sx   No concerning   Hx 3/15  CABGx3 LIMA-LAD, SVG-OM2, SVG-RCA Tasha Cleveland)   [facility] 3/15  MVD->CABG   MPI   No recent   TTE 3/15 60%    Plan   Continue aggressive medical treatment at doses above  Stop smoking   ~HTN  Today BP elevated   Counseling Counseled on diet/salt, exercise and ideal body weight   Plan Continue current meds at doses above  Unusually high bp today, continue to observe   ~Hyperchol  Goal LDL <70   Counseling Counseled on diet, exercise and ideal body weight   Plan PCP liver/lipid surveillance, continue current meds at doses above   9/19 HDL:51, LDL:126  ~Tobacco  Status Active smoker. Counseling Counseled on risks, cessation therapies discussed   Plan Continued avoidance of first and second hand smoke   ~Obesity  BMI Body mass index is 42.63 kg/m².     Plan Counseled on diet, exercise, weight loss options in detail   ~Compliance  Discussed importance of compliance with meds, diet, salt, exercise  Discussed importance of avoidance of tobacco, alcohol and drugs  Plan Patient expressed understanding  ~Followup  Interval:  6 months    [address] Sebastián Richards, am scribing for and in the presence of Cris Hawk MD.   Signed, Montrell Muhammad Linda Ibarra 01/10/20 3:08 PM   Provider Romayne Broaden is working as a scribe for and in the presence of me Fuad Correa MD). Working as a scribe, Marcela Frausto may have prepopulated components of this note with my historical  intellectual property under my direct supervision. Any additions to this intellectual property were performed in my presence and at my direction.   Furthermore, the content and accuracy of this note have been reviewed by me Fuad Correa MD).  1/13/2020 4:22 PM

## 2020-01-13 ENCOUNTER — OFFICE VISIT (OUTPATIENT)
Dept: CARDIOLOGY CLINIC | Age: 80
End: 2020-01-13
Payer: MEDICARE

## 2020-01-13 VITALS
WEIGHT: 218.3 LBS | HEART RATE: 67 BPM | HEIGHT: 60 IN | SYSTOLIC BLOOD PRESSURE: 154 MMHG | OXYGEN SATURATION: 95 % | DIASTOLIC BLOOD PRESSURE: 66 MMHG | BODY MASS INDEX: 42.86 KG/M2

## 2020-01-13 PROCEDURE — G8427 DOCREV CUR MEDS BY ELIG CLIN: HCPCS | Performed by: INTERNAL MEDICINE

## 2020-01-13 PROCEDURE — 99214 OFFICE O/P EST MOD 30 MIN: CPT | Performed by: INTERNAL MEDICINE

## 2020-01-13 PROCEDURE — 4040F PNEUMOC VAC/ADMIN/RCVD: CPT | Performed by: INTERNAL MEDICINE

## 2020-01-13 PROCEDURE — 4004F PT TOBACCO SCREEN RCVD TLK: CPT | Performed by: INTERNAL MEDICINE

## 2020-01-13 PROCEDURE — 1090F PRES/ABSN URINE INCON ASSESS: CPT | Performed by: INTERNAL MEDICINE

## 2020-01-13 PROCEDURE — G8417 CALC BMI ABV UP PARAM F/U: HCPCS | Performed by: INTERNAL MEDICINE

## 2020-01-13 PROCEDURE — 1123F ACP DISCUSS/DSCN MKR DOCD: CPT | Performed by: INTERNAL MEDICINE

## 2020-01-13 PROCEDURE — G8399 PT W/DXA RESULTS DOCUMENT: HCPCS | Performed by: INTERNAL MEDICINE

## 2020-01-13 PROCEDURE — G8484 FLU IMMUNIZE NO ADMIN: HCPCS | Performed by: INTERNAL MEDICINE

## 2020-03-04 RX ORDER — FLUTICASONE PROPIONATE 50 MCG
SPRAY, SUSPENSION (ML) NASAL
Qty: 48 G | Refills: 2 | Status: SHIPPED | OUTPATIENT
Start: 2020-03-04 | End: 2021-08-26

## 2020-03-13 ENCOUNTER — OFFICE VISIT (OUTPATIENT)
Dept: INTERNAL MEDICINE CLINIC | Age: 80
End: 2020-03-13
Payer: MEDICARE

## 2020-03-13 VITALS
WEIGHT: 223 LBS | SYSTOLIC BLOOD PRESSURE: 126 MMHG | DIASTOLIC BLOOD PRESSURE: 72 MMHG | BODY MASS INDEX: 43.78 KG/M2 | HEART RATE: 76 BPM | HEIGHT: 60 IN

## 2020-03-13 LAB
A/G RATIO: 1.7 (ref 1.1–2.2)
ALBUMIN SERPL-MCNC: 4.1 G/DL (ref 3.4–5)
ALP BLD-CCNC: 112 U/L (ref 40–129)
ALT SERPL-CCNC: 18 U/L (ref 10–40)
ANION GAP SERPL CALCULATED.3IONS-SCNC: 15 MMOL/L (ref 3–16)
AST SERPL-CCNC: 16 U/L (ref 15–37)
BASOPHILS ABSOLUTE: 0.1 K/UL (ref 0–0.2)
BASOPHILS RELATIVE PERCENT: 0.8 %
BILIRUB SERPL-MCNC: 0.3 MG/DL (ref 0–1)
BILIRUBIN, POC: NORMAL
BLOOD URINE, POC: NORMAL
BUN BLDV-MCNC: 28 MG/DL (ref 7–20)
CALCIUM SERPL-MCNC: 9.6 MG/DL (ref 8.3–10.6)
CHLORIDE BLD-SCNC: 103 MMOL/L (ref 99–110)
CLARITY, POC: CLEAR
CO2: 26 MMOL/L (ref 21–32)
COLOR, POC: YELLOW
CREAT SERPL-MCNC: 1.1 MG/DL (ref 0.6–1.2)
EOSINOPHILS ABSOLUTE: 0.3 K/UL (ref 0–0.6)
EOSINOPHILS RELATIVE PERCENT: 2.6 %
GFR AFRICAN AMERICAN: 58
GFR NON-AFRICAN AMERICAN: 48
GLOBULIN: 2.4 G/DL
GLUCOSE BLD-MCNC: 109 MG/DL (ref 70–99)
GLUCOSE URINE, POC: NORMAL
HCT VFR BLD CALC: 40.6 % (ref 36–48)
HDLC SERPL-MCNC: 43 MG/DL (ref 40–60)
HEMOGLOBIN: 13.4 G/DL (ref 12–16)
KETONES, POC: NORMAL
LDL CHOLESTEROL DIRECT: 97 MG/DL
LEUKOCYTE EST, POC: NORMAL
LYMPHOCYTES ABSOLUTE: 3.4 K/UL (ref 1–5.1)
LYMPHOCYTES RELATIVE PERCENT: 33.2 %
MCH RBC QN AUTO: 29.6 PG (ref 26–34)
MCHC RBC AUTO-ENTMCNC: 32.9 G/DL (ref 31–36)
MCV RBC AUTO: 89.7 FL (ref 80–100)
MONOCYTES ABSOLUTE: 0.9 K/UL (ref 0–1.3)
MONOCYTES RELATIVE PERCENT: 8.7 %
NEUTROPHILS ABSOLUTE: 5.6 K/UL (ref 1.7–7.7)
NEUTROPHILS RELATIVE PERCENT: 54.7 %
NITRITE, POC: NORMAL
PDW BLD-RTO: 14.8 % (ref 12.4–15.4)
PH, POC: 6
PLATELET # BLD: 322 K/UL (ref 135–450)
PMV BLD AUTO: 8.4 FL (ref 5–10.5)
POTASSIUM SERPL-SCNC: 3.8 MMOL/L (ref 3.5–5.1)
PROTEIN, POC: NORMAL
RBC # BLD: 4.53 M/UL (ref 4–5.2)
SODIUM BLD-SCNC: 144 MMOL/L (ref 136–145)
SPECIFIC GRAVITY, POC: 1.01
TOTAL PROTEIN: 6.5 G/DL (ref 6.4–8.2)
URIC ACID, SERUM: 9.8 MG/DL (ref 2.6–6)
UROBILINOGEN, POC: 0.2
WBC # BLD: 10.3 K/UL (ref 4–11)

## 2020-03-13 PROCEDURE — 3023F SPIROM DOC REV: CPT | Performed by: FAMILY MEDICINE

## 2020-03-13 PROCEDURE — 81002 URINALYSIS NONAUTO W/O SCOPE: CPT | Performed by: FAMILY MEDICINE

## 2020-03-13 PROCEDURE — 1090F PRES/ABSN URINE INCON ASSESS: CPT | Performed by: FAMILY MEDICINE

## 2020-03-13 PROCEDURE — G8417 CALC BMI ABV UP PARAM F/U: HCPCS | Performed by: FAMILY MEDICINE

## 2020-03-13 PROCEDURE — 4040F PNEUMOC VAC/ADMIN/RCVD: CPT | Performed by: FAMILY MEDICINE

## 2020-03-13 PROCEDURE — 99214 OFFICE O/P EST MOD 30 MIN: CPT | Performed by: FAMILY MEDICINE

## 2020-03-13 PROCEDURE — G8427 DOCREV CUR MEDS BY ELIG CLIN: HCPCS | Performed by: FAMILY MEDICINE

## 2020-03-13 PROCEDURE — 4004F PT TOBACCO SCREEN RCVD TLK: CPT | Performed by: FAMILY MEDICINE

## 2020-03-13 PROCEDURE — G8926 SPIRO NO PERF OR DOC: HCPCS | Performed by: FAMILY MEDICINE

## 2020-03-13 PROCEDURE — 1123F ACP DISCUSS/DSCN MKR DOCD: CPT | Performed by: FAMILY MEDICINE

## 2020-03-13 PROCEDURE — G8484 FLU IMMUNIZE NO ADMIN: HCPCS | Performed by: FAMILY MEDICINE

## 2020-03-13 PROCEDURE — G8399 PT W/DXA RESULTS DOCUMENT: HCPCS | Performed by: FAMILY MEDICINE

## 2020-03-13 RX ORDER — POLYETHYLENE GLYCOL 3350 17 G
POWDER IN PACKET (EA) ORAL
Qty: 30 EACH | Refills: 3 | Status: SHIPPED | OUTPATIENT
Start: 2020-03-13 | End: 2020-07-09

## 2020-03-13 RX ORDER — TRIAMCINOLONE ACETONIDE 5 MG/G
CREAM TOPICAL
Qty: 30 G | Refills: 3 | Status: SHIPPED | OUTPATIENT
Start: 2020-03-13 | End: 2021-01-25 | Stop reason: SDUPTHER

## 2020-03-13 NOTE — PROGRESS NOTES
Subjective:      Patient ID: Shirin Amos is a 78 y.o. female. HPI   Chief Complaint   Patient presents with    6 Month Follow-Up     FU of IGT vs early DM, CAD, HLP, HTN, albinoidism, GERD, tobacco dependence. Has \"kidney pain\" : Right side up above the hip bone;  Urine is dark and concerned about UTI. No dysuria or blood. Skin spots eczema is active on arms and hands. See Assessment for more detail on conditions addressed today.     Patient Active Problem List   Diagnosis    Impaired glucose metabolism    Hypercholesteremia    Albinoidism ([address]    Coronary artery disease involving native coronary artery of native heart without angina pectoris    Hearing impaired    Vertigo    GERD (gastroesophageal reflux disease)    Intertrigo    Abnormal EKG    Essential hypertension    History of melanoma    Iron deficiency anemia    Tobacco dependence    Chest wall pain following surgery    Sacroiliac pain    Chronic rhinitis    B12 deficiency    Blepharitis of lower eyelids of both eyes    COPD, mild ([address]    Morbid obesity ([address]    Recurrent major depression in remission ([address]    Chronic use of benzodiazepine for therapeutic purpose    Acute bilateral low back pain without sciatica    Spasm of lumbar paraspinous muscle    Ganglion cyst of volar aspect of left wrist    Chronic kidney disease, stage III (moderate) (HCC)    Hyperuricemia    Diabetes mellitus type 2, diet-controlled (HCC)    Tobacco abuse    Class 3 severe obesity due to excess calories with body mass index (BMI) of 40.0 to 44.9 in [facility])    Coronary artery disease due to lipid rich plaque         Outpatient Medications Marked as Taking for the 3/13/20 encounter (Office Visit) with Liz Figueroa MD   Medication Sig Dispense Refill    fluticasone (FLONASE) 50 MCG/ACT nasal spray USE 1 SPRAY NASALLY DAILY 48 g 2    DULoxetine (CYMBALTA) 60 MG extended release capsule TAKE 1 CAPSULE DAILY FOR ARTHRITIS PAIN Constitutional:       General: She is not in acute distress. Appearance: Normal appearance. She is well-developed. She is not diaphoretic. Eyes:      General: No scleral icterus. Neck:      Musculoskeletal: Neck supple. Thyroid: No thyroid mass or thyromegaly. Vascular: No carotid bruit. Cardiovascular:      Rate and Rhythm: Normal rate and regular rhythm. Heart sounds: Normal heart sounds, S1 normal and S2 normal. No murmur. Pulmonary:      Effort: Pulmonary effort is normal. No respiratory distress. Breath sounds: Normal breath sounds. No decreased breath sounds, wheezing, rhonchi or rales. Abdominal:      General: Bowel sounds are normal. There is no distension or abdominal bruit. Palpations: Abdomen is soft. There is no hepatomegaly or mass. Tenderness: There is no abdominal tenderness. There is no guarding or rebound. Hernia: No hernia is present. Musculoskeletal:      Right lower leg: No edema. Left lower leg: No edema. Lymphadenopathy:      Cervical: No cervical adenopathy. Skin:     General: Skin is warm and dry. Coloration: Skin is pale (c/w albino status). Findings: Rash present. Rash is scaling. Nails: There is no clubbing. Comments: A few scaly coin lesions on extensor forearms. Neurological:      Mental Status: She is alert and oriented to person, place, and time. Motor: No tremor or abnormal muscle tone.       Coordination: Coordination normal.      Gait: Gait normal.   Psychiatric:         Speech: Speech normal.         Behavior: Behavior normal.          Lab Results   Component Value Date    LABA1C 6.0 09/16/2019     Lab Results   Component Value Date    .5 09/16/2019        Lab Results   Component Value Date    LDLCALC 126 (H) 09/16/2019         BP Readings from Last 3 Encounters:   03/13/20 126/72   01/13/20 (!) 154/66   09/12/19 138/70       Wt Readings from Last 3 Encounters:   03/13/20 223 lb (101.2 kg)   01/13/20 218 lb 4.8 oz (99 kg)   09/12/19 219 lb (99.3 kg)     Body mass index is 43.55 kg/m². Assessment:      1. Right flank pain  R/o UTI but suspect MS pain. - POCT Urinalysis no Micro    Results for POC orders placed in visit on 03/13/20   POCT Urinalysis no Micro   Result Value Ref Range    Color, UA yellow     Clarity, UA clear     Glucose, UA POC neg     Bilirubin, UA neg     Ketones, UA neg     Spec Grav, UA 1.015     Blood, UA POC neg     pH, UA 6.0     Protein, UA POC neg     Urobilinogen, UA 0.2     Leukocytes, UA neg     Nitrite, UA neg      No evidence of UTI. 2. Other eczema  dwp otc creams, soaps and humidifier. Prn steroid cream   - triamcinolone (ARISTOCORT) 0.5 % cream; Apply topically 2 times daily. Dispense: 30 g; Refill: 3    3. Diabetes mellitus type 2, diet-controlled ([facility]  Lab Results   Component Value Date    LABA1C 6.0 09/16/2019     Stable. 4. Chronic kidney disease, stage III (moderate) (HCC)  Also CMP  - CBC Auto Differential    5. COPD, mild ([address]  Encouraged her to consider smoking cessation. 6. Albinoidism ([address]      7. Morbid obesity ([address]  Weight loss advised. Healthy diet with smaller portions. 8. Hypercholesteremia  Not fasting today. - Comprehensive Metabolic Panel  - HDL Cholesterol  - LDL Cholesterol, Direct    9. Hyperuricemia  - Uric Acid    10. Coronary artery disease due to lipid rich plaque  On statin but not sure of compliance due to uncontrolled LDL  - Comprehensive Metabolic Panel  - HDL Cholesterol  - LDL Cholesterol, Direct    11. Tobacco dependence  Her daughter Osmar Conteh is working on smoking cessation but because [facility] smokes, Osmar Conteh cannot resist and smokes also. .  dwp to avoid smoking around her daughter and use lozenge instead if she craves. - nicotine polacrilex (NICORETTE) 2 MG lozenge; Use 1 Lozg as needed when craving a cigarette and unable to smoke. Dispense: 30 each; Refill: 3          Plan:      See orders.   See after visit summary, patient instructions, and reference hand-outs. A PRINTED SUMMARY = AVS GIVEN TO THE PATIENT. Discussed use, benefit, and side effects of prescribed medications. Barriers to medication compliance addressed. All patient questions answered.           Jeana Monroe MD

## 2020-03-15 ASSESSMENT — ENCOUNTER SYMPTOMS
NAUSEA: 0
WHEEZING: 0
SHORTNESS OF BREATH: 0
DIARRHEA: 0
CHEST TIGHTNESS: 0
BACK PAIN: 1
COUGH: 0
VOMITING: 0

## 2020-03-17 RX ORDER — ALLOPURINOL 100 MG/1
200 TABLET ORAL DAILY
Qty: 180 TABLET | Refills: 3 | Status: SHIPPED | OUTPATIENT
Start: 2020-03-17 | End: 2021-01-25

## 2020-04-17 RX ORDER — OMEPRAZOLE 20 MG/1
CAPSULE, DELAYED RELEASE ORAL
Qty: 90 CAPSULE | Refills: 3 | Status: SHIPPED | OUTPATIENT
Start: 2020-04-17 | End: 2021-01-25 | Stop reason: SDUPTHER

## 2020-06-15 RX ORDER — PITAVASTATIN CALCIUM 4.18 MG/1
TABLET, FILM COATED ORAL
Qty: 90 TABLET | Refills: 3 | Status: SHIPPED | OUTPATIENT
Start: 2020-06-15 | End: 2020-12-28 | Stop reason: SDUPTHER

## 2020-06-25 ENCOUNTER — OFFICE VISIT (OUTPATIENT)
Dept: INTERNAL MEDICINE CLINIC | Age: 80
End: 2020-06-25
Payer: MEDICARE

## 2020-06-25 VITALS
BODY MASS INDEX: 42.8 KG/M2 | HEART RATE: 64 BPM | DIASTOLIC BLOOD PRESSURE: 70 MMHG | HEIGHT: 60 IN | WEIGHT: 218 LBS | SYSTOLIC BLOOD PRESSURE: 110 MMHG

## 2020-06-25 PROCEDURE — 1123F ACP DISCUSS/DSCN MKR DOCD: CPT | Performed by: FAMILY MEDICINE

## 2020-06-25 PROCEDURE — G0438 PPPS, INITIAL VISIT: HCPCS | Performed by: FAMILY MEDICINE

## 2020-06-25 PROCEDURE — 4040F PNEUMOC VAC/ADMIN/RCVD: CPT | Performed by: FAMILY MEDICINE

## 2020-06-25 ASSESSMENT — LIFESTYLE VARIABLES: HOW OFTEN DO YOU HAVE A DRINK CONTAINING ALCOHOL: 0

## 2020-06-25 ASSESSMENT — PATIENT HEALTH QUESTIONNAIRE - PHQ9
SUM OF ALL RESPONSES TO PHQ QUESTIONS 1-9: 2
SUM OF ALL RESPONSES TO PHQ QUESTIONS 1-9: 2

## 2020-06-25 NOTE — PROGRESS NOTES
Medicare Annual Wellness Visit  Name: Jacqueline Montenegro Date: 2020   MRN: [id number] Sex: Female   Age: 78 y.o. Ethnicity: Non-/Non    : 1940 Race: Romina Jack is here for Medicare AWV    Screenings for behavioral, psychosocial and functional/safety risks, and cognitive dysfunction are all negative except as indicated below. These results, as well as other patient data from the [address] [facility] form, are documented in Flowsheets linked to this Encounter. Allergies   Allergen Reactions    Pcn [Penicillins] Hives    Tetanus Toxoids Itching    Statins Rash     Rash with Lipitor, Crestor, Zocor;  Probably pravastatin. Prior to Visit Medications    Medication Sig Taking?  Authorizing Provider   pitavastatin (LIVALO) 4 MG TABS tablet TAKE 1 TABLET NIGHTLY Yes Parveen Moon MD   omeprazole (PRILOSEC) 20 MG delayed release capsule TAKE 1 CAPSULE DAILY Yes Parveen Moon MD   allopurinol (ZYLOPRIM) 100 MG tablet Take 2 tablets by mouth daily Yes Parveen Moon MD   fluticasone (FLONASE) 50 MCG/ACT nasal spray USE 1 SPRAY NASALLY DAILY Yes Parveen Moon MD   DULoxetine (CYMBALTA) 60 MG extended release capsule TAKE 1 CAPSULE DAILY FOR ARTHRITIS PAIN AND MOOD Yes Parveen Moon MD   valsartan-hydrochlorothiazide (DIOVAN-HCT) 320-25 MG per tablet TAKE 1 TABLET DAILY Yes Parveen Moon MD   metoprolol tartrate (LOPRESSOR) 50 MG tablet TAKE 1 TABLET TWICE A DAY Yes Judy Carias MD   tiZANidine (ZANAFLEX) 2 MG capsule Take 1 capsule by mouth 3 times daily as needed (back pain, muscle spasms) Yes Parveen Moon MD   acetaminophen (APAP EXTRA STRENGTH) 500 MG tablet Take 2 tablets by mouth 3 times daily as needed for Pain Yes Parveen Moon MD   ezetimibe (ZETIA) 10 MG tablet Take 1 tablet by mouth daily Yes Parveen Moon MD   Multiple Vitamins-Minerals (THERAPEUTIC MULTIVITAMIN-MINERALS) tablet Take 1 tablet by mouth daily

## 2020-07-01 ENCOUNTER — CARE COORDINATION (OUTPATIENT)
Dept: CARE COORDINATION | Age: 80
End: 2020-07-01

## 2020-07-06 NOTE — PROGRESS NOTES
[address]     H+P // CONSULT // OUTPATIENT VISIT // Fadi Ybarra     Referring Doctor Lauryn Ibrahim MD   Encounter Type Followup     CHIEF COMPLAINT     Visit Type Chronic   Symptoms None   Problems CAD s/p CABG, HTN, CHOL, TOB      HISTORY OF PRESENT ILLNESS      GEN - Doing well. No new concerns.  CAD - s/p CABG 3/15. Denies cp, sob, dizziness, syncope, palpitations.  HTN - Ambulatory BP readings in good range. No HA or dizziness.  CHOL - Last cholesterol reviewed and in good range. Tolerating livalo.  TOB - Still smoking, no interest in cessation.  OBESITY - continued difficulty with diet and exercise.  MED - Compliant with CV meds listed below without notable side effects     HISTORY/ALLERGIES/ROS     MedHx:  has a past medical history of Albinoidism ([facility] CAD (coronary artery disease), COPD (chronic obstructive pulmonary disease) ([facility] Depression, Diabetes mellitus ([facility] Diet-controlled diabetes mellitus ([facility] Hearing impaired, Hiatal hernia, Hyperlipidemia, Hypertension, Kidney stone, Malignant melanoma ([address] Osteoarthritis, Otosclerosis, Pneumonia, and Stented coronary artery. SurgHx:  has a past surgical history that includes Coronary angioplasty with stent; Skin cancer excision (<2000); knee surgery; Stapedes surgery (2101-2760); skin biopsy; Colonoscopy; Endoscopy, colon, diagnostic; Coronary artery bypass graft (03/17/15); and leo and bso (cervix removed) (1987). SocHx:   reports that she has been smoking cigarettes. She has a 60.00 pack-year smoking history. She has never used smokeless tobacco. She reports that she does not drink alcohol or use drugs. FamHx: family history includes Cancer in an other family member; Esophageal Cancer in her son; Seizures in her daughter. Allergies: Pcn [penicillins];  Tetanus toxoids; and Statins   ROS:  [x]Full ROS obtained and negative except as mentioned in HPI     MEDICATIONS      Current Outpatient Medications Medication Sig Dispense Refill    pitavastatin (LIVALO) 4 MG TABS tablet TAKE 1 TABLET NIGHTLY 90 tablet 3    omeprazole (PRILOSEC) 20 MG delayed release capsule TAKE 1 CAPSULE DAILY 90 capsule 3    allopurinol (ZYLOPRIM) 100 MG tablet Take 2 tablets by mouth daily 180 tablet 3    nicotine polacrilex (NICORETTE) 2 MG lozenge Use 1 Lozg as needed when craving a cigarette and unable to smoke.  (Patient not taking: Reported on 6/25/2020) 30 each 3    fluticasone (FLONASE) 50 MCG/ACT nasal spray USE 1 SPRAY NASALLY DAILY 48 g 2    DULoxetine (CYMBALTA) 60 MG extended release capsule TAKE 1 CAPSULE DAILY FOR ARTHRITIS PAIN AND MOOD 90 capsule 3    valsartan-hydrochlorothiazide (DIOVAN-HCT) 320-25 MG per tablet TAKE 1 TABLET DAILY 90 tablet 4    metoprolol tartrate (LOPRESSOR) 50 MG tablet TAKE 1 TABLET TWICE A  tablet 4    tiZANidine (ZANAFLEX) 2 MG capsule Take 1 capsule by mouth 3 times daily as needed (back pain, muscle spasms) 30 capsule 1    acetaminophen (APAP EXTRA STRENGTH) 500 MG tablet Take 2 tablets by mouth 3 times daily as needed for Pain 100 tablet prn    ezetimibe (ZETIA) 10 MG tablet Take 1 tablet by mouth daily 90 tablet 1    albuterol sulfate HFA (PROAIR HFA) 108 (90 Base) MCG/ACT inhaler Inhale 2 puffs into the lungs every 4 hours as needed for Wheezing or Shortness of Breath (cough, shortness breath or wheezing.) (Patient not taking: Reported on 6/25/2020) 1 Inhaler 2    promethazine (PHENERGAN) 25 MG tablet Take 0.5 tablets by mouth every 6 hours as needed for Nausea (Patient not taking: Reported on 6/25/2020) 60 tablet 1    Multiple Vitamins-Minerals (THERAPEUTIC MULTIVITAMIN-MINERALS) tablet Take 1 tablet by mouth daily      vitamin B-12 (CYANOCOBALAMIN) 1000 MCG tablet Take 2 tablets by mouth daily 30 tablet     aspirin 81 MG tablet Take 81 mg by mouth daily      nitroGLYCERIN (NITROSTAT) 0.4 MG SL tablet Place 1 tablet under the tongue every 5 minutes as needed for Chest pain (Patient not taking: Reported on 6/25/2020) 25 tablet 0    Omega-3 Krill Oil 300 MG CAPS Take 1 tablet by mouth daily        No current facility-administered medications for this visit. Reviewed with patient and will remain unchanged except as mentioned in A/P  PHYSICAL EXAM     Vitals:    07/09/20 1545   BP: 130/68   Pulse: 72   Temp: 97.6 °F (36.4 °C)      Gen Alert, coop, no distress Heart  Rrr, no mrg   Head NC, AT, no abnorm Abd  Soft, NT, +BS, no mass, no OM   Eyes PER, conj/corn clear Ext  Ext nl, AT, no C/C/E   Nose Nares nl, no drain, NT Pulse 2+ and symmetric   Throat Lips, mucosa, tongue nl Skin Col/text/turg nl, no vis rash/les   Neck S/S, TM, NT, no bruit/JVD Psych Nl mood and affect   Lung CTA-B, unlabored, no DTP Lymph   No cervical or axillary LA   Ch wall NT, no deform Neuro  Nl gross M/S exam     ASSESSMENT AND PLAN     *CAD   Date EF Results   Sx   No concerning   Hx 3/15  CABGx3 LIMA-LAD, SVG-OM2, SVG-RCA [place])   LHC 3/15  MVD->CABG   MPI   No recent   TTE 3/15 60%    Plan   Continue aggressive medical treatment at doses above  Stop smoking   *HTN  Status Controlled  Plan Counseled on diet/salt/exercise/weight, continue meds at doses above  *CHOL  Status  Uncontrolled with last LDL of 97 (goal <70) and HDL of 43 (3/20)  Plan Counseled on diet/exercise/weight, continue statin, lipid/liver surveillance per PCP  *TOB  Status Active smoker  Plan Continued avoidance of 1st and 2nd hand smoke, counseled on risks/cessation  *OBESITY  Status Uncontrolled with a BMI of There is no height or weight on file to calculate BMI.   Plan Counseled on diet, exercise, weight loss options in detail  *COMPLIANCE  Status Compliant  Plan Discussed importance of compliance with meds/diet/salt/exercise; avoid tob/alc/drugs; patient verbalized understanding  *FOLLOWUP  12 months      [address] Anita Richards, am scribing for and in the presence of Chinyere Rand MD.   Anita Muñoz 07/06/20 1:12 PM   Provider Mary Plasencia is working as a scribe for and in the presence of me Liz Sahu MD). Working as a scribe, Whitney Andrew may have prepopulated components of this note with my historical  intellectual property under my direct supervision. Any additions to this intellectual property were performed in my presence and at my direction.   Furthermore, the content and accuracy of this note have been reviewed by winter Sahu MD).  7/9/2020 4:01 PM

## 2020-07-09 ENCOUNTER — OFFICE VISIT (OUTPATIENT)
Dept: CARDIOLOGY CLINIC | Age: 80
End: 2020-07-09
Payer: MEDICARE

## 2020-07-09 VITALS
WEIGHT: 223 LBS | DIASTOLIC BLOOD PRESSURE: 68 MMHG | HEIGHT: 60 IN | TEMPERATURE: 97.6 F | SYSTOLIC BLOOD PRESSURE: 130 MMHG | HEART RATE: 72 BPM | BODY MASS INDEX: 43.78 KG/M2

## 2020-07-09 PROCEDURE — G8427 DOCREV CUR MEDS BY ELIG CLIN: HCPCS | Performed by: INTERNAL MEDICINE

## 2020-07-09 PROCEDURE — G8399 PT W/DXA RESULTS DOCUMENT: HCPCS | Performed by: INTERNAL MEDICINE

## 2020-07-09 PROCEDURE — 4040F PNEUMOC VAC/ADMIN/RCVD: CPT | Performed by: INTERNAL MEDICINE

## 2020-07-09 PROCEDURE — G8417 CALC BMI ABV UP PARAM F/U: HCPCS | Performed by: INTERNAL MEDICINE

## 2020-07-09 PROCEDURE — 4004F PT TOBACCO SCREEN RCVD TLK: CPT | Performed by: INTERNAL MEDICINE

## 2020-07-09 PROCEDURE — 99214 OFFICE O/P EST MOD 30 MIN: CPT | Performed by: INTERNAL MEDICINE

## 2020-07-09 PROCEDURE — 1090F PRES/ABSN URINE INCON ASSESS: CPT | Performed by: INTERNAL MEDICINE

## 2020-07-09 PROCEDURE — 1123F ACP DISCUSS/DSCN MKR DOCD: CPT | Performed by: INTERNAL MEDICINE

## 2020-07-09 NOTE — LETTER
pack-year smoking history. She has never used smokeless tobacco. She reports that she does not drink alcohol or use drugs. FamHx: family history includes Cancer in an other family member; Esophageal Cancer in her son; Seizures in her daughter. Allergies: Pcn [penicillins]; Tetanus toxoids; and Statins   ROS:  [x]Full ROS obtained and negative except as mentioned in HPI     MEDICATIONS      Current Outpatient Medications   Medication Sig Dispense Refill    pitavastatin (LIVALO) 4 MG TABS tablet TAKE 1 TABLET NIGHTLY 90 tablet 3    omeprazole (PRILOSEC) 20 MG delayed release capsule TAKE 1 CAPSULE DAILY 90 capsule 3    allopurinol (ZYLOPRIM) 100 MG tablet Take 2 tablets by mouth daily 180 tablet 3    nicotine polacrilex (NICORETTE) 2 MG lozenge Use 1 Lozg as needed when craving a cigarette and unable to smoke.  (Patient not taking: Reported on 6/25/2020) 30 each 3    fluticasone (FLONASE) 50 MCG/ACT nasal spray USE 1 SPRAY NASALLY DAILY 48 g 2    DULoxetine (CYMBALTA) 60 MG extended release capsule TAKE 1 CAPSULE DAILY FOR ARTHRITIS PAIN AND MOOD 90 capsule 3    valsartan-hydrochlorothiazide (DIOVAN-HCT) 320-25 MG per tablet TAKE 1 TABLET DAILY 90 tablet 4    metoprolol tartrate (LOPRESSOR) 50 MG tablet TAKE 1 TABLET TWICE A  tablet 4    tiZANidine (ZANAFLEX) 2 MG capsule Take 1 capsule by mouth 3 times daily as needed (back pain, muscle spasms) 30 capsule 1    acetaminophen (APAP EXTRA STRENGTH) 500 MG tablet Take 2 tablets by mouth 3 times daily as needed for Pain 100 tablet prn    ezetimibe (ZETIA) 10 MG tablet Take 1 tablet by mouth daily 90 tablet 1    albuterol sulfate HFA (PROAIR HFA) 108 (90 Base) MCG/ACT inhaler Inhale 2 puffs into the lungs every 4 hours as needed for Wheezing or Shortness of Breath (cough, shortness breath or wheezing.) (Patient not taking: Reported on 6/25/2020) 1 Inhaler 2    promethazine (PHENERGAN) 25 MG tablet Take 0.5 tablets by mouth every 6 hours as needed for Nausea (Patient not taking: Reported on 6/25/2020) 60 tablet 1    Multiple Vitamins-Minerals (THERAPEUTIC MULTIVITAMIN-MINERALS) tablet Take 1 tablet by mouth daily      vitamin B-12 (CYANOCOBALAMIN) 1000 MCG tablet Take 2 tablets by mouth daily 30 tablet     aspirin 81 MG tablet Take 81 mg by mouth daily      nitroGLYCERIN (NITROSTAT) 0.4 MG SL tablet Place 1 tablet under the tongue every 5 minutes as needed for Chest pain (Patient not taking: Reported on 6/25/2020) 25 tablet 0    Omega-3 Krill Oil 300 MG CAPS Take 1 tablet by mouth daily        No current facility-administered medications for this visit.       Reviewed with patient and will remain unchanged except as mentioned in A/P  PHYSICAL EXAM     Vitals:    07/09/20 1545   BP: 130/68   Pulse: 72   Temp: 97.6 °F (36.4 °C)      Gen Alert, coop, no distress Heart  Rrr, no mrg   Head NC, AT, no abnorm Abd  Soft, NT, +BS, no mass, no OM   Eyes PER, conj/corn clear Ext  Ext nl, AT, no C/C/E   Nose Nares nl, no drain, NT Pulse 2+ and symmetric   Throat Lips, mucosa, tongue nl Skin Col/text/turg nl, no vis rash/les   Neck S/S, TM, NT, no bruit/JVD Psych Nl mood and affect   Lung CTA-B, unlabored, no DTP Lymph   No cervical or axillary LA   Ch wall NT, no deform Neuro  Nl gross M/S exam     ASSESSMENT AND PLAN     *CAD   Date EF Results   Sx   No concerning   Hx 3/15  CABGx3 LIMA-LAD, SVG-OM2, SVG-RCA [place] [place])   C 3/15  MVD->CABG   MPI   No recent   TTE 3/15 60%    Plan   Continue aggressive medical treatment at doses above  Stop smoking   *HTN  Status Controlled  Plan Counseled on diet/salt/exercise/weight, continue meds at doses above  *CHOL  Status  Uncontrolled with last LDL of 97 (goal <70) and HDL of 43 (3/20)  Plan Counseled on diet/exercise/weight, continue statin, lipid/liver surveillance per PCP  *TOB  Status Active smoker  Plan Continued avoidance of 1st and 2nd hand smoke, counseled on risks/cessation  *OBESITY Status Uncontrolled with a BMI of There is no height or weight on file to calculate BMI. Plan Counseled on diet, exercise, weight loss options in detail  *COMPLIANCE  Status Compliant  Plan Discussed importance of compliance with meds/diet/salt/exercise; avoid tob/alc/drugs; patient verbalized understanding  *FOLLOWUP  12 months      [address] Boo Richards, am scribing for and in the presence of Radha Sousa MD.   SignedBoo 07/06/20 1:12 PM   Provider Michelle Isabel is working as a scribe for and in the presence of me Radha Sousa MD). Working as a scribe, Boo Ryan may have prepopulated components of this note with my historical  intellectual property under my direct supervision. Any additions to this intellectual property were performed in my presence and at my direction. Furthermore, the content and accuracy of this note have been reviewed by me Radha Sousa MD).  7/9/2020 4:01 PM          If you have questions, please do not hesitate to call me. I look forward to following Michael Figueredo along with you.     Sincerely,        Kamille Garrett MD

## 2020-09-25 ENCOUNTER — OFFICE VISIT (OUTPATIENT)
Dept: INTERNAL MEDICINE CLINIC | Age: 80
End: 2020-09-25
Payer: MEDICARE

## 2020-09-25 VITALS
WEIGHT: 225 LBS | SYSTOLIC BLOOD PRESSURE: 133 MMHG | TEMPERATURE: 96.8 F | HEART RATE: 88 BPM | HEIGHT: 60 IN | DIASTOLIC BLOOD PRESSURE: 78 MMHG | BODY MASS INDEX: 44.17 KG/M2

## 2020-09-25 LAB
A/G RATIO: 1.9 (ref 1.1–2.2)
ALBUMIN SERPL-MCNC: 4.4 G/DL (ref 3.4–5)
ALP BLD-CCNC: 134 U/L (ref 40–129)
ALT SERPL-CCNC: 15 U/L (ref 10–40)
ANION GAP SERPL CALCULATED.3IONS-SCNC: 14 MMOL/L (ref 3–16)
AST SERPL-CCNC: 17 U/L (ref 15–37)
BASOPHILS ABSOLUTE: 0.1 K/UL (ref 0–0.2)
BASOPHILS RELATIVE PERCENT: 0.6 %
BILIRUB SERPL-MCNC: 0.4 MG/DL (ref 0–1)
BUN BLDV-MCNC: 29 MG/DL (ref 7–20)
CALCIUM SERPL-MCNC: 10.2 MG/DL (ref 8.3–10.6)
CHLORIDE BLD-SCNC: 100 MMOL/L (ref 99–110)
CHOLESTEROL, TOTAL: 182 MG/DL (ref 0–199)
CO2: 25 MMOL/L (ref 21–32)
CREAT SERPL-MCNC: 1.2 MG/DL (ref 0.6–1.2)
EOSINOPHILS ABSOLUTE: 0.4 K/UL (ref 0–0.6)
EOSINOPHILS RELATIVE PERCENT: 3 %
GFR AFRICAN AMERICAN: 52
GFR NON-AFRICAN AMERICAN: 43
GLOBULIN: 2.3 G/DL
GLUCOSE BLD-MCNC: 99 MG/DL (ref 70–99)
HCT VFR BLD CALC: 41.1 % (ref 36–48)
HDLC SERPL-MCNC: 47 MG/DL (ref 40–60)
HEMOGLOBIN: 13.7 G/DL (ref 12–16)
LDL CHOLESTEROL CALCULATED: 100 MG/DL
LYMPHOCYTES ABSOLUTE: 3.8 K/UL (ref 1–5.1)
LYMPHOCYTES RELATIVE PERCENT: 29.3 %
MAGNESIUM: 2.1 MG/DL (ref 1.8–2.4)
MCH RBC QN AUTO: 29.1 PG (ref 26–34)
MCHC RBC AUTO-ENTMCNC: 33.3 G/DL (ref 31–36)
MCV RBC AUTO: 87.5 FL (ref 80–100)
MONOCYTES ABSOLUTE: 0.9 K/UL (ref 0–1.3)
MONOCYTES RELATIVE PERCENT: 7.3 %
NEUTROPHILS ABSOLUTE: 7.7 K/UL (ref 1.7–7.7)
NEUTROPHILS RELATIVE PERCENT: 59.8 %
PDW BLD-RTO: 14.8 % (ref 12.4–15.4)
PLATELET # BLD: 338 K/UL (ref 135–450)
PMV BLD AUTO: 8.3 FL (ref 5–10.5)
POTASSIUM SERPL-SCNC: 4 MMOL/L (ref 3.5–5.1)
RBC # BLD: 4.69 M/UL (ref 4–5.2)
SODIUM BLD-SCNC: 139 MMOL/L (ref 136–145)
TOTAL PROTEIN: 6.7 G/DL (ref 6.4–8.2)
TRIGL SERPL-MCNC: 174 MG/DL (ref 0–150)
TSH REFLEX: 3.41 UIU/ML (ref 0.27–4.2)
URIC ACID, SERUM: 9.9 MG/DL (ref 2.6–6)
VITAMIN B-12: >2000 PG/ML (ref 211–911)
VLDLC SERPL CALC-MCNC: 35 MG/DL
WBC # BLD: 12.9 K/UL (ref 4–11)

## 2020-09-25 PROCEDURE — 4004F PT TOBACCO SCREEN RCVD TLK: CPT | Performed by: FAMILY MEDICINE

## 2020-09-25 PROCEDURE — 90694 VACC AIIV4 NO PRSRV 0.5ML IM: CPT | Performed by: FAMILY MEDICINE

## 2020-09-25 PROCEDURE — G8926 SPIRO NO PERF OR DOC: HCPCS | Performed by: FAMILY MEDICINE

## 2020-09-25 PROCEDURE — G8417 CALC BMI ABV UP PARAM F/U: HCPCS | Performed by: FAMILY MEDICINE

## 2020-09-25 PROCEDURE — 1123F ACP DISCUSS/DSCN MKR DOCD: CPT | Performed by: FAMILY MEDICINE

## 2020-09-25 PROCEDURE — G0008 ADMIN INFLUENZA VIRUS VAC: HCPCS | Performed by: FAMILY MEDICINE

## 2020-09-25 PROCEDURE — 4040F PNEUMOC VAC/ADMIN/RCVD: CPT | Performed by: FAMILY MEDICINE

## 2020-09-25 PROCEDURE — 99214 OFFICE O/P EST MOD 30 MIN: CPT | Performed by: FAMILY MEDICINE

## 2020-09-25 PROCEDURE — 1090F PRES/ABSN URINE INCON ASSESS: CPT | Performed by: FAMILY MEDICINE

## 2020-09-25 PROCEDURE — 3023F SPIROM DOC REV: CPT | Performed by: FAMILY MEDICINE

## 2020-09-25 PROCEDURE — G8427 DOCREV CUR MEDS BY ELIG CLIN: HCPCS | Performed by: FAMILY MEDICINE

## 2020-09-25 PROCEDURE — G8399 PT W/DXA RESULTS DOCUMENT: HCPCS | Performed by: FAMILY MEDICINE

## 2020-09-25 RX ORDER — MECLIZINE HCL 12.5 MG/1
12.5-25 TABLET ORAL DAILY PRN
Qty: 30 TABLET | Refills: 2 | Status: SHIPPED | OUTPATIENT
Start: 2020-09-25 | End: 2021-01-25 | Stop reason: SDUPTHER

## 2020-09-25 ASSESSMENT — ENCOUNTER SYMPTOMS
CHEST TIGHTNESS: 0
SHORTNESS OF BREATH: 0
COUGH: 0
WHEEZING: 0

## 2020-09-25 NOTE — PROGRESS NOTES
1 TABLET NIGHTLY 90 tablet 3    omeprazole (PRILOSEC) 20 MG delayed release capsule TAKE 1 CAPSULE DAILY 90 capsule 3    allopurinol (ZYLOPRIM) 100 MG tablet Take 2 tablets by mouth daily 180 tablet 3    fluticasone (FLONASE) 50 MCG/ACT nasal spray USE 1 SPRAY NASALLY DAILY 48 g 2    DULoxetine (CYMBALTA) 60 MG extended release capsule TAKE 1 CAPSULE DAILY FOR ARTHRITIS PAIN AND MOOD 90 capsule 3    valsartan-hydrochlorothiazide (DIOVAN-HCT) 320-25 MG per tablet TAKE 1 TABLET DAILY 90 tablet 4    metoprolol tartrate (LOPRESSOR) 50 MG tablet TAKE 1 TABLET TWICE A  tablet 4    tiZANidine (ZANAFLEX) 2 MG capsule Take 1 capsule by mouth 3 times daily as needed (back pain, muscle spasms) 30 capsule 1    acetaminophen (APAP EXTRA STRENGTH) 500 MG tablet Take 2 tablets by mouth 3 times daily as needed for Pain 100 tablet prn    ezetimibe (ZETIA) 10 MG tablet Take 1 tablet by mouth daily 90 tablet 1    albuterol sulfate HFA (PROAIR HFA) 108 (90 Base) MCG/ACT inhaler Inhale 2 puffs into the lungs every 4 hours as needed for Wheezing or Shortness of Breath (cough, shortness breath or wheezing.) 1 Inhaler 2    Multiple Vitamins-Minerals (THERAPEUTIC MULTIVITAMIN-MINERALS) tablet Take 1 tablet by mouth daily      vitamin B-12 (CYANOCOBALAMIN) 1000 MCG tablet Take 2 tablets by mouth daily 30 tablet     aspirin 81 MG tablet Take 81 mg by mouth daily      nitroGLYCERIN (NITROSTAT) 0.4 MG SL tablet Place 1 tablet under the tongue every 5 minutes as needed for Chest pain 25 tablet 0    Omega-3 Krill Oil 300 MG CAPS Take 1 tablet by mouth daily          Social History     Tobacco Use    Smoking status: Current Every Day Smoker     Packs/day: 1.00     Years: 60.00     Pack years: 60.00     Types: Cigarettes    Smokeless tobacco: Never Used   Substance Use Topics    Alcohol use: No     Alcohol/week: 0.0 standard drinks    Drug use: No           Review of Systems   Respiratory: Negative for cough, chest tightness, shortness of breath and wheezing. Cardiovascular: Negative for chest pain, palpitations and leg swelling. Skin: Negative for rash and wound. Neurological: Positive for dizziness. Negative for syncope, facial asymmetry, speech difficulty, weakness, light-headedness, numbness and headaches. Objective:   Physical Exam  Vitals signs reviewed. Constitutional:       General: She is not in acute distress. Appearance: Normal appearance. She is well-developed. She is obese. She is not diaphoretic. Eyes:      General: No scleral icterus. Neck:      Musculoskeletal: Neck supple. Thyroid: No thyroid mass or thyromegaly. Vascular: No carotid bruit. Cardiovascular:      Rate and Rhythm: Normal rate and regular rhythm. Pulses: Decreased pulses. Heart sounds: S1 normal and S2 normal. Heart sounds are distant. No murmur. No gallop. Comments: Trace ankle edema. Pulmonary:      Effort: Pulmonary effort is normal. No tachypnea or respiratory distress. Breath sounds: Decreased breath sounds present. No wheezing, rhonchi or rales. Abdominal:      General: Bowel sounds are normal. There is no abdominal bruit. Palpations: Abdomen is soft. There is no hepatomegaly or mass. Tenderness: There is no abdominal tenderness. Musculoskeletal:      Right lower leg: Edema present. Left lower leg: Edema present. Lymphadenopathy:      Cervical: No cervical adenopathy. Skin:     General: Skin is warm and dry. Coloration: Skin is not pale. Nails: There is no clubbing. Neurological:      Mental Status: She is alert and oriented to person, place, and time. Motor: No weakness, tremor, atrophy or abnormal muscle tone. Coordination: Coordination normal.      Gait: Gait abnormal (gait is mildly unsteady. needs minimal/SBA).    Psychiatric:         Speech: Speech normal.         Behavior: Behavior normal.          Lab Results   Component Value Date 65 YRS =, IM, PF, PREFILL SYR, 0.5ML (FLUAD)    8. Essential hypertension  BP: 133/78   At goal and meeting medical guidelines. Continue treatment. 9. Tobacco dependence  Smoking cessation/abstinence advised. Suggest she put her pack of cig in freezer. This may slow her smoking. 10. COPD, mild ([facility]  Monitor. If cannot get done due to [address], will postpone. - Spirometry with bronchodilator; Future    11. Motion sickness, subsequent encounter  Prn med use when riding in car. - meclizine (ANTIVERT) 12.5 MG tablet; Take 1-2 tablets by mouth daily as needed for Dizziness  Dispense: 30 tablet; Refill: 2    12. Diabetes mellitus type 2, diet-controlled ([facility]  Lab Results   Component Value Date    LABA1C 6.0 09/16/2019   very mild. Monitor.   - TSH with Reflex  - Hemoglobin A1C    13. Hypercholesteremia  . LDL improved but not at goal of under 70; It is under 100 now. - Lipid Panel  - TSH with Reflex          Plan:      See orders. See after visit summary, patient instructions, and reference hand-outs. A PRINTED SUMMARY = AVS GIVEN TO THE PATIENT, (or if Virtual Visit, patient told it is available in My Chart or will be mailed if not active on My Chart.)    Discussed use, benefit, and side effects of prescribed medications. Barriers to medication compliance addressed. All patient questions answered.           Lianna Carcamo MD

## 2020-09-25 NOTE — PATIENT INSTRUCTIONS
Check with [facility] and see if they do lung spirometry. If they do not do it at all, I can order a full pulmonary function test but the spirometry is not a big test.  [facility] is not doing these right now. This is to check your lung status due to your smoking. Consider putting your cigarette pack in the freezer at the start of the day. .  This may reduce the number of cigarettes and can help your health and save you some money.

## 2020-09-26 LAB
ESTIMATED AVERAGE GLUCOSE: 134.1 MG/DL
HBA1C MFR BLD: 6.3 %

## 2020-09-29 ENCOUNTER — TELEPHONE (OUTPATIENT)
Dept: INTERNAL MEDICINE CLINIC | Age: 80
End: 2020-09-29

## 2020-12-09 RX ORDER — DULOXETIN HYDROCHLORIDE 60 MG/1
CAPSULE, DELAYED RELEASE ORAL
Qty: 90 CAPSULE | Refills: 3 | Status: SHIPPED | OUTPATIENT
Start: 2020-12-09 | End: 2021-06-07 | Stop reason: SDUPTHER

## 2020-12-10 RX ORDER — METOPROLOL TARTRATE 50 MG/1
TABLET, FILM COATED ORAL
Qty: 180 TABLET | Refills: 3 | Status: SHIPPED | OUTPATIENT
Start: 2020-12-10 | End: 2021-06-07 | Stop reason: SDUPTHER

## 2020-12-28 RX ORDER — VALSARTAN AND HYDROCHLOROTHIAZIDE 320; 25 MG/1; MG/1
TABLET, FILM COATED ORAL
Qty: 90 TABLET | Refills: 3 | Status: SHIPPED | OUTPATIENT
Start: 2020-12-28 | End: 2020-12-30

## 2020-12-28 RX ORDER — PITAVASTATIN CALCIUM 4.18 MG/1
TABLET, FILM COATED ORAL
Qty: 10 TABLET | Refills: 0 | Status: SHIPPED | OUTPATIENT
Start: 2020-12-28 | End: 2021-01-25 | Stop reason: SDUPTHER

## 2020-12-28 NOTE — TELEPHONE ENCOUNTER
Svitlana Bledsoe, pt's daughter called. The pt is needs refills on the following      livalo tab 4 mg  She is out and is waiting for mail order supply yo come in the mail. Asking to get a 10 day supply. todd huggins      Valsartan /25mg  1 qd     #90  Needs script sent to [facility].      Last appt 09/25/20  Lab 09/25/20  Next appt 1/25/21

## 2020-12-30 RX ORDER — VALSARTAN AND HYDROCHLOROTHIAZIDE 320; 25 MG/1; MG/1
TABLET, FILM COATED ORAL
Qty: 90 TABLET | Refills: 3 | Status: SHIPPED | OUTPATIENT
Start: 2020-12-30 | End: 2021-01-25 | Stop reason: ALTCHOICE

## 2021-01-25 ENCOUNTER — OFFICE VISIT (OUTPATIENT)
Dept: INTERNAL MEDICINE CLINIC | Age: 81
End: 2021-01-25
Payer: MEDICARE

## 2021-01-25 VITALS
TEMPERATURE: 96.6 F | SYSTOLIC BLOOD PRESSURE: 134 MMHG | HEART RATE: 68 BPM | BODY MASS INDEX: 43.94 KG/M2 | HEIGHT: 60 IN | DIASTOLIC BLOOD PRESSURE: 76 MMHG

## 2021-01-25 DIAGNOSIS — E70.30 ALBINOIDISM (HCC): ICD-10-CM

## 2021-01-25 DIAGNOSIS — E66.01 MORBID OBESITY (HCC): ICD-10-CM

## 2021-01-25 DIAGNOSIS — T75.3XXD MOTION SICKNESS, SUBSEQUENT ENCOUNTER: ICD-10-CM

## 2021-01-25 DIAGNOSIS — R42 ORTHOSTATIC DIZZINESS: ICD-10-CM

## 2021-01-25 DIAGNOSIS — K21.9 GASTROESOPHAGEAL REFLUX DISEASE, UNSPECIFIED WHETHER ESOPHAGITIS PRESENT: ICD-10-CM

## 2021-01-25 DIAGNOSIS — N18.32 STAGE 3B CHRONIC KIDNEY DISEASE (HCC): ICD-10-CM

## 2021-01-25 DIAGNOSIS — L30.8 OTHER ECZEMA: ICD-10-CM

## 2021-01-25 DIAGNOSIS — I10 ESSENTIAL HYPERTENSION: ICD-10-CM

## 2021-01-25 DIAGNOSIS — J44.9 COPD, MILD (HCC): ICD-10-CM

## 2021-01-25 DIAGNOSIS — I25.10 CORONARY ARTERY DISEASE INVOLVING NATIVE CORONARY ARTERY OF NATIVE HEART WITHOUT ANGINA PECTORIS: Primary | ICD-10-CM

## 2021-01-25 DIAGNOSIS — E79.0 HYPERURICEMIA: ICD-10-CM

## 2021-01-25 DIAGNOSIS — E11.9 DIABETES MELLITUS TYPE 2, DIET-CONTROLLED (HCC): ICD-10-CM

## 2021-01-25 LAB
A/G RATIO: 1.6 (ref 1.1–2.2)
ALBUMIN SERPL-MCNC: 4.1 G/DL (ref 3.4–5)
ALP BLD-CCNC: 123 U/L (ref 40–129)
ALT SERPL-CCNC: 18 U/L (ref 10–40)
ANION GAP SERPL CALCULATED.3IONS-SCNC: 13 MMOL/L (ref 3–16)
AST SERPL-CCNC: 18 U/L (ref 15–37)
BASOPHILS ABSOLUTE: 0.1 K/UL (ref 0–0.2)
BASOPHILS RELATIVE PERCENT: 0.6 %
BILIRUB SERPL-MCNC: 0.3 MG/DL (ref 0–1)
BUN BLDV-MCNC: 28 MG/DL (ref 7–20)
CALCIUM SERPL-MCNC: 9.8 MG/DL (ref 8.3–10.6)
CHLORIDE BLD-SCNC: 100 MMOL/L (ref 99–110)
CO2: 26 MMOL/L (ref 21–32)
CREAT SERPL-MCNC: 1.2 MG/DL (ref 0.6–1.2)
EOSINOPHILS ABSOLUTE: 0.3 K/UL (ref 0–0.6)
EOSINOPHILS RELATIVE PERCENT: 2.8 %
GFR AFRICAN AMERICAN: 52
GFR NON-AFRICAN AMERICAN: 43
GLOBULIN: 2.6 G/DL
GLUCOSE BLD-MCNC: 97 MG/DL (ref 70–99)
HCT VFR BLD CALC: 41 % (ref 36–48)
HEMOGLOBIN: 13.7 G/DL (ref 12–16)
LYMPHOCYTES ABSOLUTE: 3.4 K/UL (ref 1–5.1)
LYMPHOCYTES RELATIVE PERCENT: 28.3 %
MCH RBC QN AUTO: 28.9 PG (ref 26–34)
MCHC RBC AUTO-ENTMCNC: 33.4 G/DL (ref 31–36)
MCV RBC AUTO: 86.5 FL (ref 80–100)
MONOCYTES ABSOLUTE: 1.1 K/UL (ref 0–1.3)
MONOCYTES RELATIVE PERCENT: 8.9 %
NEUTROPHILS ABSOLUTE: 7.1 K/UL (ref 1.7–7.7)
NEUTROPHILS RELATIVE PERCENT: 59.4 %
PDW BLD-RTO: 15.3 % (ref 12.4–15.4)
PLATELET # BLD: 345 K/UL (ref 135–450)
PMV BLD AUTO: 8.2 FL (ref 5–10.5)
POTASSIUM SERPL-SCNC: 4 MMOL/L (ref 3.5–5.1)
RBC # BLD: 4.74 M/UL (ref 4–5.2)
SODIUM BLD-SCNC: 139 MMOL/L (ref 136–145)
TOTAL PROTEIN: 6.7 G/DL (ref 6.4–8.2)
URIC ACID, SERUM: 9.2 MG/DL (ref 2.6–6)
WBC # BLD: 11.9 K/UL (ref 4–11)

## 2021-01-25 PROCEDURE — 99214 OFFICE O/P EST MOD 30 MIN: CPT | Performed by: FAMILY MEDICINE

## 2021-01-25 PROCEDURE — G8399 PT W/DXA RESULTS DOCUMENT: HCPCS | Performed by: FAMILY MEDICINE

## 2021-01-25 PROCEDURE — 3023F SPIROM DOC REV: CPT | Performed by: FAMILY MEDICINE

## 2021-01-25 PROCEDURE — G8417 CALC BMI ABV UP PARAM F/U: HCPCS | Performed by: FAMILY MEDICINE

## 2021-01-25 PROCEDURE — G8484 FLU IMMUNIZE NO ADMIN: HCPCS | Performed by: FAMILY MEDICINE

## 2021-01-25 PROCEDURE — 1090F PRES/ABSN URINE INCON ASSESS: CPT | Performed by: FAMILY MEDICINE

## 2021-01-25 PROCEDURE — G8427 DOCREV CUR MEDS BY ELIG CLIN: HCPCS | Performed by: FAMILY MEDICINE

## 2021-01-25 PROCEDURE — 4004F PT TOBACCO SCREEN RCVD TLK: CPT | Performed by: FAMILY MEDICINE

## 2021-01-25 PROCEDURE — 1123F ACP DISCUSS/DSCN MKR DOCD: CPT | Performed by: FAMILY MEDICINE

## 2021-01-25 PROCEDURE — 4040F PNEUMOC VAC/ADMIN/RCVD: CPT | Performed by: FAMILY MEDICINE

## 2021-01-25 PROCEDURE — G8926 SPIRO NO PERF OR DOC: HCPCS | Performed by: FAMILY MEDICINE

## 2021-01-25 RX ORDER — MECLIZINE HCL 12.5 MG/1
12.5-25 TABLET ORAL DAILY PRN
Qty: 30 TABLET | Refills: 2 | Status: SHIPPED | OUTPATIENT
Start: 2021-01-25 | End: 2021-06-07 | Stop reason: SDUPTHER

## 2021-01-25 RX ORDER — TRIAMCINOLONE ACETONIDE 5 MG/G
CREAM TOPICAL
Qty: 30 G | Refills: 3 | Status: SHIPPED | OUTPATIENT
Start: 2021-01-25 | End: 2022-06-10 | Stop reason: SDUPTHER

## 2021-01-25 RX ORDER — VALSARTAN 320 MG/1
320 TABLET ORAL DAILY
Qty: 90 TABLET | Refills: 1 | Status: SHIPPED | OUTPATIENT
Start: 2021-01-25 | End: 2021-06-07 | Stop reason: SDUPTHER

## 2021-01-25 RX ORDER — PITAVASTATIN CALCIUM 4.18 MG/1
TABLET, FILM COATED ORAL
Qty: 90 TABLET | Refills: 3 | Status: SHIPPED | OUTPATIENT
Start: 2021-01-25 | End: 2021-04-26 | Stop reason: SDUPTHER

## 2021-01-25 RX ORDER — PITAVASTATIN CALCIUM 4.18 MG/1
TABLET, FILM COATED ORAL
Qty: 90 TABLET | Refills: 2 | Status: SHIPPED | OUTPATIENT
Start: 2021-01-25 | End: 2021-01-25 | Stop reason: SDUPTHER

## 2021-01-25 RX ORDER — OMEPRAZOLE 20 MG/1
CAPSULE, DELAYED RELEASE ORAL
Qty: 120 CAPSULE | Refills: 3 | Status: SHIPPED | OUTPATIENT
Start: 2021-01-25 | End: 2021-01-28

## 2021-01-25 NOTE — PROGRESS NOTES
Subjective:      Patient ID: Raiza Beatty is a [de-identified] y.o. female. HPI   Chief Complaint   Patient presents with    Check-Up     FU of CAD, GERD, CKD, DM2 diet controlled, HTN, smoker    She is not on Livalo right now because her mail order insurance says they will not cover it and needed to contact her doctor about it. I don't see where they ever contacted our office for PA.    occ chest pain but feels like it is her esophagus and goes away with a second dose of PPI. Does not get this too often. She does note some vertigo occasionally and rarely takes clonazepam for this. Does note some light headedness when first stands up, lei in the AM.  Not too bad. Continues to smoke and is trying to \"cut back\" but not stop and not sure she wants to change this. See Assessment for more detail on conditions addressed today.     Patient Active Problem List   Diagnosis    Impaired glucose metabolism    Hypercholesteremia    Albinoidism ([address]    Coronary artery disease involving native coronary artery of native heart without angina pectoris    Hearing impaired    Vertigo    GERD (gastroesophageal reflux disease)    Intertrigo    Abnormal EKG    Essential hypertension    History of melanoma    Iron deficiency anemia    Tobacco dependence    Chest wall pain following surgery    Sacroiliac pain    Chronic rhinitis    B12 deficiency    Blepharitis of lower eyelids of both eyes    COPD, mild ([address]    Morbid obesity ([address]    Recurrent major depression in remission ([address]    Chronic use of benzodiazepine for therapeutic purpose    Acute bilateral low back pain without sciatica    Spasm of lumbar paraspinous muscle    Ganglion cyst of volar aspect of left wrist    Chronic kidney disease, stage III (moderate)    Hyperuricemia    Diabetes mellitus type 2, diet-controlled (HCC)    Tobacco abuse    Class 3 severe obesity due to excess calories with body mass index (BMI) of 40.0 to 44.9 in adult [facility])  Coronary artery disease due to lipid rich plaque         Outpatient Medications Marked as Taking for the 1/25/21 encounter (Office Visit) with Ange Louis MD   Medication Sig Dispense Refill    pitavastatin (LIVALO) 4 MG TABS tablet TAKE 1 TABLET NIGHTLY 90 tablet 2    valsartan-hydroCHLOROthiazide (DIOVAN-HCT) 320-25 MG per tablet TAKE 1 TABLET DAILY 90 tablet 3    metoprolol tartrate (LOPRESSOR) 50 MG tablet TAKE 1 TABLET TWICE A  tablet 3    DULoxetine (CYMBALTA) 60 MG extended release capsule TAKE 1 CAPSULE DAILY FOR ARTHRITIS PAIN AND MOOD 90 capsule 3    omeprazole (PRILOSEC) 20 MG delayed release capsule TAKE 1 CAPSULE DAILY 90 capsule 3    acetaminophen (APAP EXTRA STRENGTH) 500 MG tablet Take 2 tablets by mouth 3 times daily as needed for Pain 100 tablet prn    albuterol sulfate HFA (PROAIR HFA) 108 (90 Base) MCG/ACT inhaler Inhale 2 puffs into the lungs every 4 hours as needed for Wheezing or Shortness of Breath (cough, shortness breath or wheezing.) 1 Inhaler 2    Multiple Vitamins-Minerals (THERAPEUTIC MULTIVITAMIN-MINERALS) tablet Take 1 tablet by mouth daily      vitamin B-12 (CYANOCOBALAMIN) 1000 MCG tablet Take 2 tablets by mouth daily 30 tablet     aspirin 81 MG tablet Take 81 mg by mouth daily      Omega-3 Krill Oil 300 MG CAPS Take 1 tablet by mouth daily          Social History     Tobacco Use    Smoking status: Current Every Day Smoker     Packs/day: 1.00     Years: 60.00     Pack years: 60.00     Types: Cigarettes    Smokeless tobacco: Never Used   Substance Use Topics    Alcohol use: No     Alcohol/week: 0.0 standard drinks    Drug use: No         Review of Systems    Objective:   Physical Exam  Vitals signs reviewed. Constitutional:       General: She is not in acute distress. Appearance: Normal appearance. She is well-developed. She is obese. She is not diaphoretic. Comments: Became unsteady and c/o spinning when stood her up to move from exam table to chair. Needed minor support. Eyes:      General: No scleral icterus. Neck:      Musculoskeletal: Neck supple. Thyroid: No thyroid mass or thyromegaly. Vascular: No carotid bruit. Cardiovascular:      Rate and Rhythm: Normal rate and regular rhythm. Pulses: Decreased pulses. Heart sounds: S1 normal and S2 normal. Heart sounds are distant. No murmur. No gallop. Comments: Trace ankle edema. Pulmonary:      Effort: Pulmonary effort is normal. No tachypnea or respiratory distress. Breath sounds: Decreased breath sounds present. No wheezing, rhonchi or rales. Abdominal:      General: Bowel sounds are normal. There is no abdominal bruit. Palpations: Abdomen is soft. There is no hepatomegaly or mass. Tenderness: There is no abdominal tenderness. Musculoskeletal:      Right lower leg: Edema present. Left lower leg: Edema present. Lymphadenopathy:      Cervical: No cervical adenopathy. Skin:     General: Skin is warm and dry. Coloration: Skin is not pale. Nails: There is no clubbing. Neurological:      Mental Status: She is alert and oriented to person, place, and time. Motor: No weakness, tremor, atrophy or abnormal muscle tone. Coordination: Coordination normal.      Gait: Gait abnormal (gait is mildly unsteady. needs minimal/SBA). Psychiatric:         Speech: Speech normal.         Behavior: Behavior normal.         Assessment:      1. Coronary artery disease involving native coronary artery of native heart without angina pectoris  Discussed importance of statin. Do not stop statin and notify office if any issues with coverage asap. - pitavastatin (LIVALO) 4 MG TABS tablet; TAKE 1 TABLET NIGHTLY  Dispense: 90 tablet; Refill: 3    2.  Gastroesophageal reflux disease, unspecified whether esophagitis present Daily PPI and occ prn second dose. Suggest moderate diet and stop smoking.    - omeprazole (PRILOSEC) 20 MG delayed release capsule; Take one every morning before breakfast and can use an occasional twice a day if needed. Dispense: 120 capsule; Refill: 3    3. Diabetes mellitus type 2, diet-controlled ([facility]  Under control but will need to monitor.    - Comprehensive Metabolic Panel  - Hemoglobin A1C    4. COPD, mild ([facility]  Back in 2015. She declined to get updated PFTs or spirometry. Encouraged to DC smoking. 5. Stage 3b chronic kidney disease  Est GFR 43. Avoid NSAIDs and control BP.   - Comprehensive Metabolic Panel    6. Other eczema  - triamcinolone (ARISTOCORT) 0.5 % cream; Apply topically 2 times daily. Dispense: 30 g; Refill: 3    7. Motion sickness, subsequent encounter  - meclizine (ANTIVERT) 12.5 MG tablet; Take 1-2 tablets by mouth daily as needed for Dizziness  Dispense: 30 tablet; Refill: 2    8. Essential hypertension  BP: 134/76   Will take hctz out ofher BP med due to unsteadiness when standing up after supine.    - valsartan (DIOVAN) 320 MG tablet; Take 1 tablet by mouth daily  Dispense: 90 tablet; Refill: 1    9. Orthostatic dizziness  Stop diuretic in her BP med. Continue ARB and beta blocker   - CBC Auto Differential    10. Hyperuricemia  She stopped allopurinol. Also monitor.    - Uric Acid    11. Albinoidism (HCC)  No changes. Stable. 12. Morbid obesity ([facility]  Encouraged mindfulness in eating and smoking. Encouraged weight loss of 10%. Plan:      See orders. See after visit summary, patient instructions, and reference hand-outs. A PRINTED SUMMARY = AVS GIVEN TO THE PATIENT, (or if Virtual Visit, patient told it is available in My Chart or will be mailed if not active on My Chart.)    Discussed use, benefit, and side effects of prescribed medications. Barriers to medication compliance addressed. All patient questions answered. Sami Jeffrey MD

## 2021-01-25 NOTE — PATIENT INSTRUCTIONS
When you smoke, I want you to focus only on the smoking and not anything else at the time. See how you feel while smoking. What makes it good (for example: It makes me feel less nervous)? What makes it bad?  (for example, it makes my tongue and throat burn, It makes me stuffy)    xxxxxxxxxxxxxxxxxxxxxxxxxxxxxx    Keep taking the valsartan-hctz until you get the plain valsartan from [facility]. When it comes, stop taking the Valsartan-HCTZ 320-25 mg.    xxxxxxxxxxxxxxxxxxxxxxxxxxxxxx    You cannot get COVID infection from the vaccine. It is impossible. Serious side effects are only 1 out of 1 million people. Your chance of getting and dying from [place] are much, much higher than this.

## 2021-01-26 DIAGNOSIS — E79.0 HYPERURICEMIA: ICD-10-CM

## 2021-01-26 PROBLEM — Z79.899 CHRONIC USE OF BENZODIAZEPINE FOR THERAPEUTIC PURPOSE: Status: RESOLVED | Noted: 2018-01-12 | Resolved: 2021-01-26

## 2021-01-26 PROBLEM — M54.50 ACUTE BILATERAL LOW BACK PAIN WITHOUT SCIATICA: Status: RESOLVED | Noted: 2018-05-29 | Resolved: 2021-01-26

## 2021-01-26 PROBLEM — M62.830 SPASM OF LUMBAR PARASPINOUS MUSCLE: Status: RESOLVED | Noted: 2018-05-29 | Resolved: 2021-01-26

## 2021-01-26 LAB
ESTIMATED AVERAGE GLUCOSE: 131.2 MG/DL
HBA1C MFR BLD: 6.2 %

## 2021-01-26 RX ORDER — ALLOPURINOL 300 MG/1
300 TABLET ORAL DAILY
Qty: 90 TABLET | Refills: 3 | Status: SHIPPED | OUTPATIENT
Start: 2021-01-26 | End: 2021-06-07 | Stop reason: SDUPTHER

## 2021-01-28 DIAGNOSIS — K21.9 GASTROESOPHAGEAL REFLUX DISEASE, UNSPECIFIED WHETHER ESOPHAGITIS PRESENT: ICD-10-CM

## 2021-01-28 RX ORDER — OMEPRAZOLE 20 MG/1
CAPSULE, DELAYED RELEASE ORAL
Qty: 90 CAPSULE | Refills: 1 | Status: SHIPPED | OUTPATIENT
Start: 2021-01-28 | End: 2021-04-26 | Stop reason: ALTCHOICE

## 2021-02-09 ENCOUNTER — APPOINTMENT (OUTPATIENT)
Dept: CT IMAGING | Age: 81
DRG: 176 | End: 2021-02-09
Payer: MEDICARE

## 2021-02-09 ENCOUNTER — APPOINTMENT (OUTPATIENT)
Dept: GENERAL RADIOLOGY | Age: 81
DRG: 176 | End: 2021-02-09
Payer: MEDICARE

## 2021-02-09 ENCOUNTER — HOSPITAL ENCOUNTER (INPATIENT)
Age: 81
LOS: 3 days | Discharge: HOME HEALTH | DRG: 176 | End: 2021-02-12
Attending: EMERGENCY MEDICINE | Admitting: HOSPITALIST
Payer: MEDICARE

## 2021-02-09 DIAGNOSIS — R06.02 SHORTNESS OF BREATH: ICD-10-CM

## 2021-02-09 DIAGNOSIS — I26.93 SINGLE SUBSEGMENTAL PULMONARY EMBOLISM WITHOUT ACUTE COR PULMONALE (HCC): Primary | ICD-10-CM

## 2021-02-09 PROBLEM — I26.99 PULMONARY EMBOLISM WITHOUT ACUTE COR PULMONALE (HCC): Status: ACTIVE | Noted: 2021-02-09

## 2021-02-09 LAB
A/G RATIO: 1.3 (ref 1.1–2.2)
ALBUMIN SERPL-MCNC: 3.8 G/DL (ref 3.4–5)
ALP BLD-CCNC: 124 U/L (ref 40–129)
ALT SERPL-CCNC: 11 U/L (ref 10–40)
ANION GAP SERPL CALCULATED.3IONS-SCNC: 10 MMOL/L (ref 3–16)
APTT: >248 SEC (ref 24.2–36.2)
AST SERPL-CCNC: 13 U/L (ref 15–37)
ATYPICAL LYMPHOCYTE RELATIVE PERCENT: 9 % (ref 0–6)
BASOPHILS ABSOLUTE: 0 K/UL (ref 0–0.2)
BASOPHILS RELATIVE PERCENT: 0 %
BILIRUB SERPL-MCNC: 0.6 MG/DL (ref 0–1)
BUN BLDV-MCNC: 18 MG/DL (ref 7–20)
CALCIUM SERPL-MCNC: 9.4 MG/DL (ref 8.3–10.6)
CHLORIDE BLD-SCNC: 103 MMOL/L (ref 99–110)
CO2: 25 MMOL/L (ref 21–32)
CREAT SERPL-MCNC: 1 MG/DL (ref 0.6–1.2)
EOSINOPHILS ABSOLUTE: 0 K/UL (ref 0–0.6)
EOSINOPHILS RELATIVE PERCENT: 0 %
GFR AFRICAN AMERICAN: >60
GFR NON-AFRICAN AMERICAN: 53
GLOBULIN: 2.9 G/DL
GLUCOSE BLD-MCNC: 112 MG/DL (ref 70–99)
HCT VFR BLD CALC: 37.3 % (ref 36–48)
HEMOGLOBIN: 12.1 G/DL (ref 12–16)
LACTIC ACID, SEPSIS: 0.9 MMOL/L (ref 0.4–1.9)
LIPASE: 14 U/L (ref 13–60)
LYMPHOCYTES ABSOLUTE: 2.2 K/UL (ref 1–5.1)
LYMPHOCYTES RELATIVE PERCENT: 5 %
MCH RBC QN AUTO: 27.9 PG (ref 26–34)
MCHC RBC AUTO-ENTMCNC: 32.3 G/DL (ref 31–36)
MCV RBC AUTO: 86.2 FL (ref 80–100)
MONOCYTES ABSOLUTE: 1.4 K/UL (ref 0–1.3)
MONOCYTES RELATIVE PERCENT: 9 %
NEUTROPHILS ABSOLUTE: 12.1 K/UL (ref 1.7–7.7)
NEUTROPHILS RELATIVE PERCENT: 77 %
PDW BLD-RTO: 15 % (ref 12.4–15.4)
PLATELET # BLD: 262 K/UL (ref 135–450)
PLATELET SLIDE REVIEW: ADEQUATE
PMV BLD AUTO: 8 FL (ref 5–10.5)
POTASSIUM SERPL-SCNC: 4.3 MMOL/L (ref 3.5–5.1)
PRO-BNP: 1272 PG/ML (ref 0–449)
PROCALCITONIN: 0.06 NG/ML (ref 0–0.15)
RBC # BLD: 4.33 M/UL (ref 4–5.2)
RBC # BLD: NORMAL 10*6/UL
SLIDE REVIEW: ABNORMAL
SODIUM BLD-SCNC: 138 MMOL/L (ref 136–145)
TOTAL PROTEIN: 6.7 G/DL (ref 6.4–8.2)
TROPONIN: <0.01 NG/ML
WBC # BLD: 15.7 K/UL (ref 4–11)

## 2021-02-09 PROCEDURE — 6360000002 HC RX W HCPCS: Performed by: NURSE PRACTITIONER

## 2021-02-09 PROCEDURE — 93005 ELECTROCARDIOGRAM TRACING: CPT | Performed by: NURSE PRACTITIONER

## 2021-02-09 PROCEDURE — 84145 PROCALCITONIN (PCT): CPT

## 2021-02-09 PROCEDURE — 36415 COLL VENOUS BLD VENIPUNCTURE: CPT

## 2021-02-09 PROCEDURE — 6360000004 HC RX CONTRAST MEDICATION: Performed by: EMERGENCY MEDICINE

## 2021-02-09 PROCEDURE — 94761 N-INVAS EAR/PLS OXIMETRY MLT: CPT

## 2021-02-09 PROCEDURE — 87040 BLOOD CULTURE FOR BACTERIA: CPT

## 2021-02-09 PROCEDURE — 2700000000 HC OXYGEN THERAPY PER DAY

## 2021-02-09 PROCEDURE — 6370000000 HC RX 637 (ALT 250 FOR IP): Performed by: PHYSICIAN ASSISTANT

## 2021-02-09 PROCEDURE — 83880 ASSAY OF NATRIURETIC PEPTIDE: CPT

## 2021-02-09 PROCEDURE — 94640 AIRWAY INHALATION TREATMENT: CPT

## 2021-02-09 PROCEDURE — 71045 X-RAY EXAM CHEST 1 VIEW: CPT

## 2021-02-09 PROCEDURE — 83690 ASSAY OF LIPASE: CPT

## 2021-02-09 PROCEDURE — 84484 ASSAY OF TROPONIN QUANT: CPT

## 2021-02-09 PROCEDURE — 83605 ASSAY OF LACTIC ACID: CPT

## 2021-02-09 PROCEDURE — 80053 COMPREHEN METABOLIC PANEL: CPT

## 2021-02-09 PROCEDURE — 74177 CT ABD & PELVIS W/CONTRAST: CPT

## 2021-02-09 PROCEDURE — 1200000000 HC SEMI PRIVATE

## 2021-02-09 PROCEDURE — 85730 THROMBOPLASTIN TIME PARTIAL: CPT

## 2021-02-09 PROCEDURE — 71260 CT THORAX DX C+: CPT

## 2021-02-09 PROCEDURE — 85025 COMPLETE CBC W/AUTO DIFF WBC: CPT

## 2021-02-09 PROCEDURE — U0003 INFECTIOUS AGENT DETECTION BY NUCLEIC ACID (DNA OR RNA); SEVERE ACUTE RESPIRATORY SYNDROME CORONAVIRUS 2 (SARS-COV-2) (CORONAVIRUS DISEASE [COVID-19]), AMPLIFIED PROBE TECHNIQUE, MAKING USE OF HIGH THROUGHPUT TECHNOLOGIES AS DESCRIBED BY CMS-2020-01-R: HCPCS

## 2021-02-09 PROCEDURE — 99284 EMERGENCY DEPT VISIT MOD MDM: CPT

## 2021-02-09 PROCEDURE — 96374 THER/PROPH/DIAG INJ IV PUSH: CPT

## 2021-02-09 RX ORDER — SODIUM CHLORIDE 0.9 % (FLUSH) 0.9 %
10 SYRINGE (ML) INJECTION EVERY 12 HOURS SCHEDULED
Status: DISCONTINUED | OUTPATIENT
Start: 2021-02-09 | End: 2021-02-12 | Stop reason: HOSPADM

## 2021-02-09 RX ORDER — HEPARIN SODIUM 1000 [USP'U]/ML
80 INJECTION, SOLUTION INTRAVENOUS; SUBCUTANEOUS PRN
Status: DISCONTINUED | OUTPATIENT
Start: 2021-02-09 | End: 2021-02-11 | Stop reason: CLARIF

## 2021-02-09 RX ORDER — SODIUM CHLORIDE 0.9 % (FLUSH) 0.9 %
10 SYRINGE (ML) INJECTION PRN
Status: DISCONTINUED | OUTPATIENT
Start: 2021-02-09 | End: 2021-02-12 | Stop reason: HOSPADM

## 2021-02-09 RX ORDER — METOPROLOL TARTRATE 50 MG/1
50 TABLET, FILM COATED ORAL 2 TIMES DAILY
Status: DISCONTINUED | OUTPATIENT
Start: 2021-02-09 | End: 2021-02-12 | Stop reason: HOSPADM

## 2021-02-09 RX ORDER — ONDANSETRON 2 MG/ML
4 INJECTION INTRAMUSCULAR; INTRAVENOUS EVERY 6 HOURS PRN
Status: DISCONTINUED | OUTPATIENT
Start: 2021-02-09 | End: 2021-02-12 | Stop reason: HOSPADM

## 2021-02-09 RX ORDER — MECLIZINE HCL 12.5 MG/1
12.5 TABLET ORAL DAILY PRN
Status: DISCONTINUED | OUTPATIENT
Start: 2021-02-09 | End: 2021-02-12 | Stop reason: HOSPADM

## 2021-02-09 RX ORDER — VALSARTAN 160 MG/1
320 TABLET ORAL DAILY
Status: DISCONTINUED | OUTPATIENT
Start: 2021-02-10 | End: 2021-02-12 | Stop reason: HOSPADM

## 2021-02-09 RX ORDER — ACETAMINOPHEN 500 MG
1000 TABLET ORAL 3 TIMES DAILY PRN
Status: DISCONTINUED | OUTPATIENT
Start: 2021-02-09 | End: 2021-02-12 | Stop reason: HOSPADM

## 2021-02-09 RX ORDER — PANTOPRAZOLE SODIUM 40 MG/1
40 TABLET, DELAYED RELEASE ORAL
Status: DISCONTINUED | OUTPATIENT
Start: 2021-02-10 | End: 2021-02-12 | Stop reason: HOSPADM

## 2021-02-09 RX ORDER — HEPARIN SODIUM 1000 [USP'U]/ML
40 INJECTION, SOLUTION INTRAVENOUS; SUBCUTANEOUS PRN
Status: DISCONTINUED | OUTPATIENT
Start: 2021-02-09 | End: 2021-02-11 | Stop reason: CLARIF

## 2021-02-09 RX ORDER — ALBUTEROL SULFATE 90 UG/1
2 AEROSOL, METERED RESPIRATORY (INHALATION) EVERY 4 HOURS PRN
Status: DISCONTINUED | OUTPATIENT
Start: 2021-02-09 | End: 2021-02-12 | Stop reason: HOSPADM

## 2021-02-09 RX ORDER — ALBUTEROL SULFATE 2.5 MG/3ML
5 SOLUTION RESPIRATORY (INHALATION) ONCE
Status: COMPLETED | OUTPATIENT
Start: 2021-02-09 | End: 2021-02-09

## 2021-02-09 RX ORDER — HEPARIN SODIUM 1000 [USP'U]/ML
80 INJECTION, SOLUTION INTRAVENOUS; SUBCUTANEOUS ONCE
Status: COMPLETED | OUTPATIENT
Start: 2021-02-09 | End: 2021-02-09

## 2021-02-09 RX ORDER — ALLOPURINOL 300 MG/1
300 TABLET ORAL DAILY
Status: DISCONTINUED | OUTPATIENT
Start: 2021-02-10 | End: 2021-02-12 | Stop reason: HOSPADM

## 2021-02-09 RX ORDER — DULOXETIN HYDROCHLORIDE 60 MG/1
60 CAPSULE, DELAYED RELEASE ORAL DAILY
Status: DISCONTINUED | OUTPATIENT
Start: 2021-02-10 | End: 2021-02-12 | Stop reason: HOSPADM

## 2021-02-09 RX ORDER — POLYETHYLENE GLYCOL 3350 17 G/17G
17 POWDER, FOR SOLUTION ORAL DAILY PRN
Status: DISCONTINUED | OUTPATIENT
Start: 2021-02-09 | End: 2021-02-12 | Stop reason: HOSPADM

## 2021-02-09 RX ORDER — HEPARIN SODIUM 10000 [USP'U]/100ML
5-30 INJECTION, SOLUTION INTRAVENOUS CONTINUOUS
Status: DISCONTINUED | OUTPATIENT
Start: 2021-02-09 | End: 2021-02-11

## 2021-02-09 RX ORDER — ASPIRIN 81 MG/1
81 TABLET ORAL DAILY
Status: DISCONTINUED | OUTPATIENT
Start: 2021-02-10 | End: 2021-02-12 | Stop reason: HOSPADM

## 2021-02-09 RX ORDER — FENTANYL CITRATE 50 UG/ML
25 INJECTION, SOLUTION INTRAMUSCULAR; INTRAVENOUS ONCE
Status: COMPLETED | OUTPATIENT
Start: 2021-02-09 | End: 2021-02-09

## 2021-02-09 RX ORDER — MORPHINE SULFATE 4 MG/ML
4 INJECTION, SOLUTION INTRAMUSCULAR; INTRAVENOUS ONCE
Status: COMPLETED | OUTPATIENT
Start: 2021-02-09 | End: 2021-02-09

## 2021-02-09 RX ADMIN — HEPARIN SODIUM 18 UNITS/KG/HR: 10000 INJECTION, SOLUTION INTRAVENOUS at 21:24

## 2021-02-09 RX ADMIN — ALBUTEROL SULFATE 5 MG: 2.5 SOLUTION RESPIRATORY (INHALATION) at 17:52

## 2021-02-09 RX ADMIN — METOPROLOL TARTRATE 50 MG: 50 TABLET, FILM COATED ORAL at 23:33

## 2021-02-09 RX ADMIN — IOPAMIDOL 100 ML: 755 INJECTION, SOLUTION INTRAVENOUS at 19:33

## 2021-02-09 RX ADMIN — HEPARIN SODIUM 8200 UNITS: 1000 INJECTION INTRAVENOUS; SUBCUTANEOUS at 21:17

## 2021-02-09 RX ADMIN — MORPHINE SULFATE 4 MG: 4 INJECTION, SOLUTION INTRAMUSCULAR; INTRAVENOUS at 19:34

## 2021-02-09 RX ADMIN — FENTANYL CITRATE 25 MCG: 50 INJECTION, SOLUTION INTRAMUSCULAR; INTRAVENOUS at 21:13

## 2021-02-09 ASSESSMENT — PAIN SCALES - GENERAL
PAINLEVEL_OUTOF10: 9
PAINLEVEL_OUTOF10: 3
PAINLEVEL_OUTOF10: 4

## 2021-02-09 ASSESSMENT — ENCOUNTER SYMPTOMS
SHORTNESS OF BREATH: 1
NAUSEA: 0
CHEST TIGHTNESS: 0
VOMITING: 0
DIARRHEA: 0
ABDOMINAL PAIN: 0
BACK PAIN: 1

## 2021-02-09 ASSESSMENT — PAIN DESCRIPTION - LOCATION: LOCATION: FLANK

## 2021-02-09 NOTE — ED PROVIDER NOTES
[address]        Pt Name: Gaby Bravo  MRN: [id number]  [place] 1940  Date of evaluation: 2/9/2021  Provider: ELIZABETH Marquez - LOUIE  PCP: Drea Alvarez MD     I have seen and evaluated this patient with my supervising physician Oneal [email or web address]       Chief Complaint   Patient presents with    Flank Pain     Pt reports right sided flank pain started last night, pt also reports shortness of breath, cough. Also feels discomfort in esophagus. Arrived via EMS. HISTORY OF PRESENT ILLNESS   (Location, Timing/Onset, Context/Setting, Quality, Duration, Modifying Factors, Severity, Associated Signs and Symptoms)  Note limiting factors. Gaby Bravo is a [de-identified] y.o. female presents to the emergency department with complaint of left-sided flank/kidney/lung pain since last night. The patient reports that she has been coughing and shortness of breath since time of onset. She denies mitigating or exacerbating factors. Constant since time of onset. Transported per EMS, they did report that she was 92% on room air and placed on 2 L of oxygen. Denies history of supplemental oxygen use. Denies recent travel or sick exposure. Hacking, nonproductive cough. Denies any fever, lightheadedness, dizziness, visual disturbances. No chest pain or pressure. No neck pain. No congestion. No abdominal pain, nausea, vomiting, diarrhea, constipation, or dysuria. No rash. Nursing Notes were all reviewed and agreed with or any disagreements were addressed in the HPI. REVIEW OF SYSTEMS    (2-9 systems for level 4, 10 or more for level 5)     Review of Systems   Constitutional: Negative for activity change, chills and fever. Respiratory: Positive for shortness of breath. Negative for chest tightness. Cardiovascular: Negative for chest pain. Gastrointestinal: Negative for abdominal pain, diarrhea, nausea and vomiting. Genitourinary: Positive for flank pain. Negative for dysuria. Musculoskeletal: Positive for back pain. All other systems reviewed and are negative. Positives and Pertinent negatives as per HPI. Except as noted above in the ROS, all other systems were reviewed and negative.        PAST MEDICAL HISTORY     Past Medical History:   Diagnosis Date    [facility])     CAD (coronary artery disease)     COPD [facility]chronic obstructive pulmonary disease) ([facility]     Depression 11/27/2017    Diabetes mellitus ([facility]     Diet-controlled diabetes mellitus ([facility] 6/9/2019    Hearing impaired     Hiatal hernia     Hyperlipidemia     true allergy to statins    Hypertension     Kidney stone 2/3/2013    [facility]    [place] [facility] Ot[facility]     Dr. Cara Matos    Pneumonia     Stented coronary artery          SURGICAL HISTORY     Past Surgical History:   Procedure Laterality Date    COLONOSCOPY      CORONARY ANGIOPLASTY WITH STENT PLACEMENT      CORONARY ARTERY BYPASS GRAFT  03/17/15    Dr. Ryan Reagin - x 3 LIMA-LAD, SV-OM2, SV-RCA    ENDOSCOPY, COLON, DIAGNOSTIC      KNEE SURGERY      SKIN BIOPSY      arm,back, upper left arm    SKIN CANCER EXCISION  <2000    Left upper arm;  Melanoma    STAPEDES SURGERY  [id number]    Dr. Dyer Doctor       Current Discharge Medication List      CONTINUE these medications which have NOT CHANGED    Details   omeprazole (PRILOSEC) 20 MG delayed release capsule Take one every morning before breakfast  Qty: 90 capsule, Refills: 1    Associated Diagnoses: Gastroesophageal reflux disease, unspecified whether esophagitis present      allopurinol (ZYLOPRIM) 300 MG tablet Take 1 tablet by mouth daily To protect kidneys and prevent joint pain from gout  Qty: 90 tablet, Refills: 3    Associated Diagnoses: Hyperuricemia      pitavastatin (LIVALO) 4 MG TABS tablet TAKE 1 TABLET NIGHTLY  Qty: 90 tablet, Refills: 3 Comments: This is medically necessary for SECONDARY PREVENTION. She cannot tolerate any other statin --- truly allergic to others. Associated Diagnoses: Coronary artery disease involving native coronary artery of native heart without angina pectoris      meclizine (ANTIVERT) 12.5 MG tablet Take 1-2 tablets by mouth daily as needed for Dizziness  Qty: 30 tablet, Refills: 2    Associated Diagnoses: Motion sickness, subsequent encounter      valsartan (DIOVAN) 320 MG tablet Take 1 tablet by mouth daily  Qty: 90 tablet, Refills: 1    Comments: This replaces Valsartan-HCTZ. Taking off the diurectic.   Associated Diagnoses: Essential hypertension      metoprolol tartrate (LOPRESSOR) 50 MG tablet TAKE 1 TABLET TWICE A DAY  Qty: 180 tablet, Refills: 3      DULoxetine (CYMBALTA) 60 MG extended release capsule TAKE 1 CAPSULE DAILY FOR ARTHRITIS PAIN AND MOOD  Qty: 90 capsule, Refills: 3    Associated Diagnoses: Mild episode of recurrent major depressive disorder ([facility])      fluticasone (FLONASE) 50 MCG/ACT nasal spray USE 1 SPRAY NASALLY DAILY  Qty: 48 g, Refills: 2    Associated Diagnoses: Chronic rhinitis      acetaminophen (APAP EXTRA STRENGTH) 500 MG tablet Take 2 tablets by mouth 3 times daily as needed for Pain  Qty: 100 tablet, Refills: prn    Associated Diagnoses: Right flank pain      albuterol sulfate HFA (PROAIR HFA) 108 (90 Base) MCG/ACT inhaler Inhale 2 puffs into the lungs every 4 hours as needed for Wheezing or Shortness of Breath (cough, shortness breath or wheezing.)  Qty: 1 Inhaler, Refills: 2    Associated Diagnoses: Chronic obstructive pulmonary disease, unspecified COPD type ([facility])      promethazine (PHENERGAN) 25 MG tablet Take 0.5 tablets by mouth every 6 hours as needed for Nausea  Qty: 60 tablet, Refills: 1    Associated Diagnoses: Nausea      Multiple Vitamins-Minerals (THERAPEUTIC MULTIVITAMIN-MINERALS) tablet Take 1 tablet by mouth daily vitamin B-12 (CYANOCOBALAMIN) 1000 MCG tablet Take 2 tablets by mouth daily  Qty: 30 tablet    Associated Diagnoses: B12 deficiency      aspirin 81 MG tablet Take 81 mg by mouth daily      nitroGLYCERIN (NITROSTAT) 0.4 MG SL tablet Place 1 tablet under the tongue every 5 minutes as needed for Chest pain  Qty: 25 tablet, Refills: 0    Associated Diagnoses: Coronary artery disease involving native coronary artery of native heart without angina pectoris      Omega-3 Krill Oil 300 MG CAPS Take 1 tablet by mouth daily     Associated Diagnoses: Hyperlipidemia               ALLERGIES     Pcn [penicillins], Tetanus toxoids, and Statins    FAMILYHISTORY       Family History   Problem Relation Age of Onset    Esophageal Cancer Son     Cancer Other         very strong in family    Seizures Daughter           SOCIAL HISTORY       Social History     Tobacco Use    Smoking status: Current Every Day Smoker     Packs/day: 1.00     Years: 60.00     Pack years: 60.00     Types: Cigarettes    Smokeless tobacco: Never Used    Tobacco comment: 16 cigs per day   Substance Use Topics    Alcohol use: No     Alcohol/week: 0.0 standard drinks    Drug use: No       SCREENINGS             PHYSICAL EXAM    (up to 7 for level 4, 8 or more for level 5)     ED Triage Vitals [02/09/21 1638]   BP Temp Temp src Pulse Resp SpO2 Height Weight   (!) 155/62 -- -- 70 16 96 % 5' (1.524 m) 226 lb (102.5 kg)       Physical Exam  Vitals signs and nursing note reviewed. Constitutional:       Appearance: She is well-developed. She is not diaphoretic. HENT:      Head: Normocephalic and atraumatic. Right Ear: External ear normal.      Left Ear: External ear normal.   Eyes:      General:         Right eye: No discharge. Left eye: No discharge. Neck:      Musculoskeletal: Normal range of motion and neck supple. Vascular: No JVD. Cardiovascular:      Rate and Rhythm: Normal rate and regular rhythm. Pulses: Normal pulses. Heart sounds: Normal heart sounds. Pulmonary:      Effort: Pulmonary effort is normal. No respiratory distress. Breath sounds: Normal breath sounds. Abdominal:      Palpations: Abdomen is soft. Musculoskeletal: Normal range of motion. Back:    Skin:     General: Skin is warm and dry. Coloration: Skin is not pale. Neurological:      Mental Status: She is alert and oriented to person, place, and time.    Psychiatric:         Behavior: Behavior normal.         DIAGNOSTIC RESULTS   LABS:    Labs Reviewed   CBC WITH AUTO DIFFERENTIAL - Abnormal; Notable for the following components:       Result Value    WBC 15.7 (*)     Neutrophils Absolute 12.1 (*)     Monocytes Absolute 1.4 (*)     Atypical Lymphocytes Relative 9 (*)     All other components within normal limits    Narrative:     Performed at:  [facility]  [employer]   Phone [phone]   COMPREHENSIVE METABOLIC PANEL - Abnormal; Notable for the following components:    Glucose 112 (*)     GFR Non- 53 (*)     AST 13 (*)     All other components within normal limits    Narrative:     Performed at:  [facility] [address]   Phone 21  - Abnormal; Notable for the following components:    Pro-BNP 1,272 (*)     All other components within normal limits    Narrative:     Performed at:  [facility]  [employer]   Phone [phone]   CULTURE, BLOOD 1   CULTURE, BLOOD 2   TROPONIN    Narrative:     Performed at:  [facility]  [employer]   Phone [phone]   LACTATE, SEPSIS    Narrative:     Performed at:  [facility]  [employer]   Phone [phone]   PROCALCITONIN    Narrative: Performed at:  [facility]  [address] [facility] [address]   Phone [phone]   LIPASE    Narrative:     Performed at:  [facility]  [address],  [place], [address]   Phone [phone]   URINE RT REFLEX TO CULTURE   COVID-19   APTT   APTT   APTT   BASIC METABOLIC PANEL W/ REFLEX TO MG FOR LOW K   CBC WITH AUTO DIFFERENTIAL       All other labs were within normal range or not returned as of this dictation. EKG: All EKG's are interpreted by the Emergency Department Physician in the absence of a cardiologist.  Please see their note for interpretation of EKG. RADIOLOGY:   Non-plain film images such as CT, Ultrasound and MRI are read by the radiologist. Plain radiographic images are visualized and preliminarily interpreted by the ED Provider with the below findings:        Interpretation per the Radiologist below, if available at the time of this note:    CT CHEST PULMONARY EMBOLISM W CONTRAST   Final Result   Single subsegmental acute pulmonary embolus along the pulmonary artery to the   right lower lobe extending distally. Mildly dilated and atherosclerotic thoracic aorta with no aneurysm or   dissection. Status post CABG surgery with no mediastinal mass or adenopathy. Mild bibasilar atelectasis or early infiltrates and associated tiny bibasilar   pleural effusions which is more prominent on the right. The findings were called to Dr. Kay Byrne at the time of the interpretation at   8:15 p.m.         CT ABDOMEN PELVIS W IV CONTRAST Additional Contrast? None   Final Result   No acute inflammatory abnormality in the abdomen or pelvis is identified. XR CHEST PORTABLE   Final Result   Postop changes along the sternum with stable cardiomegaly. Minimal bibasilar discoid atelectasis or scarring which is much less   prominent.          VL Extremity Venous Bilateral    (Results Pending) No results found. PROCEDURES   Unless otherwise noted below, none     Procedures    CRITICAL CARE TIME   The total critical care time spent while evaluating and treating this patient was at least 48 minutes. This excludes time spent doing separately billable procedures. This includes time at the bedside, data interpretation, medication management, obtaining critical history from collateral sources if the patient is unable to provide it directly, and physician consultation. Specifics of interventions taken and potentially life-threatening diagnostic considerations are listed above in the medical decision making.       CONSULTS:  IP CONSULT TO HOSPITALIST      EMERGENCY DEPARTMENT COURSE and DIFFERENTIAL DIAGNOSIS/MDM:   Vitals:    Vitals:    02/09/21 1934 02/09/21 2118 02/09/21 2203 02/09/21 2245   BP:  123/64 106/60 (!) 157/67   Pulse: 77 76 75 80   Resp: 20 26 26 20   Temp:    98 °F (36.7 °C)   TempSrc:    Oral   SpO2: 94% 96% 95% 94%   Weight:       Height:           Patient was given the following medications:  Medications   heparin (porcine) injection 8,200 Units (has no administration in time range)   heparin (porcine) injection 4,100 Units (has no administration in time range)   heparin 25,000 units in dextrose 5% 250 mL (premix) infusion (18 Units/kg/hr × 102.5 kg Intravenous New Bag 2/9/21 2124)   acetaminophen (TYLENOL) tablet 1,000 mg (has no administration in time range)   albuterol sulfate  (90 Base) MCG/ACT inhaler 2 puff (has no administration in time range)   aspirin EC tablet 81 mg (has no administration in time range)   DULoxetine (CYMBALTA) extended release capsule 60 mg (has no administration in time range)   meclizine (ANTIVERT) tablet 12.5 mg (has no administration in time range)   metoprolol tartrate (LOPRESSOR) tablet 50 mg (has no administration in time range)   pantoprazole (PROTONIX) tablet 40 mg (has no administration in time range) pitavastatin (LIVALO) tablet 4 mg - PATIENT SUPPLIED (has no administration in time range)   valsartan (DIOVAN) tablet 320 mg (has no administration in time range)   sodium chloride flush 0.9 % injection 10 mL (10 mLs Intravenous Not Given 2/9/21 2259)   sodium chloride flush 0.9 % injection 10 mL (has no administration in time range)   polyethylene glycol (GLYCOLAX) packet 17 g (has no administration in time range)   ondansetron (ZOFRAN) injection 4 mg (has no administration in time range)   allopurinol (ZYLOPRIM) tablet 300 mg (has no administration in time range)   albuterol (PROVENTIL) nebulizer solution 5 mg (5 mg Nebulization Given 2/9/21 1752)   morphine injection 4 mg (4 mg Intravenous Given 2/9/21 1934)   iopamidol (ISOVUE-370) 76 % injection 100 mL (100 mLs Intravenous Given 2/9/21 1933)   heparin (porcine) injection 8,200 Units (8,200 Units Intravenous Given 2/9/21 2117)   fentaNYL (SUBLIMAZE) injection 25 mcg (25 mcg Intravenous Given 2/9/21 2113)           Briefly, this is a [de-identified]year old female that presents to the emergency department with complaint of left-sided flank/kidney/lung pain since last night. The patient reports that she has been coughing and shortness of breath since time of onset. She denies mitigating or exacerbating factors. Constant since time of onset. Transported per EMS, they did report that she was 92% on room air and placed on 2 L of oxygen. Denies history of supplemental oxygen use. Denies recent travel or sick exposure. Hacking, nonproductive cough. Patient was given pain medication including morphine. She is given 2 nebulizers here in the ER. CT scan ordered as patient is expressing pleuritic chest pain. She appears short of breath at rest.  She is 92% on room air and placed on 3 L of supplemental oxygen. CBC shows a white blood cell count of 15.7. CMP is unremarkable. Troponin negative. BNP 1272. Lactate 0.9. Pro-Nicholas 0.06.   Lipase normal. CT CHEST PULMONARY EMBOLISM W CONTRAST (Final result)  Result time 02/09/21 20:19:14  Final result by Dulce Henderson MD (02/09/21 20:19:14)                Impression:    Single subsegmental acute pulmonary embolus along the pulmonary artery to the   right lower lobe extending distally. Mildly dilated and atherosclerotic thoracic aorta with no aneurysm or   dissection. Status post CABG surgery with no mediastinal mass or adenopathy. Mild bibasilar atelectasis or early infiltrates and associated tiny bibasilar   pleural effusions which is more prominent on the right. Patient is placed on high-dose heparin drip for PE treatment. Patient is agreeable regarding plan of care. She is still experiencing right-sided pleuritic back pain with inspiration, then given 25 mcg of fentanyl. The patient be admitted to hospitalist services. Hospitalist is agreeable to admit this patient. FINAL IMPRESSION      1. Single subsegmental pulmonary embolism without acute cor pulmonale (HCC)    2. Shortness of breath          DISPOSITION/PLAN   DISPOSITION Admitted 02/09/2021 08:57:37 PM      PATIENT REFERREDTO:  No follow-up provider specified.     DISCHARGE MEDICATIONS:  Current Discharge Medication List          DISCONTINUED MEDICATIONS:  Current Discharge Medication List                 (Please note that portions of this note were completed with a voice recognition program.  Efforts were made to edit the dictations but occasionally words are mis-transcribed.)    ELIZABETH Roberts CNP (electronically signed)           ELIZABETH Roberts CNP  02/09/21 9199

## 2021-02-09 NOTE — ED NOTES
Bed: 26  Expected date:   Expected time:   Means of arrival:   Comments:  Renetta De La Cruz RN  02/09/21 8937

## 2021-02-09 NOTE — ED NOTES
Pt presented to the ED reporting SOB and right sided flank pain since last night. Pt denies CP or cough, denies fevers, N/V/D or dysuria.       Benjamin Chambers RN  02/09/21 7691

## 2021-02-10 ENCOUNTER — TELEPHONE (OUTPATIENT)
Dept: INTERNAL MEDICINE CLINIC | Age: 81
End: 2021-02-10

## 2021-02-10 LAB
ANION GAP SERPL CALCULATED.3IONS-SCNC: 10 MMOL/L (ref 3–16)
APTT: 239.4 SEC (ref 24.2–36.2)
APTT: 55.5 SEC (ref 24.2–36.2)
APTT: 78.9 SEC (ref 24.2–36.2)
BASOPHILS ABSOLUTE: 0.1 K/UL (ref 0–0.2)
BASOPHILS RELATIVE PERCENT: 0.9 %
BILIRUBIN URINE: ABNORMAL
BLOOD, URINE: ABNORMAL
BUN BLDV-MCNC: 17 MG/DL (ref 7–20)
CALCIUM SERPL-MCNC: 9.4 MG/DL (ref 8.3–10.6)
CHLORIDE BLD-SCNC: 104 MMOL/L (ref 99–110)
CLARITY: ABNORMAL
CO2: 23 MMOL/L (ref 21–32)
COLOR: ABNORMAL
COMMENT UA: ABNORMAL
CREAT SERPL-MCNC: 1 MG/DL (ref 0.6–1.2)
EKG ATRIAL RATE: 68 BPM
EKG DIAGNOSIS: NORMAL
EKG P AXIS: 48 DEGREES
EKG P-R INTERVAL: 170 MS
EKG Q-T INTERVAL: 424 MS
EKG QRS DURATION: 90 MS
EKG QTC CALCULATION (BAZETT): 450 MS
EKG R AXIS: -32 DEGREES
EKG T AXIS: 72 DEGREES
EKG VENTRICULAR RATE: 68 BPM
EOSINOPHILS ABSOLUTE: 0 K/UL (ref 0–0.6)
EOSINOPHILS RELATIVE PERCENT: 0.3 %
EPITHELIAL CELLS, UA: 9 /HPF (ref 0–5)
GFR AFRICAN AMERICAN: >60
GFR NON-AFRICAN AMERICAN: 53
GLUCOSE BLD-MCNC: 120 MG/DL (ref 70–99)
GLUCOSE BLD-MCNC: 123 MG/DL (ref 70–99)
GLUCOSE URINE: NEGATIVE MG/DL
HCT VFR BLD CALC: 36.1 % (ref 36–48)
HEMATOLOGY PATH CONSULT: NORMAL
HEMOGLOBIN: 11.8 G/DL (ref 12–16)
KETONES, URINE: NEGATIVE MG/DL
LEUKOCYTE ESTERASE, URINE: NEGATIVE
LYMPHOCYTES ABSOLUTE: 2.4 K/UL (ref 1–5.1)
LYMPHOCYTES RELATIVE PERCENT: 15.4 %
MCH RBC QN AUTO: 28.3 PG (ref 26–34)
MCHC RBC AUTO-ENTMCNC: 32.7 G/DL (ref 31–36)
MCV RBC AUTO: 86.7 FL (ref 80–100)
MICROSCOPIC EXAMINATION: YES
MONOCYTES ABSOLUTE: 1.7 K/UL (ref 0–1.3)
MONOCYTES RELATIVE PERCENT: 10.5 %
NEUTROPHILS ABSOLUTE: 11.5 K/UL (ref 1.7–7.7)
NEUTROPHILS RELATIVE PERCENT: 72.9 %
NITRITE, URINE: NEGATIVE
PDW BLD-RTO: 15.4 % (ref 12.4–15.4)
PERFORMED ON: ABNORMAL
PH UA: 5 (ref 5–8)
PLATELET # BLD: 242 K/UL (ref 135–450)
PMV BLD AUTO: 8 FL (ref 5–10.5)
POTASSIUM REFLEX MAGNESIUM: 4.1 MMOL/L (ref 3.5–5.1)
PROTEIN UA: ABNORMAL MG/DL
RBC # BLD: 4.17 M/UL (ref 4–5.2)
RBC UA: 14 /HPF (ref 0–4)
SARS-COV-2: NOT DETECTED
SODIUM BLD-SCNC: 137 MMOL/L (ref 136–145)
SPECIFIC GRAVITY UA: >1.03 (ref 1–1.03)
URINE REFLEX TO CULTURE: ABNORMAL
URINE TYPE: ABNORMAL
UROBILINOGEN, URINE: 1 E.U./DL
WBC # BLD: 15.8 K/UL (ref 4–11)
WBC UA: 5 /HPF (ref 0–5)

## 2021-02-10 PROCEDURE — 81001 URINALYSIS AUTO W/SCOPE: CPT

## 2021-02-10 PROCEDURE — 85730 THROMBOPLASTIN TIME PARTIAL: CPT

## 2021-02-10 PROCEDURE — 86146 BETA-2 GLYCOPROTEIN ANTIBODY: CPT

## 2021-02-10 PROCEDURE — 6360000002 HC RX W HCPCS: Performed by: PHYSICIAN ASSISTANT

## 2021-02-10 PROCEDURE — 81241 F5 GENE: CPT

## 2021-02-10 PROCEDURE — 85670 THROMBIN TIME PLASMA: CPT

## 2021-02-10 PROCEDURE — 86147 CARDIOLIPIN ANTIBODY EA IG: CPT

## 2021-02-10 PROCEDURE — 1200000000 HC SEMI PRIVATE

## 2021-02-10 PROCEDURE — 85613 RUSSELL VIPER VENOM DILUTED: CPT

## 2021-02-10 PROCEDURE — 85025 COMPLETE CBC W/AUTO DIFF WBC: CPT

## 2021-02-10 PROCEDURE — 85732 THROMBOPLASTIN TIME PARTIAL: CPT

## 2021-02-10 PROCEDURE — 81240 F2 GENE: CPT

## 2021-02-10 PROCEDURE — 94640 AIRWAY INHALATION TREATMENT: CPT

## 2021-02-10 PROCEDURE — 85635 REPTILASE TEST: CPT

## 2021-02-10 PROCEDURE — 80048 BASIC METABOLIC PNL TOTAL CA: CPT

## 2021-02-10 PROCEDURE — 93010 ELECTROCARDIOGRAM REPORT: CPT | Performed by: INTERNAL MEDICINE

## 2021-02-10 PROCEDURE — 6370000000 HC RX 637 (ALT 250 FOR IP): Performed by: PHYSICIAN ASSISTANT

## 2021-02-10 PROCEDURE — 85610 PROTHROMBIN TIME: CPT

## 2021-02-10 PROCEDURE — 36415 COLL VENOUS BLD VENIPUNCTURE: CPT

## 2021-02-10 PROCEDURE — 2700000000 HC OXYGEN THERAPY PER DAY

## 2021-02-10 PROCEDURE — 2580000003 HC RX 258: Performed by: PHYSICIAN ASSISTANT

## 2021-02-10 RX ORDER — KETOROLAC TROMETHAMINE 15 MG/ML
15 INJECTION, SOLUTION INTRAMUSCULAR; INTRAVENOUS EVERY 6 HOURS PRN
Status: DISCONTINUED | OUTPATIENT
Start: 2021-02-10 | End: 2021-02-12 | Stop reason: HOSPADM

## 2021-02-10 RX ORDER — BENZONATATE 100 MG/1
100 CAPSULE ORAL 3 TIMES DAILY PRN
Status: DISCONTINUED | OUTPATIENT
Start: 2021-02-10 | End: 2021-02-12 | Stop reason: HOSPADM

## 2021-02-10 RX ADMIN — ALLOPURINOL 300 MG: 300 TABLET ORAL at 09:51

## 2021-02-10 RX ADMIN — Medication 2 PUFF: at 12:23

## 2021-02-10 RX ADMIN — ASPIRIN 81 MG: 81 TABLET, FILM COATED ORAL at 09:51

## 2021-02-10 RX ADMIN — HEPARIN SODIUM 10 UNITS/KG/HR: 10000 INJECTION, SOLUTION INTRAVENOUS at 16:23

## 2021-02-10 RX ADMIN — Medication 2 PUFF: at 21:19

## 2021-02-10 RX ADMIN — METOPROLOL TARTRATE 50 MG: 50 TABLET, FILM COATED ORAL at 10:57

## 2021-02-10 RX ADMIN — Medication 10 ML: at 09:51

## 2021-02-10 RX ADMIN — METOPROLOL TARTRATE 50 MG: 50 TABLET, FILM COATED ORAL at 20:32

## 2021-02-10 RX ADMIN — Medication 2 PUFF: at 09:55

## 2021-02-10 RX ADMIN — KETOROLAC TROMETHAMINE 15 MG: 15 INJECTION, SOLUTION INTRAMUSCULAR; INTRAVENOUS at 09:50

## 2021-02-10 RX ADMIN — BENZONATATE 100 MG: 100 CAPSULE ORAL at 17:39

## 2021-02-10 RX ADMIN — DULOXETINE HYDROCHLORIDE 60 MG: 60 CAPSULE, DELAYED RELEASE ORAL at 09:51

## 2021-02-10 RX ADMIN — PANTOPRAZOLE SODIUM 40 MG: 40 TABLET, DELAYED RELEASE ORAL at 07:00

## 2021-02-10 ASSESSMENT — PAIN SCALES - GENERAL: PAINLEVEL_OUTOF10: 0

## 2021-02-10 NOTE — H&P
Medication Sig Start Date End Date Taking?  Authorizing Provider   omeprazole (PRILOSEC) 20 MG delayed release capsule Take one every morning before breakfast 1/28/21   Bin Nieves MD   allopurinol (ZYLOPRIM) 300 MG tablet Take 1 tablet by mouth daily To protect kidneys and prevent joint pain from gout 1/26/21   Bin Nieves MD   pitavastatin (LIVALO) 4 MG TABS tablet TAKE 1 TABLET NIGHTLY 1/25/21   Bin Nieves MD   meclizine (ANTIVERT) 12.5 MG tablet Take 1-2 tablets by mouth daily as needed for Dizziness 1/25/21   Bin Nieves MD   valsartan (DIOVAN) 320 MG tablet Take 1 tablet by mouth daily 1/25/21   Bin Nieves MD   metoprolol tartrate (LOPRESSOR) 50 MG tablet TAKE 1 TABLET TWICE A DAY 12/10/20   Ivette Ellis MD   DULoxetine (CYMBALTA) 60 MG extended release capsule TAKE 1 CAPSULE DAILY FOR ARTHRITIS PAIN AND MOOD 12/9/20   Bin Nieves MD   fluticasone (FLONASE) 50 MCG/ACT nasal spray USE 1 SPRAY NASALLY DAILY 3/4/20   Bin Nieves MD   acetaminophen (APAP EXTRA STRENGTH) 500 MG tablet Take 2 tablets by mouth 3 times daily as needed for Pain 9/12/19   Bin Nieves MD   albuterol sulfate HFA (PROAIR HFA) 108 (90 Base) MCG/ACT inhaler Inhale 2 puffs into the lungs every 4 hours as needed for Wheezing or Shortness of Breath (cough, shortness breath or wheezing.) 3/7/19   Bin Nieves MD   promethazine (PHENERGAN) 25 MG tablet Take 0.5 tablets by mouth every 6 hours as needed for Nausea  Patient not taking: Reported on 9/25/2020 4/6/18   Bin Nieves MD   Multiple Vitamins-Minerals (THERAPEUTIC MULTIVITAMIN-MINERALS) tablet Take 1 tablet by mouth daily    Historical Provider, MD   vitamin B-12 (CYANOCOBALAMIN) 1000 MCG tablet Take 2 tablets by mouth daily 3/18/17   Bin Nieves MD   aspirin 81 MG tablet Take 81 mg by mouth daily    Historical Provider, MD nitroGLYCERIN (NITROSTAT) 0.4 MG SL tablet Place 1 tablet under the tongue every 5 minutes as needed for Chest pain  Patient not taking: Reported on 1/25/2021 5/10/16   Jalene Brittle, MD   Omega-3 Krill Oil 300 MG CAPS Take 1 tablet by mouth daily     Historical Provider, MD       Allergies:  Pcn [penicillins], Tetanus toxoids, and Statins    Social History:      TOBACCO:   reports that she has been smoking cigarettes. She has a 60.00 pack-year smoking history. She has never used smokeless tobacco.  ETOH:   reports no history of alcohol use. DRUGS:  reports no history of drug use. Family History:      Reviewed in detail positive as follows:        Problem Relation Age of Onset    Deep Vein Thrombosis Mother     Esophageal Cancer Son     Cancer Other         very strong in family    Seizures Daughter        REVIEW OF SYSTEMS:   Pertinent positives as noted in the HPI. All other systems reviewed and negative. PHYSICAL EXAM PERFORMED:    BP (!) 115/54   Pulse 77   Temp 98.1 °F (36.7 °C) (Oral)   Resp 20   Ht 5' (1.524 m)   Wt 226 lb (102.5 kg)   SpO2 94%   BMI 44.14 kg/m²     General appearance:  Awake, alert, no apparent distress  HEENT:  Normocephalic, atraumatic without obvious deformity. PERRL. EOM intact. Conjunctivae/corneas clear. Neck: Supple, with full range of motion. No JVD. Trachea midline. Respiratory:  Clear to auscultation bilaterally without rales, wheezes, or rhonchi. Normal respiratory effort. Cardiovascular:  Regular rate and rhythm without murmurs, rubs or gallops. Abdomen: Soft, NT, ND, without rebound or guarding. Normal bowel sounds. Extremities:  No clubbing, cyanosis, or edema bilaterally. Full range of motion without deformity. +2 palpable pulses, equal bilaterally. Capillary refill brisk,< 3 seconds   Skin: No rashes or lesions. Warm/dry. Neurologic:  Neurovascularly intact without any focal sensory/motor deficits. Cranial nerves: II-XII intact, grossly non-focal. Alert and oriented x 3. Normal speech. Psychiatric:  Thought content appropriate, normal insight. Labs:   CBC   Recent Labs     02/09/21  1734   WBC 15.7*   HGB 12.1   HCT 37.3           RENAL  Recent Labs     02/09/21  1734      K 4.3      CO2 25   BUN 18   CREATININE 1.0       LFTS  Recent Labs     02/09/21  1734   AST 13*   ALT 11   BILITOT 0.6   ALKPHOS 124       COAG  No results for input(s): INR in the last 72 hours. CARDIAC ENZYMES  Recent Labs     02/09/21  1734   TROPONINI <0.01       LIPIDS  Cholesterol, Total   Date/Time Value Ref Range Status   09/25/2020 02:16  0 - 199 mg/dL Final     Triglycerides   Date/Time Value Ref Range Status   09/25/2020 02:16  (H) 0 - 150 mg/dL Final     HDL   Date/Time Value Ref Range Status   09/25/2020 02:16 PM 47 40 - 60 mg/dL Final     LDL Calculated   Date/Time Value Ref Range Status   09/25/2020 02:16  (H) <100 mg/dL Final         Radiology:     CT CHEST PULMONARY EMBOLISM W CONTRAST   Final Result   Single subsegmental acute pulmonary embolus along the pulmonary artery to the   right lower lobe extending distally. Mildly dilated and atherosclerotic thoracic aorta with no aneurysm or   dissection. Status post CABG surgery with no mediastinal mass or adenopathy. Mild bibasilar atelectasis or early infiltrates and associated tiny bibasilar   pleural effusions which is more prominent on the right. The findings were called to Dr. Mor Larson at the time of the interpretation at   8:15 p.m.         CT ABDOMEN PELVIS W IV CONTRAST Additional Contrast? None   Final Result   No acute inflammatory abnormality in the abdomen or pelvis is identified. XR CHEST PORTABLE   Final Result   Postop changes along the sternum with stable cardiomegaly. Minimal bibasilar discoid atelectasis or scarring which is much less   prominent. EKG:   Read by ED physician in the absence of a cardiologist:  Rate: normal with a rate of 68  Rhythm: sinus  Axis: left deviation  Intervals: normal ME, narrow QRS, normal QTc  ST segments: no ST elevations or depressions  T waves: no abnormal inversions  Non-specific T wave changes: present  Prior EKG comparison: EKG dated 3/13/15 is significantly different due to nonspecific changes      ASSESSMENT/PLAN:    Pulmonary embolism   Heparin gtt initiated in ED. Will continue  Monitor on telemetry  BLE venous doppler ordered  Patient denies history of the following risk factors: calf pain, estrogen replacement therapy, history of DVT or PE, history of malignancy, oral contraceptive use, prolonged stay in bed, recent limb injury or fracture, recent surgery  Patient has a family history of DVT (mother)    Pleuritic chest pain   Due to above  Treat symptomatically    Cough  COVID-19 test results pending. Droplet Plus Isolation  Tessalon    Leukocytosis  ? Reactive  Patient afebrile  Blood cultures and UA/C&S ordered  CT chest notable for atelectasis vs PNA. Procalcitonin WNL  Recheck in AM    CAD  Continue home ASA, BB, statin    HTN  Continue home metoprolol and valsartan    Morbid obesity due to excess calories (Body mass index is 14.23 kg/m².)   Complicating assessment and treatment. Obesity places the patient at risk for multiple co-morbidities as well as early death and may be contributing to the patient's presentation. Supportive care. Encourage therapeutic lifestyle changes.      GERD  PPI      DVT prophylaxis: Heparin gtt  Probiotic if on abx: N/A    Diet: DIET GENERAL;  Code Status: Full Code    Consults:  IP CONSULT TO HOSPITALIST    Disposition: Admit to Inpatient   ELOS: Greater than two midnights due to medical therapy     Astrid Briscoe PA-C Thank you Samson Birch MD for the opportunity to be involved in this patient's care. If you have any questions or concerns please feel free to contact me at [phone].

## 2021-02-10 NOTE — ED NOTES
Earlier during assessment and medication administration, pt was alerted that she would need a urine sample. Writer asked pt if she would be able to urinate on a bedpan to which patient replied \"Yeah, I think I could\". Writer asked patient if she needed to use the restroom and pt states she thinks she already went in her brief. Urine sample not obtained.      Minda Osgood, RN  02/09/21 6242

## 2021-02-10 NOTE — PROCEDURES
Per the vascular lab protocol, the chart has been reviewed and the study is on hold at this time due to no contraindications to anticoagulation. This will be re-evaluated when COVID results are available. If the ordering physician believes this study will change the plan of care, please contact the vascular lab at [phone] to connect you with the reading vascular physician for review/discussion.

## 2021-02-10 NOTE — PROGRESS NOTES
To room 5571 via cart from ED. Oriented to room and call light system. Assessment complete. Up in chair. Call light in reach. Will continue to monitor.

## 2021-02-10 NOTE — TELEPHONE ENCOUNTER
Patients dtr valentino called to make dr. Audelia Bumpers know that patient is in hospital for a blood clot in her lungs.

## 2021-02-10 NOTE — PROGRESS NOTES
Hospitalist Progress Note      PCP: Rosa Guaman MD    Date of Admission: 2/9/2021    Chief Complaint: Pain    Hospital Course: [de-identified] y.o. female who presented to ED with complaint of right flank pain radiating up to the right scapula and to the right breast.  She reports pain began acutely last night unprovoked. She describes pain as squeezing. She reports associated shortness of breath and cough. She denies fever, dizziness, abdominal pain, nausea, vomiting, diarrhea, constipation, urinary symptoms. She denies any personal history of blood clots. She does report a family history of blood clots stating her mom had a blood clot in her leg. She denies any recent surgery, limb injury or fracture, or prolonged stay in bed. Subjective: Seen and examined at bedside. States breathing is easy. Chest pain improving. Medications:  Reviewed    Infusion Medications    heparin (PORCINE) Infusion 10 Units/kg/hr (02/10/21 4837)     Scheduled Medications    aspirin  81 mg Oral Daily    DULoxetine  60 mg Oral Daily    metoprolol tartrate  50 mg Oral BID    pantoprazole  40 mg Oral QAM AC    pitavastatin  4 mg Oral Nightly    valsartan  320 mg Oral Daily    sodium chloride flush  10 mL Intravenous 2 times per day    allopurinol  300 mg Oral Daily     PRN Meds: ketorolac, benzonatate, heparin (porcine), heparin (porcine), acetaminophen, albuterol sulfate HFA, meclizine, sodium chloride flush, polyethylene glycol, ondansetron      Intake/Output Summary (Last 24 hours) at 2/10/2021 1604  Last data filed at 2/10/2021 1303  Gross per 24 hour   Intake 480 ml   Output    Net 480 ml       Physical Exam Performed:    /72   Pulse 73   Temp 97.9 °F (36.6 °C) (Oral)   Resp 18   Ht 5' (1.524 m)   Wt 226 lb (102.5 kg)   SpO2 99%   BMI 44.14 kg/m²     General appearance: No apparent distress, appears stated age and cooperative. HEENT: Pupils equal, round, and reactive to light. Conjunctivae/corneas clear. Neck: Supple, with full range of motion. No jugular venous distention. Trachea midline. Respiratory:  Normal respiratory effort. Clear to auscultation, bilaterally without Rales/Wheezes/Rhonchi. Cardiovascular: Regular rate and rhythm with normal S1/S2 without murmurs, rubs or gallops. Abdomen: Soft, non-tender, non-distended with normal bowel sounds. Musculoskeletal: No clubbing, cyanosis or edema bilaterally. Full range of motion without deformity. Skin: Skin color, texture, turgor normal.  No rashes or lesions. Neurologic:  Neurovascularly intact without any focal sensory/motor deficits. Cranial nerves: II-XII intact, grossly non-focal.  Psychiatric: Alert and oriented, thought content appropriate, normal insight  Capillary Refill: Brisk,< 3 seconds   Peripheral Pulses: +2 palpable, equal bilaterally       Labs:   Recent Labs     02/09/21  1734 02/10/21  0354   WBC 15.7* 15.8*   HGB 12.1 11.8*   HCT 37.3 36.1    242     Recent Labs     02/09/21  1734 02/10/21  0354    137   K 4.3 4.1    104   CO2 25 23   BUN 18 17   CREATININE 1.0 1.0   CALCIUM 9.4 9.4     Recent Labs     02/09/21  1734   AST 13*   ALT 11   BILITOT 0.6   ALKPHOS 124     No results for input(s): INR in the last 72 hours. Recent Labs     02/09/21  1734   TROPONINI <0.01       Urinalysis:      Lab Results   Component Value Date    NITRU Negative 12/08/2017    WBCUA 3 12/08/2017    BACTERIA 1+ 12/08/2017    RBCUA 4 12/08/2017    BLOODU neg 03/13/2020    BLOODU Negative 12/08/2017    SPECGRAV 1.015 03/13/2020    SPECGRAV 1.020 12/08/2017    GLUCOSEU neg 03/13/2020    GLUCOSEU Negative 12/08/2017       Radiology:  CT CHEST PULMONARY EMBOLISM W CONTRAST   Final Result   Single subsegmental acute pulmonary embolus along the pulmonary artery to the   right lower lobe extending distally. Mildly dilated and atherosclerotic thoracic aorta with no aneurysm or   dissection. Status post CABG surgery with no mediastinal mass or adenopathy. Mild bibasilar atelectasis or early infiltrates and associated tiny bibasilar   pleural effusions which is more prominent on the right. The findings were called to Dr. John Sanford at the time of the interpretation at   8:15 p.m.         CT ABDOMEN PELVIS W IV CONTRAST Additional Contrast? None   Final Result   No acute inflammatory abnormality in the abdomen or pelvis is identified. XR CHEST PORTABLE   Final Result   Postop changes along the sternum with stable cardiomegaly. Minimal bibasilar discoid atelectasis or scarring which is much less   prominent. VL Extremity Venous Bilateral    (Results Pending)           Assessment/Plan:    Active Hospital Problems    Diagnosis    Pulmonary embolism without acute cor pulmonale (HCC) [I26.99]     Pulmonary embolism   Heparin gtt initiated in ED. Will continue  Monitor on telemetry  BLE venous doppler ordered  Patient denies history of the following risk factors: calf pain, estrogen replacement therapy, history of DVT or PE, history of malignancy, oral contraceptive use, prolonged stay in bed, recent limb injury or fracture, recent surgery  Patient has a family history of DVT (mother)     Pleuritic chest pain   Due to above  Treat symptomatically     Cough  COVID-19 test results pending. Droplet Plus Isolation  Tessalon     Leukocytosis  ? Reactive  Patient afebrile  Blood cultures and UA/C&S ordered  CT chest notable for atelectasis vs PNA. Procalcitonin WNL  Recheck in AM     CAD  Continue home ASA, BB, statin     HTN  Continue home metoprolol and valsartan     Morbid obesity due to excess calories (Body mass index is 18.94 kg/m².)   Complicating assessment and treatment. Obesity places the patient at risk for multiple co-morbidities as well as early death and may be contributing to the patient's presentation. Supportive care. Encourage therapeutic lifestyle changes.      GERD  PPI    DVT Prophylaxis: Heparin  Diet: DIET GENERAL;  Code Status: Full Code    Electronically signed by Durga Hinojosa MD on 2/10/2021 at 4:04 PM

## 2021-02-10 NOTE — CONSULTS
[facility]   CONSULT NOTE    2/10/2021 9:00 AM    Patient Information: Selma Koenig   Date of Admit:  2/9/2021  Primary Care Physician:  Lucy Dang MD  Requesting Physician:  Devi Keys MD    Reason for consult:   Evaluation and recommendations for pulmonary embolism    Chief complaint:    Chief Complaint   Patient presents with    Flank Pain     Pt reports right sided flank pain started last night, pt also reports shortness of breath, cough. Also feels discomfort in esophagus. Arrived via EMS. History of Present Illness:  Selma Koenig is a [de-identified] y.o. female on Devi Keys MD service who was admitted on 2/9/2021 for back pain and shortness of breath. CTPA revealed acute PE in the right lower lobe. She denies personal history of blood clots. She reports that her mother and sister have a history of blood clots. She states that her sister has a blood clotting disorder, she does not know what mutation. The patient denies recent travel, surgery and injury. She states she is not very active at home and spends most of the day sitting on the couch. She has a history of basal cell carcinoma and malignant melanoma of the skin that was surgically removed. Past Medical History:     has a past medical history of Albinoidism ([address] CAD (coronary artery disease), COPD (chronic obstructive pulmonary disease) ([address] Depression, Diabetes mellitus ([address] Diet-controlled diabetes mellitus ([address] Hearing impaired, Hiatal hernia, Hyperlipidemia, Hypertension, Kidney stone, Malignant melanoma ([address] Osteoarthritis, Otosclerosis, Pneumonia, and Stented coronary artery.      Past Surgical History:    Past Surgical History:   Procedure Laterality Date    COLONOSCOPY      CORONARY ANGIOPLASTY WITH STENT PLACEMENT      CORONARY ARTERY BYPASS GRAFT  03/17/15    Dr. Delia Ramsey - x 3 LIMA-LAD, SV-OM2, SV-RCA    ENDOSCOPY, COLON, DIAGNOSTIC      KNEE SURGERY      SKIN BIOPSY arm,back, upper left arm    SKIN CANCER EXCISION  <2000    Left upper arm;  Melanoma    STAPEDES SURGERY  [id number]    Dr. Gilberto Harrison        Current Medications:     aspirin  81 mg Oral Daily    DULoxetine  60 mg Oral Daily    metoprolol tartrate  50 mg Oral BID    pantoprazole  40 mg Oral QAM AC    pitavastatin  4 mg Oral Nightly    valsartan  320 mg Oral Daily    sodium chloride flush  10 mL Intravenous 2 times per day    allopurinol  300 mg Oral Daily       Allergies: Allergies   Allergen Reactions    Pcn [Penicillins] Hives    Tetanus Toxoids Itching    Statins Rash     Rash with Lipitor, Crestor, Zocor;  Probably pravastatin. Social History:    reports that she has been smoking cigarettes. She has a 60.00 pack-year smoking history. She has never used smokeless tobacco. She reports that she does not drink alcohol or use drugs. Family History:     family history includes Cancer in an other family member; Deep Vein Thrombosis in her mother; Esophageal Cancer in her son; Seizures in her daughter. ROS:      Conscious alert oriented  HEENT: No pallor or scleral icterus. Oral mucosa: No mucositis. No thrush. Neck: Supple, no lymphadenopathy. No thyromegaly  Lungs: Expiratory wheezes. Respiratory efforts normal  CVS: S1-S2 normal.  No murmurs or gallops heard. Abdomen: Soft, bowel sounds are heard. No tenderness. Neuro: No focal deficits. No cranial nerve palsies. Alert and oriented. Skin: No rashes, petechiae, ecchymosis. Spine: No tenderness or deformity noted. Extremities: No edema, tenderness, erythema.   Lymphatics: No LAD    PHYSICAL EXAM:    Vitals:  Vitals:    02/10/21 0455   BP: (!) 146/75   Pulse: 66   Resp: 20   Temp: 98.1 °F (36.7 °C)   SpO2: 95%        Intake/Output Summary (Last 24 hours) at 2/10/2021 0900  Last data filed at 2/10/2021 0844  Gross per 24 hour   Intake 240 ml   Output    Net 240 ml      Wt Readings from Last 3 Encounters: 02/09/21 226 lb (102.5 kg)   09/25/20 225 lb (102.1 kg)   07/09/20 223 lb (101.2 kg)        General appearance: Appears comfortable. Well nourished  Eyes: Sclera clear, pupils equal  ENT: Moist mucus membranes, no thrush  Neck: Trachea midline, symmetrical  Cardiovascular: Regular rhythm, normal S1, S2. No murmur, gallop, rub. No edema in  lower extremities  Respiratory: Clear to auscultation bilaterally. No wheeze. Good inspiratory effort  Gastrointestinal: Abdomen soft, not tender, not distended, normal bowel sounds  Musculoskeletal: No cyanosis in digits, warm extremities  Neurologic: Cranial nerves grossly intact, no motor or speech deficits. Psychiatric: Normal affect. Alert and oriented to time, place and person.   Skin: Warm, dry, normal turgor, no rash    DATA:  CBC:   Lab Results   Component Value Date    WBC 15.8 02/10/2021    RBC 4.17 02/10/2021    HGB 11.8 02/10/2021    HCT 36.1 02/10/2021    MCV 86.7 02/10/2021    MCH 28.3 02/10/2021    MCHC 32.7 02/10/2021    RDW 15.4 02/10/2021     02/10/2021    MPV 8.0 02/10/2021     BMP:  Lab Results   Component Value Date     02/10/2021    K 4.1 02/10/2021     02/10/2021    CO2 23 02/10/2021    BUN 17 02/10/2021    CREATININE 1.0 02/10/2021    CALCIUM 9.4 02/10/2021    GFRAA >60 02/10/2021    GFRAA >60 02/03/2013    LABGLOM 53 02/10/2021    GLUCOSE 123 02/10/2021     Magnesium:   Lab Results   Component Value Date    MG 2.10 09/25/2020    MG 2.10 06/06/2019    MG 2.00 12/22/2016     LIVER PROFILE:   Recent Labs     02/09/21  1734   AST 13*   ALT 11   LIPASE 14.0   BILITOT 0.6   ALKPHOS 124     PT/INR:    Lab Results   Component Value Date    PROTIME 10.6 03/23/2015    PROTIME 9.8 03/16/2015    INR 0.98 03/23/2015    INR 0.91 03/16/2015     IMAGING:    Narrative   EXAMINATION:   CTA OF THE CHEST 2/9/2021 7:10 pm       TECHNIQUE:   CTA of the chest was performed after the administration of intravenous contrast.  Multiplanar reformatted images are provided for review.  MIP   images are provided for review. Dose modulation, iterative reconstruction,   and/or weight based adjustment of the mA/kV was utilized to reduce the   radiation dose to as low as reasonably achievable.       COMPARISON:   None.       HISTORY:   ORDERING SYSTEM PROVIDED HISTORY: R sided cough/SOB and R sided chest pain   TECHNOLOGIST PROVIDED HISTORY:   Reason for exam:->R sided cough/SOB and R sided chest pain   Decision Support Exception->Emergency Medical Condition (MA)   Reason for Exam: Flank Pain (Pt reports right sided flank pain started last   night, pt also reports shortness of breath, cough. Also feels discomfort in   esophagus. Arrived via EMS       FINDINGS:   Pulmonary Arteries: The pulmonary arteries are adequately opacified for   evaluation.  There is motion artifact slightly limited exam. Mick Mauro is a   small subsegmental filling defect in the pulmonary artery to the right lower   lobe posteriorly and extending distally into the smaller branch vessels.       Mediastinum: No evidence of mediastinal lymphadenopathy.  The heart and   pericardium demonstrate no acute abnormality.  There is moderate calcified   plaque throughout the aorta which is mildly dilated throughout with no   aneurysm or dissection.  No mediastinal mass or adenopathy is seen.       Lungs/pleura: There are tiny bibasilar pleural effusions with bibasilar   opacities posteriorly which is more prominent on the right. Mick Mauro is no   pulmonary nodule or mass.       Upper Abdomen: Limited images of the upper abdomen are unremarkable.       Soft Tissues/Bones:  There are moderate degenerative changes in the spine with   some mild compression deformities along the upper thoracic region with no   displaced fragment.  There postop changes along the mediastinum and sternum.           Impression   Single subsegmental acute pulmonary embolus along the pulmonary artery to the GI/Bowel: There is a small sliding hiatal hernia.  No dilated loops of small   bowel are identified.  The appendix is not confidently identified, similar to   the previous study.  There is no focal mural thickening of the colon.       Pelvis: Urinary bladder is unremarkable.  Uterus is absent.       Peritoneum/Retroperitoneum: There is moderate aortoiliac calcification,   without aneurysm.  No adenopathy is seen.       Bones/Soft Tissues: No acute osseous injury is appreciated.  The abdominal   wall is unremarkable.           Impression   No acute inflammatory abnormality in the abdomen or pelvis is identified.           IMPRESSION/RECOMMENDATIONS:    Active Problems:    Pulmonary embolism without acute cor pulmonale (HCC)  Resolved Problems:    * No resolved hospital problems. [de-identified] year old female with a history of Albinism, CAD, COPD, DMII, HTN, hyperlipidemia and obesity. She is admitted with suspected COVID-19. She has:      Pulmonary embolism  -May be provoked due to inactivity.  -Will check partial hypercoagulable workup due to family history of VTE.  -Dopplers pending.  -Continue heparin drip. This plan was discussed with the patient and he/she verbalized understanding. Thank you for allowing us to participate in the care of this patient. Gypsy Magaña, [facility]  [facility]  [phone]    Patient seen and examined. Agree with above. Will obtain limited hypercoagulable w/u  OK for DOAC. Will need lifelong anticoagulation.     Janel Jimenez MD

## 2021-02-10 NOTE — CARE COORDINATION
Cm reviewed chart for d/c planning. Pt on 2 liters of oxygen, covid pending, on heparin gtt. No therapy ordered at this time.      Barbara Lewis RN, BSN  [phone]

## 2021-02-10 NOTE — PROGRESS NOTES
Notified Dr. Lottie Guerra that patient's BP is 106/68, HR 71. Received order to give morning dose of metoprolol then recheck BP in a few hours before giving Valsartan dose.

## 2021-02-11 LAB
APTT: 47.6 SEC (ref 24.2–36.2)
APTT: 50.1 SEC (ref 24.2–36.2)
HCT VFR BLD CALC: 34.1 % (ref 36–48)
HEMOGLOBIN: 11.2 G/DL (ref 12–16)
MCH RBC QN AUTO: 29 PG (ref 26–34)
MCHC RBC AUTO-ENTMCNC: 32.8 G/DL (ref 31–36)
MCV RBC AUTO: 88.4 FL (ref 80–100)
PDW BLD-RTO: 15.3 % (ref 12.4–15.4)
PLATELET # BLD: 232 K/UL (ref 135–450)
PMV BLD AUTO: 7.8 FL (ref 5–10.5)
RBC # BLD: 3.86 M/UL (ref 4–5.2)
WBC # BLD: 14.3 K/UL (ref 4–11)

## 2021-02-11 PROCEDURE — 6370000000 HC RX 637 (ALT 250 FOR IP): Performed by: PHYSICIAN ASSISTANT

## 2021-02-11 PROCEDURE — 6360000002 HC RX W HCPCS: Performed by: PHYSICIAN ASSISTANT

## 2021-02-11 PROCEDURE — 85027 COMPLETE CBC AUTOMATED: CPT

## 2021-02-11 PROCEDURE — 85730 THROMBOPLASTIN TIME PARTIAL: CPT

## 2021-02-11 PROCEDURE — 93970 EXTREMITY STUDY: CPT

## 2021-02-11 PROCEDURE — 2700000000 HC OXYGEN THERAPY PER DAY

## 2021-02-11 PROCEDURE — 2580000003 HC RX 258: Performed by: PHYSICIAN ASSISTANT

## 2021-02-11 PROCEDURE — 36415 COLL VENOUS BLD VENIPUNCTURE: CPT

## 2021-02-11 PROCEDURE — 1200000000 HC SEMI PRIVATE

## 2021-02-11 PROCEDURE — 6370000000 HC RX 637 (ALT 250 FOR IP): Performed by: INTERNAL MEDICINE

## 2021-02-11 PROCEDURE — 94760 N-INVAS EAR/PLS OXIMETRY 1: CPT

## 2021-02-11 RX ORDER — CODEINE PHOSPHATE AND GUAIFENESIN 10; 100 MG/5ML; MG/5ML
5 SOLUTION ORAL EVERY 4 HOURS PRN
Status: DISCONTINUED | OUTPATIENT
Start: 2021-02-11 | End: 2021-02-12 | Stop reason: HOSPADM

## 2021-02-11 RX ADMIN — APIXABAN 10 MG: 5 TABLET, FILM COATED ORAL at 11:06

## 2021-02-11 RX ADMIN — METOPROLOL TARTRATE 50 MG: 50 TABLET, FILM COATED ORAL at 20:42

## 2021-02-11 RX ADMIN — METOPROLOL TARTRATE 50 MG: 50 TABLET, FILM COATED ORAL at 08:22

## 2021-02-11 RX ADMIN — PANTOPRAZOLE SODIUM 40 MG: 40 TABLET, DELAYED RELEASE ORAL at 05:53

## 2021-02-11 RX ADMIN — VALSARTAN 320 MG: 160 TABLET, FILM COATED ORAL at 08:22

## 2021-02-11 RX ADMIN — APIXABAN 10 MG: 5 TABLET, FILM COATED ORAL at 20:42

## 2021-02-11 RX ADMIN — ASPIRIN 81 MG: 81 TABLET, FILM COATED ORAL at 08:22

## 2021-02-11 RX ADMIN — BENZONATATE 100 MG: 100 CAPSULE ORAL at 01:20

## 2021-02-11 RX ADMIN — HEPARIN SODIUM 4100 UNITS: 1000 INJECTION INTRAVENOUS; SUBCUTANEOUS at 05:46

## 2021-02-11 RX ADMIN — ACETAMINOPHEN 1000 MG: 500 TABLET ORAL at 01:20

## 2021-02-11 RX ADMIN — GUAIFENESIN AND CODEINE PHOSPHATE 5 ML: 100; 10 SOLUTION ORAL at 02:37

## 2021-02-11 RX ADMIN — DULOXETINE HYDROCHLORIDE 60 MG: 60 CAPSULE, DELAYED RELEASE ORAL at 08:22

## 2021-02-11 RX ADMIN — ALLOPURINOL 300 MG: 300 TABLET ORAL at 08:23

## 2021-02-11 RX ADMIN — Medication 10 ML: at 20:43

## 2021-02-11 NOTE — PROGRESS NOTES
Routine vital signs stable. O2 sats 98% on 2L NC. Patients head to toe assessment completed. A & O x4. Scheduled medications given per MAR. Tolerated well. No rate change for Heparin gtt. No signs of distress. Patient denies any pain at the moment Bed alarm engaged. Call light within reach. Bed in low position. Patient resting in bed.

## 2021-02-11 NOTE — PROGRESS NOTES
Report called to [facility]. They are aware of her hearing aides and  and her eliquis for home in bag. They will also sent tele back to us.

## 2021-02-11 NOTE — PROGRESS NOTES
CLINICAL PHARMACY NOTE: MEDS TO [address] Select Patient?: Yes  Total # of Prescriptions Filled: 1   The following medications were delivered to the patient:  · Eliquis starter pack 5  Total # of Interventions Completed: 0  Time Spent (min): 15    Additional Documentation:  Delivered to Patient  Nelida Gleason CPhT

## 2021-02-11 NOTE — CARE COORDINATION
CM reviewed chart for d/c planning. Pt is Covid negative, remains on heparin gtt. Therapy evals pending at this time.     Barbara Lewis RN, BSN  [phone]

## 2021-02-11 NOTE — PROGRESS NOTES
Daughter [place] aware of pt transfer to [phone] and she was given update. Pt transferred via [facility].

## 2021-02-12 VITALS
HEIGHT: 60 IN | TEMPERATURE: 98.5 F | SYSTOLIC BLOOD PRESSURE: 112 MMHG | WEIGHT: 225.53 LBS | OXYGEN SATURATION: 95 % | RESPIRATION RATE: 18 BRPM | BODY MASS INDEX: 44.28 KG/M2 | HEART RATE: 61 BPM | DIASTOLIC BLOOD PRESSURE: 68 MMHG

## 2021-02-12 LAB
ANION GAP SERPL CALCULATED.3IONS-SCNC: 8 MMOL/L (ref 3–16)
ANISOCYTOSIS: ABNORMAL
ANTICARDIOLIPIN IGA ANTIBODY: 1 APL (ref 0–11)
ANTICARDIOLIPIN IGG ANTIBODY: 2 GPL (ref 0–14)
BANDED NEUTROPHILS RELATIVE PERCENT: 4 % (ref 0–7)
BASOPHILS ABSOLUTE: 0 K/UL (ref 0–0.2)
BASOPHILS RELATIVE PERCENT: 0 %
BETA-2 GLYCOPROTEIN 1 IGG ANTIBODY: 0 SGU (ref 0–20)
BETA-2 GLYCOPROTEIN 1 IGM ANTIBODY: 2 SMU (ref 0–20)
BUN BLDV-MCNC: 14 MG/DL (ref 7–20)
CALCIUM SERPL-MCNC: 9.2 MG/DL (ref 8.3–10.6)
CARDIOLIPIN AB IGM: 10 MPL (ref 0–12)
CHLORIDE BLD-SCNC: 104 MMOL/L (ref 99–110)
CO2: 23 MMOL/L (ref 21–32)
CREAT SERPL-MCNC: 1 MG/DL (ref 0.6–1.2)
D DIMER: 918 NG/ML DDU (ref 0–229)
EOSINOPHILS ABSOLUTE: 0.4 K/UL (ref 0–0.6)
EOSINOPHILS RELATIVE PERCENT: 3 %
GFR AFRICAN AMERICAN: >60
GFR NON-AFRICAN AMERICAN: 53
GLUCOSE BLD-MCNC: 104 MG/DL (ref 70–99)
HCT VFR BLD CALC: 32 % (ref 36–48)
HEMOGLOBIN: 10.4 G/DL (ref 12–16)
LYMPHOCYTES ABSOLUTE: 2.1 K/UL (ref 1–5.1)
LYMPHOCYTES RELATIVE PERCENT: 15 %
MAGNESIUM: 2 MG/DL (ref 1.8–2.4)
MCH RBC QN AUTO: 28.1 PG (ref 26–34)
MCHC RBC AUTO-ENTMCNC: 32.5 G/DL (ref 31–36)
MCV RBC AUTO: 86.6 FL (ref 80–100)
MONOCYTES ABSOLUTE: 1.2 K/UL (ref 0–1.3)
MONOCYTES RELATIVE PERCENT: 9 %
NEUTROPHILS ABSOLUTE: 10.1 K/UL (ref 1.7–7.7)
NEUTROPHILS RELATIVE PERCENT: 69 %
PDW BLD-RTO: 14.8 % (ref 12.4–15.4)
PHOSPHORUS: 2.7 MG/DL (ref 2.5–4.9)
PLATELET # BLD: 241 K/UL (ref 135–450)
PLATELET SLIDE REVIEW: ADEQUATE
PMV BLD AUTO: 8 FL (ref 5–10.5)
POIKILOCYTES: ABNORMAL
POLYCHROMASIA: ABNORMAL
POTASSIUM SERPL-SCNC: 3.7 MMOL/L (ref 3.5–5.1)
PROCALCITONIN: 0.09 NG/ML (ref 0–0.15)
RBC # BLD: 3.7 M/UL (ref 4–5.2)
SLIDE REVIEW: ABNORMAL
SODIUM BLD-SCNC: 135 MMOL/L (ref 136–145)
WBC # BLD: 13.8 K/UL (ref 4–11)

## 2021-02-12 PROCEDURE — 6370000000 HC RX 637 (ALT 250 FOR IP): Performed by: INTERNAL MEDICINE

## 2021-02-12 PROCEDURE — 85379 FIBRIN DEGRADATION QUANT: CPT

## 2021-02-12 PROCEDURE — 94760 N-INVAS EAR/PLS OXIMETRY 1: CPT

## 2021-02-12 PROCEDURE — 80048 BASIC METABOLIC PNL TOTAL CA: CPT

## 2021-02-12 PROCEDURE — 83735 ASSAY OF MAGNESIUM: CPT

## 2021-02-12 PROCEDURE — 36415 COLL VENOUS BLD VENIPUNCTURE: CPT

## 2021-02-12 PROCEDURE — 97530 THERAPEUTIC ACTIVITIES: CPT

## 2021-02-12 PROCEDURE — 97116 GAIT TRAINING THERAPY: CPT

## 2021-02-12 PROCEDURE — 2700000000 HC OXYGEN THERAPY PER DAY

## 2021-02-12 PROCEDURE — 97166 OT EVAL MOD COMPLEX 45 MIN: CPT

## 2021-02-12 PROCEDURE — 94680 O2 UPTK RST&XERS DIR SIMPLE: CPT

## 2021-02-12 PROCEDURE — 97161 PT EVAL LOW COMPLEX 20 MIN: CPT

## 2021-02-12 PROCEDURE — 84100 ASSAY OF PHOSPHORUS: CPT

## 2021-02-12 PROCEDURE — 2580000003 HC RX 258: Performed by: PHYSICIAN ASSISTANT

## 2021-02-12 PROCEDURE — 85025 COMPLETE CBC W/AUTO DIFF WBC: CPT

## 2021-02-12 PROCEDURE — 97535 SELF CARE MNGMENT TRAINING: CPT

## 2021-02-12 PROCEDURE — 6370000000 HC RX 637 (ALT 250 FOR IP): Performed by: PHYSICIAN ASSISTANT

## 2021-02-12 PROCEDURE — 84145 PROCALCITONIN (PCT): CPT

## 2021-02-12 RX ADMIN — METOPROLOL TARTRATE 50 MG: 50 TABLET, FILM COATED ORAL at 08:20

## 2021-02-12 RX ADMIN — Medication 10 ML: at 08:21

## 2021-02-12 RX ADMIN — PANTOPRAZOLE SODIUM 40 MG: 40 TABLET, DELAYED RELEASE ORAL at 08:20

## 2021-02-12 RX ADMIN — DULOXETINE HYDROCHLORIDE 60 MG: 60 CAPSULE, DELAYED RELEASE ORAL at 08:21

## 2021-02-12 RX ADMIN — ASPIRIN 81 MG: 81 TABLET, FILM COATED ORAL at 08:20

## 2021-02-12 RX ADMIN — APIXABAN 10 MG: 5 TABLET, FILM COATED ORAL at 08:20

## 2021-02-12 RX ADMIN — ALLOPURINOL 300 MG: 300 TABLET ORAL at 08:20

## 2021-02-12 ASSESSMENT — PAIN DESCRIPTION - LOCATION: LOCATION: BACK

## 2021-02-12 ASSESSMENT — PAIN SCALES - GENERAL: PAINLEVEL_OUTOF10: 1

## 2021-02-12 NOTE — CARE COORDINATION
Discharge Planning Assessment  RN discharge planner met with patient  and family member- daughter to discuss reason for admission, current living situation, and potential needs at the time of discharge    Demographics/Insurance verified Yes- Medicare    Current type of dwelling:  [address]    Patient from [facility]/ confirmed with:  N/A    Living arrangements:  Lives alone    Level of function/Support: Independent with  ADLs. Has a house keeper every other day    PCP:  Dr Soto Shukla    Last Visit to PCP:  1 week ago    DME: Patience Lawson with 2 wheels, cane, life alert button    Active with any community resources/agencies/skilled home care:  No  Medication compliance issues: denies    Financial issues that could impact healthcare:  None      Tentative discharge plan: home with [facility] services     Discussed and provided facilities of choice if transition to a skilled nursing facility is required at the time of discharge- No      Discussed with patient and/or family that on the day of discharge home tentative time of discharge will be between 10 AM and noon.     Transportation at the time of discharge:  Daughter

## 2021-02-12 NOTE — DISCHARGE INSTR - COC
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.9    Morbid obesity ([facility]) E66.01    Recurrent major depression in remission ([facility] [address] F33.40    Ganglion cyst of volar aspect of left wrist M67.432    Chronic kidney disease, stage III (moderate) N18.30    Hyperuricemia E79.0    Diabetes mellitus type 2, diet-controlled ([facility]) E11.9    Tobacco abuse Z72.0    Class 3 severe obesity due to excess calories with body mass index (BMI) of 40.0 to 44.9 in adult ([facility]) E66.01, Z68.41    Pulmonary embolism without acute cor pulmonale ([facility]) I26.99       Isolation/Infection:   Isolation            No Isolation          Patient Infection Status       Infection Onset Added Last Indicated Last Indicated By Review Planned Expiration Resolved Resolved By    None active    Resolved    COVID-19 Rule Out 02/09/21 02/09/21 02/09/21 COVID-19 (Ordered)   02/10/21 Rule-Out Test Resulted            Nurse Assessment:  Last Vital Signs: /68   Pulse 61   Temp 98.5 °F (36.9 °C) (Oral)   Resp 18   Ht 5' (1.524 m)   Wt 225 lb 8.5 oz (102.3 kg)   SpO2 95%   BMI 44.05 kg/m²     Last documented pain score (0-10 scale): Pain Level: 1  Last Weight:   Wt Readings from Last 1 Encounters:   02/12/21 225 lb 8.5 oz (102.3 kg)     Mental Status:  oriented and alert    IV Access:  - None    Nursing Mobility/ADLs:  Walking   Assisted  Transfer  Assisted  Bathing  Independent  Dressing  Independent  Toileting  Independent  Feeding  Independent  Med Admin  Independent  Med Delivery   whole    Wound Care Documentation and Therapy:  Pressure Ulcer 03/25/15 Heel Right blister (Active)   Number of days: 2151       Incision 03/17/15 Leg Right (Active)   Number of days: 2159        Elimination:  Continence:   · Bowel: No  · Bladder: No Urinary Catheter: None   Colostomy/Ileostomy/Ileal Conduit: No       Date of Last BM: 2/12/2021  No intake or output data in the 24 hours ending 02/12/21 1256  I/O last 3 completed shifts: In: 250 [P.O.:250]  Out: -     Safety Concerns:      At Risk for Falls    Impairments/Disabilities:      Vision and Hearing    Nutrition Therapy:  Current Nutrition Therapy:   - Oral Diet:  General    Routes of Feeding: Oral  Liquids: No Restrictions  Daily Fluid Restriction: no  Last Modified Barium Swallow with Video (Video Swallowing Test): not done    Treatments at the Time of Hospital Discharge:   Respiratory Treatments: inhaler  Oxygen Therapy:  Is not on home oxygen  Ventilator:    - No ventilator support    Rehab Therapies: Skilled Nursing, Physical Therapy and Occupational Therapy  Weight Bearing Status/Restrictions: No weight bearing restirctions  Other Medical Equipment (for information only, NOT a DME order):  walker  Other Treatments: HOME HEALTH CARE: LEVEL 3 SAFETY        -Initial home health evaluation to occur within 24-48 hours, in patient home    -Home health agency to establish plan of care for patient over 60 day period    -Medication Reconciliation    -PT/OT/Speech evaluations in home within 24-48 hours of discharge; including  -DME and home safety    -Frontload therapy 5 days, then 3x a week    -OT to evaluate if patient has [address] needs for personal care    - evaluation within 24-48 hours, includes evaluation of resources   and insurance to determine [place], [place], LTC, and Medicaid options    -PCP Visit scheduled within three to seven days of discharge       Patient's personal belongings (please select all that are sent with patient):  Glasses, Hearing Aides bilateral    RN SIGNATURE:  Electronically signed by Steve Gayle RN on 2/12/21 at 12:58 PM EST    CASE MANAGEMENT/SOCIAL WORK SECTION    Inpatient Status Date: 2/9/21    Readmission Risk Assessment Score:  Readmission Risk Risk of Unplanned Readmission:        10           Discharging to Facility/ Agency   Name: Mandi Becerra will call for Appointment  Phone: [phone]  Fax: [phone]    Dialysis Facility (if applicable)   · Name:  · Address:  · Dialysis Schedule:  · Phone:  · Fax:    / signature: Electronically signed by Nell Acosta RN on 2/12/21 at 3:16 PM EST    PHYSICIAN SECTION    Prognosis: Good    Condition at Discharge: Stable    Rehab Potential (if transferring to Rehab): Good    Recommended Labs or Other Treatments After Discharge: PT OT VNS    Physician Certification: I certify the above information and transfer of Murvin Spray  is necessary for the continuing treatment of the diagnosis listed and that she requires Home Care for less 30 days.      Update Admission H&P: No change in H&P    PHYSICIAN SIGNATURE:  Electronically signed by Ngoc Pinto MD on 2/12/21 at 1:18 PM EST

## 2021-02-12 NOTE — PROGRESS NOTES
Assessment and med pass complete. Meds passed whole with water. Changed wet brief, offered repositioning but refused, says she likes to sleep on her back. Denies needs, call light in reach, bed alarm engaged.

## 2021-02-12 NOTE — PROGRESS NOTES
PROT 6.7 02/09/2021    PROT 7.3 02/03/2013    BILITOT 0.6 02/09/2021    BILIDIR <0.2 08/11/2016    IBILI see below 08/11/2016    LABALBU 3.8 02/09/2021     LDH:  No results found for: LDH  PT/INR:    Lab Results   Component Value Date    PROTIME 10.6 03/23/2015    INR 0.98 03/23/2015     PTT:    Lab Results   Component Value Date    APTT 47.6 02/11/2021   [APTT    Current Medications  Current Facility-Administered Medications: guaiFENesin-codeine (GUAIFENESIN AC) 100-10 MG/5ML liquid 5 mL, 5 mL, Oral, Q4H PRN  [COMPLETED] apixaban (ELIQUIS) tablet 10 mg, 10 mg, Oral, Once **FOLLOWED BY** apixaban (ELIQUIS) tablet 10 mg, 10 mg, Oral, BID  ketorolac (TORADOL) injection 15 mg, 15 mg, Intravenous, Q6H PRN  benzonatate (TESSALON) capsule 100 mg, 100 mg, Oral, TID PRN  acetaminophen (TYLENOL) tablet 1,000 mg, 1,000 mg, Oral, TID PRN  albuterol sulfate  (90 Base) MCG/ACT inhaler 2 puff, 2 puff, Inhalation, Q4H PRN  aspirin EC tablet 81 mg, 81 mg, Oral, Daily  DULoxetine (CYMBALTA) extended release capsule 60 mg, 60 mg, Oral, Daily  meclizine (ANTIVERT) tablet 12.5 mg, 12.5 mg, Oral, Daily PRN  metoprolol tartrate (LOPRESSOR) tablet 50 mg, 50 mg, Oral, BID  pantoprazole (PROTONIX) tablet 40 mg, 40 mg, Oral, QAM AC  pitavastatin (LIVALO) tablet 4 mg - PATIENT SUPPLIED, 4 mg, Oral, Nightly  valsartan (DIOVAN) tablet 320 mg, 320 mg, Oral, Daily  sodium chloride flush 0.9 % injection 10 mL, 10 mL, Intravenous, 2 times per day  sodium chloride flush 0.9 % injection 10 mL, 10 mL, Intravenous, PRN  polyethylene glycol (GLYCOLAX) packet 17 g, 17 g, Oral, Daily PRN  ondansetron (ZOFRAN) injection 4 mg, 4 mg, Intravenous, Q6H PRN  allopurinol (ZYLOPRIM) tablet 300 mg, 300 mg, Oral, Daily    ASSESSMENT AND PLAN    1. Right lower lobe pulmonary embolism:    -Most likely due to inactivity  -Continue Eliquis  -Family history is positive for thrombosis  -Limited hypercoagulable work-up ordered and is pending. -We will need lifelong anticoagulation        Inocencia Thomas MD

## 2021-02-12 NOTE — CARE COORDINATION
Patient discharged on 2/12/21   to home with skilled hhc services via [address] [place] [address] discharge needs met per case management

## 2021-02-12 NOTE — PROGRESS NOTES
Home Oxygen Evaluation        Patients room air at rest saturation SpO2 94%      Patients room air saturation SpO2 91% with exertion

## 2021-02-12 NOTE — CARE COORDINATION
Patient agreeable to going home with skilled  hhc services as recommended. Has no agency of preference. The hhc referral was called [facility] liaison , awaiting for call back.

## 2021-02-12 NOTE — PROGRESS NOTES
Aware of discharge with home care. Home care orders sent to [facility]. Discharge planner notified.

## 2021-02-12 NOTE — PROGRESS NOTES
[facility]   Progress Note      2/12/2021 9:02 AM        Name: Ching Lu . Admitted: 2/9/2021    SUBJECTIVE:  She is feeling ok, she becomes short of breath then she coughs, she denies chest pains, no bleeding. Reviewed interval ancillary notes    Current Medications      guaiFENesin-codeine (GUAIFENESIN AC) 100-10 MG/5ML liquid 5 mL, Q4H PRN      apixaban (ELIQUIS) tablet 10 mg, BID      ketorolac (TORADOL) injection 15 mg, Q6H PRN      benzonatate (TESSALON) capsule 100 mg, TID PRN      acetaminophen (TYLENOL) tablet 1,000 mg, TID PRN      albuterol sulfate  (90 Base) MCG/ACT inhaler 2 puff, Q4H PRN      aspirin EC tablet 81 mg, Daily      DULoxetine (CYMBALTA) extended release capsule 60 mg, Daily      meclizine (ANTIVERT) tablet 12.5 mg, Daily PRN      metoprolol tartrate (LOPRESSOR) tablet 50 mg, BID      pantoprazole (PROTONIX) tablet 40 mg, QAM AC      pitavastatin (LIVALO) tablet 4 mg - PATIENT SUPPLIED, Nightly      valsartan (DIOVAN) tablet 320 mg, Daily      sodium chloride flush 0.9 % injection 10 mL, 2 times per day      sodium chloride flush 0.9 % injection 10 mL, PRN      polyethylene glycol (GLYCOLAX) packet 17 g, Daily PRN      ondansetron (ZOFRAN) injection 4 mg, Q6H PRN      allopurinol (ZYLOPRIM) tablet 300 mg, Daily        Objective:  /68   Pulse 61   Temp 98.5 °F (36.9 °C) (Oral)   Resp 18   Ht 5' (1.524 m)   Wt 225 lb 8.5 oz (102.3 kg)   SpO2 94%   BMI 44.05 kg/m²     Intake/Output Summary (Last 24 hours) at 2/12/2021 0902  Last data filed at 2/11/2021 1203  Gross per 24 hour   Intake 250 ml   Output    Net 250 ml      Wt Readings from Last 3 Encounters:   02/12/21 225 lb 8.5 oz (102.3 kg)   09/25/20 225 lb (102.1 kg)   07/09/20 223 lb (101.2 kg)       General appearance:  Appears comfortable  Eyes: Sclera clear. Pupils equal.  ENT: Moist oral mucosa. Trachea midline, no adenopathy. Cardiovascular: Regular rhythm, normal S1, S2. No murmur. No edema in lower extremities  Respiratory: Not using accessory muscles. Good inspiratory effort. Clear to auscultation bilaterally, no wheeze or crackles. GI: Abdomen soft, no tenderness, not distended  Musculoskeletal: No cyanosis in digits, neck supple  Neurology: CN 2-12 grossly intact. No speech or motor deficits  Psych: Normal affect. Alert and oriented in time, place and person  Skin: Warm, dry, normal turgor    Labs and Tests:  CBC:   Recent Labs     02/10/21  0354 02/11/21  0515 02/12/21  0523   WBC 15.8* 14.3* 13.8*   HGB 11.8* 11.2* 10.4*    232 241     BMP:    Recent Labs     02/09/21  1734 02/10/21  0354 02/12/21  0523    137 135*   K 4.3 4.1 3.7    104 104   CO2 25 23 23   BUN 18 17 14   CREATININE 1.0 1.0 1.0   GLUCOSE 112* 123* 104*     Hepatic:   Recent Labs     02/09/21  1734   AST 13*   ALT 11   BILITOT 0.6   ALKPHOS 124       ASSESSMENT AND PLAN    Active Problems:    Pulmonary embolism without acute cor pulmonale (HCC)  Resolved Problems:    * No resolved hospital problems. *      1. Right lower lobe pulmonary embolism:     -Most likely due to inactivity  -Continue Eliquis  -Family history is positive for thrombosis  -Limited hypercoagulable work-up is pending.  -she will need lifelong anticoagulation       Oliver Aase, CNP  [facility]    Complaints noted. Will be going home today. We will ensure outpatient follow-up in 2 weeks.     Aries Marshall MD

## 2021-02-12 NOTE — CARE COORDINATION
[facility] agency has accepted the referral  will follow for [facility] services.

## 2021-02-12 NOTE — DISCHARGE INSTR - DIET

## 2021-02-12 NOTE — PROGRESS NOTES
Eliquis Counseling Note    Santosh Montenegro was counseled on Eliquis for DVT/PE. Duration of therapy: indefinitely    Indication, duration of therapy, and signs/symptoms related to bleeding were discussed. Santosh Montenegro had opportunity to ask questions. No barriers to education were noted.     Wei Palmer, PharmD, [facility]  Clinical Pharmacist  [id number]

## 2021-02-12 NOTE — PROGRESS NOTES
[facility] home care referral. Spoke with pt and re: home care plan of care/services. Agreeable. Demographic's verified. Will follow for home care.

## 2021-02-12 NOTE — PROGRESS NOTES
Hospitalist Progress Note      PCP: Jake Escamilla MD    Date of Admission: 2/9/2021    Chief Complaint: Pain    Hospital Course: [de-identified] y.o. female who presented to ED with complaint of right flank pain radiating up to the right scapula and to the right breast.  She reports pain began acutely last night unprovoked. She describes pain as squeezing. She reports associated shortness of breath and cough. She denies fever, dizziness, abdominal pain, nausea, vomiting, diarrhea, constipation, urinary symptoms. She denies any personal history of blood clots. She does report a family history of blood clots stating her mom had a blood clot in her leg. She denies any recent surgery, limb injury or fracture, or prolonged stay in bed. Subjective: Seen and examined at bedside. States breathing is easy. Still has some chest pain with cough. Medications:  Reviewed    Infusion Medications     Scheduled Medications    apixaban  10 mg Oral BID    aspirin  81 mg Oral Daily    DULoxetine  60 mg Oral Daily    metoprolol tartrate  50 mg Oral BID    pantoprazole  40 mg Oral QAM AC    pitavastatin  4 mg Oral Nightly    valsartan  320 mg Oral Daily    sodium chloride flush  10 mL Intravenous 2 times per day    allopurinol  300 mg Oral Daily     PRN Meds: guaiFENesin-codeine, ketorolac, benzonatate, acetaminophen, albuterol sulfate HFA, meclizine, sodium chloride flush, polyethylene glycol, ondansetron      Intake/Output Summary (Last 24 hours) at 2/11/2021 1928  Last data filed at 2/11/2021 1203  Gross per 24 hour   Intake 250 ml   Output    Net 250 ml       Physical Exam Performed:    /76   Pulse 78   Temp 98.8 °F (37.1 °C) (Oral)   Resp 16   Ht 5' (1.524 m)   Wt 226 lb (102.5 kg)   SpO2 94%   BMI 44.14 kg/m²     General appearance: No apparent distress, appears stated age and cooperative. HEENT: Pupils equal, round, and reactive to light. Conjunctivae/corneas clear. Neck: Supple, with full range of motion. No jugular venous distention. Trachea midline. Respiratory:  Normal respiratory effort. Clear to auscultation, bilaterally without Rales/Wheezes/Rhonchi. Cardiovascular: Regular rate and rhythm with normal S1/S2 without murmurs, rubs or gallops. Abdomen: Soft, non-tender, non-distended with normal bowel sounds. Musculoskeletal: No clubbing, cyanosis or edema bilaterally. Full range of motion without deformity. Skin: Skin color, texture, turgor normal.  No rashes or lesions. Neurologic:  Neurovascularly intact without any focal sensory/motor deficits. Cranial nerves: II-XII intact, grossly non-focal.  Psychiatric: Alert and oriented, thought content appropriate, normal insight  Capillary Refill: Brisk,< 3 seconds   Peripheral Pulses: +2 palpable, equal bilaterally       Labs:   Recent Labs     02/09/21  1734 02/10/21  0354 02/11/21  0515   WBC 15.7* 15.8* 14.3*   HGB 12.1 11.8* 11.2*   HCT 37.3 36.1 34.1*    242 232     Recent Labs     02/09/21  1734 02/10/21  0354    137   K 4.3 4.1    104   CO2 25 23   BUN 18 17   CREATININE 1.0 1.0   CALCIUM 9.4 9.4     Recent Labs     02/09/21  1734   AST 13*   ALT 11   BILITOT 0.6   ALKPHOS 124     No results for input(s): INR in the last 72 hours. Recent Labs     02/09/21  1734   TROPONINI <0.01       Urinalysis:      Lab Results   Component Value Date    NITRU Negative 02/10/2021    WBCUA 5 02/10/2021    BACTERIA 1+ 12/08/2017    RBCUA 14 02/10/2021    BLOODU TRACE 02/10/2021    SPECGRAV >1.030 02/10/2021    GLUCOSEU Negative 02/10/2021       Radiology:  VL Extremity Venous Bilateral   Final Result      CT CHEST PULMONARY EMBOLISM W CONTRAST   Final Result   Single subsegmental acute pulmonary embolus along the pulmonary artery to the   right lower lobe extending distally. Mildly dilated and atherosclerotic thoracic aorta with no aneurysm or   dissection. Status post CABG surgery with no mediastinal mass or adenopathy. Mild bibasilar atelectasis or early infiltrates and associated tiny bibasilar   pleural effusions which is more prominent on the right. The findings were called to Dr. Kristin Stone at the time of the interpretation at   8:15 p.m.         CT ABDOMEN PELVIS W IV CONTRAST Additional Contrast? None   Final Result   No acute inflammatory abnormality in the abdomen or pelvis is identified. XR CHEST PORTABLE   Final Result   Postop changes along the sternum with stable cardiomegaly. Minimal bibasilar discoid atelectasis or scarring which is much less   prominent. Assessment/Plan:    Active Hospital Problems    Diagnosis    Pulmonary embolism without acute cor pulmonale (HCC) [I26.99]     Pulmonary embolism   Heparin gtt initiated in ED. Will continue  Monitor on telemetry  BLE venous doppler ordered  Patient denies history of the following risk factors: calf pain, estrogen replacement therapy, history of DVT or PE, history of malignancy, oral contraceptive use, prolonged stay in bed, recent limb injury or fracture, recent surgery  Patient has a family history of DVT (mother)  Heme-onc consulted, work-up signed  Patient transferred to [facility], will need lifetime anticoagulation per heme-onc     Pleuritic chest pain   Due to above  Treat symptomatically     Cough  COVID-19 test results negative. DC droplet Plus Isolation  Tessalon     Leukocytosis  ? Reactive  Patient afebrile  Blood cultures and UA/C&S ordered  We will check procalcitonin again tomorrow     CAD  Continue home ASA, BB, statin     HTN  Continue home metoprolol and valsartan     Morbid obesity due to excess calories (Body mass index is 21.12 kg/m².)   Complicating assessment and treatment. Obesity places the patient at risk for multiple co-morbidities as well as early death and may be contributing to the patient's presentation. Supportive care. Encourage therapeutic lifestyle changes.      GERD  PPI    DVT Prophylaxis: Heparin  Diet: DIET GENERAL;  Code Status: Full Code    Electronically signed by Madan Lopez MD on 2/11/2021 at 7:28 PM

## 2021-02-12 NOTE — PROGRESS NOTES
Occupational Therapy   Occupational Therapy Initial Assessment  Date: 2021   Patient Name: Kamille Parrish  MRN: [id number]     : 1940    Date of Service: 2021    Discharge Recommendations: Kamille Parrish scored a 17/24 on the AM-PAC ADL Inpatient form. Current research shows that an AM-PAC score of 18 or greater is typically associated with a discharge to the patient's home setting, however pt is more limited with LB ADLs due to pain and should continue to see improvements. Based on the patient's AM-PAC score, and their current ADL deficits, it is recommended that the patient have 2-3 sessions per week of Occupational Therapy at d/c to increase the patient's independence. At this time, this patient demonstrates the endurance and safety to discharge home with home OT and a follow up treatment frequency of 2-3x/wk. Please see assessment section for further patient specific details. If patient discharges prior to next session this note will serve as a discharge summary. Please see below for the latest assessment towards goals. HOME HEALTH CARE: LEVEL 3 SAFETY     - Initial home health evaluation to occur within 24-48 hours, in patient home   - Therapy evaluations in home within 24-48 hours of discharge; including DME and home safety   - Frontload therapy 5 days, then 3x a week   - Therapy to evaluate if patient has [address] needs for personal care   -  evaluation within 24-48 hours, includes evaluation of resources and insurance to determine AL, [place], LTC, and Medicaid options          OT Equipment Recommendations  Equipment Needed: Yes  Mobility Devices: ADL Assistive Devices  ADL Assistive Devices: Reacher    Assessment   Performance deficits / Impairments: Decreased functional mobility ; Decreased ADL status; Decreased endurance;Decreased high-level IADLs Assessment: Pt is below functional baseline and would benefit from continued OT therapy to address functional deficits and improve outcomes. Treatment Diagnosis: Pt with above deficits related to a pulmonary embolism. Prognosis: Good  Decision Making: Medium Complexity  History: Previous indpendent with ADLs. OT Education: OT Role;Energy Conservation;Plan of Care;Home Exercise Program;Transfer Training;ADL Adaptive Strategies  Patient Education: Pt verbalized understanding would benefit from repitition. Barriers to Learning: cognition  REQUIRES OT FOLLOW UP: Yes  Activity Tolerance  Activity Tolerance: Patient Tolerated treatment well;Patient limited by fatigue  Safety Devices  Safety Devices in place: Yes  Type of devices: Call light within reach; Left in chair;Chair alarm in place; All fall risk precautions in place;Gait belt  Restraints  Initially in place: No           Patient Diagnosis(es): The primary encounter diagnosis was Single subsegmental pulmonary embolism without acute cor pulmonale ([facility] A diagnosis of Shortness of breath was also pertinent to this visit. has a past medical history of Albinoidism ([facility] CAD (coronary artery disease), COPD (chronic obstructive pulmonary disease) ([address] Depression, Diabetes mellitus ([address] Diet-controlled diabetes mellitus ([facility] Hearing impaired, Hiatal hernia, Hyperlipidemia, Hypertension, Kidney stone, Malignant melanoma ([address] Osteoarthritis, Otosclerosis, Pneumonia, and Stented coronary artery. has a past surgical history that includes Coronary angioplasty with stent; Skin cancer excision (<2000); knee surgery; Stapedes surgery ([id number]); skin biopsy; Colonoscopy; Endoscopy, colon, diagnostic; Coronary artery bypass graft (03/17/15); and leo and bso (cervix removed) (1987). Treatment Diagnosis: Pt with above deficits related to a pulmonary embolism.       Restrictions  Restrictions/Precautions  Restrictions/Precautions: Fall Risk(high fall risk) Position Activity Restriction  Other position/activity restrictions: Milagros Rivero is a [de-identified] y.o. female on [facility] MD service who was admitted on 2/9/2021 for back pain and shortness of breath. CTPA revealed acute PE in the right lower lobe. She denies personal history of blood clots. She reports that her mother and sister have a history of blood clots. She states that her sister has a blood clotting disorder, she does not know what mutation. The patient denies recent travel, surgery and injury. She states she is not very active at home and spends most of the day sitting on the couch. She has a history of basal cell carcinoma and malignant melanoma of the skin that was surgically removed. Subjective   General  Chart Reviewed: Yes  Patient assessed for rehabilitation services?: Yes  Family / Caregiver Present: No  Diagnosis: Pumonary embolism  Patient Currently in Pain: Yes  Pain Assessment  Pain Level: 1  Pain Location: Back  Pain Orientation: Right  Pre Treatment Pain Screening  Intervention List: Patient able to continue with treatment  Vital Signs  Patient Currently in Pain: Yes  Social/Functional History  Social/Functional History  Lives With: Alone  Type of Home: House  Home Layout: One level  Home Access: Stairs to enter with rails  Entrance Stairs - Number of Steps: 3  Bathroom Shower/Tub: Tub/Shower unit, Shower chair with back  Bathroom Toilet: Standard  Bathroom Equipment: Grab bars around toilet  Home Equipment: [employer], Rolling walker  ADL Assistance: Independent  Homemaking Responsibilities: Yes  Ambulation Assistance: Independent  Transfer Assistance: Independent  Active : No  Leisure & Hobbies: crafts  IADL Comments: meals delivered. 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        Objective   Vision: Impaired  Vision Exceptions: Cataracts  Hearing: Exceptions to [facility] Hearing Exceptions: Bilateral hearing aid;Hard of hearing/hearing concerns    Orientation  Overall Orientation Status: Within Functional Limits  Observation/Palpation  Posture: Fair  Balance  Sitting Balance: Supervision  Standing Balance: Stand by assistance  Standing Balance  Time: 10 min  Activity: functional mobility/ ADLs  Functional Mobility  Functional - Mobility Device: Rolling Walker  Activity: To/from bathroom  ADL  Feeding: Independent  Grooming: Stand by assistance  UE Bathing: Stand by assistance  LE Bathing: Maximum assistance  UE Dressing: Stand by assistance  LE Dressing: Maximum assistance  Toileting: Stand by assistance  Additional Comments: Pt's LB ADLs limited by chest pain. Tone RUE  RUE Tone: Normotonic  Tone LUE  LUE Tone: Normotonic  Coordination  Movements Are Fluid And Coordinated: Yes     Bed mobility  Supine to Sit: Stand by assistance  Scooting: Stand by assistance  Comment: Pt left in recliner. Transfers  Sit to stand: Stand by assistance  Stand to sit: Stand by assistance  Vision - Basic Assessment  Visual History: Cataracts  Cognition  Overall Cognitive Status: Exceptions  Arousal/Alertness: Delayed responses to stimuli  Following Commands: Follows one step commands with repetition; Follows one step commands with increased time  Attention Span: Attends with cues to redirect  Memory: Appears intact  Safety Judgement: Decreased awareness of need for assistance  Problem Solving: Assistance required to generate solutions  Insights: Decreased awareness of deficits  Initiation: Requires cues for some  Sequencing: Requires cues for some  Perception  Overall Perceptual Status: WFL     Sensation  Overall Sensation Status: WFL        LUE AROM (degrees)  LUE AROM : WFL  Left Hand AROM (degrees)  Left Hand AROM: WFL  RUE AROM (degrees)  RUE AROM : WFL  Right Hand AROM (degrees)  Right Hand AROM: WFL  LUE Strength  Gross LUE Strength: WFL  RUE Strength  Gross RUE Strength: [facility] Plan   Plan  Times per week: 2-3  Times per day: Daily  Current Treatment Recommendations: Strengthening, ROM, Balance Training, Endurance Training, Self-Care / ADL, Patient/Caregiver Education & Training, Safety Education & Training, Home Management Training    G-Code     OutComes Score                                                  AM-PAC Score        AM-PAC Inpatient Daily Activity Raw Score: 17 (02/12/21 1149)  AM-PAC Inpatient ADL T-Scale Score : 37.26 (02/12/21 1149)  ADL Inpatient CMS 0-100% Score: 50.11 (02/12/21 1149)  ADL Inpatient CMS G-Code Modifier : CK (02/12/21 1149)    Goals  Short term goals  Time Frame for Short term goals: discharge  Short term goal 1: Pt will complete toliet transfer with MOd I  Short term goal 2: Pt will complete LBD with Mod I  Short term goal 3: Pt will complete UB bathing with Mod I  Short term goal 4: Pt will complete grooming with Mod I in stance  Long term goals  Time Frame for Long term goals : LTGs=STGs  Patient Goals   Patient goals : Go home       Therapy Time   Individual Concurrent Group Co-treatment   Time In 1046         Time Out 1124         Minutes 38              Timed Code Treatment Minutes:   23    Total Treatment Minutes:  [address], OT

## 2021-02-12 NOTE — PROGRESS NOTES
Treatment Diagnosis: decreased activity tolerance  Prognosis: Good  Decision Making: Low Complexity  Clinical Presentation: stable  PT Education: Goals;PT Role;Plan of Care;Transfer Training;Energy Conservation;General Safety;Orientation;Gait Training;Functional Mobility Training  Patient Education: d/c recommendations, use of RW at home for safe mobility--pt verbalizing understanding  Barriers to Learning: hearing  REQUIRES PT FOLLOW UP: Yes  Activity Tolerance  Activity Tolerance: Patient Tolerated treatment well  Activity Tolerance: SpO2 above 95% throughout session on 1L       Patient Diagnosis(es): The primary encounter diagnosis was Single subsegmental pulmonary embolism without acute cor pulmonale ([facility] A diagnosis of Shortness of breath was also pertinent to this visit. has a past medical history of Albinoidism ([facility] CAD (coronary artery disease), COPD (chronic obstructive pulmonary disease) ([facility] Depression, Diabetes mellitus ([facility] Diet-controlled diabetes mellitus ([facility] Hearing impaired, Hiatal hernia, Hyperlipidemia, Hypertension, Kidney stone, Malignant melanoma ([facility] Osteoarthritis, Otosclerosis, Pneumonia, and Stented coronary artery. has a past surgical history that includes Coronary angioplasty with stent; Skin cancer excision (<2000); knee surgery; Stapedes surgery ([id number]); skin biopsy; Colonoscopy; Endoscopy, colon, diagnostic; Coronary artery bypass graft (03/17/15); and leo and bso (cervix removed) (1987).     Restrictions  Restrictions/Precautions  Restrictions/Precautions: Fall Risk(high fall risk)  Position Activity Restriction Leisure & Hobbies: crafts  IADL Comments: meals delivered. 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 Objective  AROM RLE (degrees)  RLE AROM: WFL  AROM LLE (degrees)  LLE AROM : WFL  Strength RLE  Strength RLE: WFL  Comment: grossly 4-/5  Strength LLE  Strength LLE: WFL  Comment: grossly 4-/5  Tone RLE  RLE Tone: Normotonic  Tone LLE  LLE Tone: Normotonic  Motor Control  Gross Motor?: WFL  Sensation  Overall Sensation Status: [facility]  Bed mobility  Supine to Sit: Stand by assistance  Scooting: Stand by assistance  Transfers  Sit to Stand: Stand by assistance(x3 EOB, x1 recliner)  Stand to sit: Stand by assistance  Comment: Pt with safe hand placement when transferring with RW.   Ambulation  Ambulation?: Yes  Ambulation 1  Surface: level tile  Device: Rolling Walker  Other Apparatus: O2(1L)  Assistance: Stand by assistance  Quality of Gait: wide Gilma, decreased douglas  Distance: 5'+36'  Comments: Pt required slightly increased time to perform but no LOB and able to negotiate obstacles without assist.  Stairs/Curb  Stairs?: No     Balance  Posture: Good  Sitting - Static: Good;-  Sitting - Dynamic: Good;-(SBA seated at EOB during ADLs and gown change ~8-10 minutes total)  Standing - Static: Good;-(SBA standing with RW for UE support)  Standing - Dynamic: Good;-(SBA for transfers and ambulation with RW)        Plan   Plan  Times per week: 3-5x  Times per day: Daily  Current Treatment Recommendations: Strengthening, Balance Training, Functional Mobility Training, Transfer Training, Gait Training, Stair training, Endurance Training, Neuromuscular Re-education, Positioning, Modalities, Equipment Evaluation, Education, & procurement, Patient/Caregiver Education & Training, Safety Education & Training, Home Exercise Program  Safety Devices Type of devices:  All fall risk precautions in place, Left in chair, Call light within reach, Chair alarm in place, Gait belt, Patient at risk for falls, Nurse notified  Restraints  Initially in place: No    G-Code       OutComes Score       AM-PAC Score  AM-PAC Inpatient Mobility Raw Score : 19 (02/12/21 1140)  AM-PAC Inpatient T-Scale Score : 45.44 (02/12/21 1140)  Mobility Inpatient CMS 0-100% Score: 41.77 (02/12/21 1140)  Mobility Inpatient CMS G-Code Modifier : CK (02/12/21 1140)          Goals  Short term goals  Time Frame for Short term goals: upon d/c  Short term goal 1: Pt will perform bed mobility MOD I  Short term goal 2: Pt will perform transfers with RW MOD I  Short term goal 3: Pt will ambulate 48' with RW MOD I  Short term goal 4: Pt will negotiate 3 steps with HR and supervision  Patient Goals   Patient goals : pt did not state       Therapy Time   Individual Concurrent Group Co-treatment   Time In 1042         Time Out 1120         Minutes 38              Timed Code Treatment Minutes:   23    Total Treatment Minutes:  [address] [place], [place] [id number]

## 2021-02-13 LAB
BLOOD CULTURE, ROUTINE: NORMAL
CULTURE, BLOOD 2: NORMAL

## 2021-02-14 NOTE — DISCHARGE SUMMARY
Hospital Medicine Discharge Summary    Patient ID: Chad Erps      Patient's PCP: Anahy Soliz MD    Admit Date: 2/9/2021     Discharge Date: 2/12/2021      Admitting Physician: Phill Negro MD     Discharge Physician: Bernie Rizzo MD     Discharge Diagnoses: Active Hospital Problems    Diagnosis    Pulmonary embolism without acute cor pulmonale (HCC) [I26.99]       The patient was seen and examined on day of discharge and this discharge summary is in conjunction with any daily progress note from day of discharge. Hospital Course:     [de-identified] y.o. female who presented to ED with complaint of right flank pain radiating up to the right scapula and to the right breast.  She reports pain began acutely last night unprovoked.  She describes pain as squeezing.  She reports associated shortness of breath and cough.  She denies fever, dizziness, abdominal pain, nausea, vomiting, diarrhea, constipation, urinary symptoms.  She denies any personal history of blood clots. She does report a family history of blood clots stating her mom had a blood clot in her leg. She denies any recent surgery, limb injury or fracture, or prolonged stay in bed. Lower extremities negative for DVT. Patient seen by hematology, limited work-up sent, will need lifelong anticoagulation. Patient started on Eliquis on discharge. COVID-19 was negative. Physical Exam Performed:     /68   Pulse 61   Temp 98.5 °F (36.9 °C) (Oral)   Resp 18   Ht 5' (1.524 m)   Wt 225 lb 8.5 oz (102.3 kg)   SpO2 95%   BMI 44.05 kg/m²       General appearance:  No apparent distress, appears stated age and cooperative. HEENT:  Normal cephalic, atraumatic without obvious deformity. Pupils equal, round, and reactive to light. Extra ocular muscles intact. Conjunctivae/corneas clear. Neck: Supple, with full range of motion. No jugular venous distention. Trachea midline. Respiratory:  Normal respiratory effort. Clear to auscultation, bilaterally without Rales/Wheezes/Rhonchi. Cardiovascular:  Regular rate and rhythm with normal S1/S2 without murmurs, rubs or gallops. Abdomen: Soft, non-tender, non-distended with normal bowel sounds. Musculoskeletal:  No clubbing, cyanosis or edema bilaterally. Full range of motion without deformity. Skin: Skin color, texture, turgor normal.  No rashes or lesions. Neurologic:  Neurovascularly intact without any focal sensory/motor deficits. Cranial nerves: II-XII intact, grossly non-focal.  Psychiatric:  Alert and oriented, thought content appropriate, normal insight  Capillary Refill: Brisk,< 3 seconds   Peripheral Pulses: +2 palpable, equal bilaterally       Labs: For convenience and continuity at follow-up the following most recent labs are provided:      CBC:    Lab Results   Component Value Date    WBC 13.8 02/12/2021    HGB 10.4 02/12/2021    HCT 32.0 02/12/2021     02/12/2021       Renal:    Lab Results   Component Value Date     02/12/2021    K 3.7 02/12/2021    K 4.1 02/10/2021     02/12/2021    CO2 23 02/12/2021    BUN 14 02/12/2021    CREATININE 1.0 02/12/2021    CALCIUM 9.2 02/12/2021    PHOS 2.7 02/12/2021         Significant Diagnostic Studies    Radiology:   VL Extremity Venous Bilateral   Final Result      CT CHEST PULMONARY EMBOLISM W CONTRAST   Final Result   Single subsegmental acute pulmonary embolus along the pulmonary artery to the   right lower lobe extending distally. Mildly dilated and atherosclerotic thoracic aorta with no aneurysm or   dissection. Status post CABG surgery with no mediastinal mass or adenopathy. Mild bibasilar atelectasis or early infiltrates and associated tiny bibasilar   pleural effusions which is more prominent on the right. The findings were called to Dr. Fabiana Tolbert at the time of the interpretation at   8:15 p.m. CT ABDOMEN PELVIS W IV CONTRAST Additional Contrast? None   Final Result   No acute inflammatory abnormality in the abdomen or pelvis is identified. XR CHEST PORTABLE   Final Result   Postop changes along the sternum with stable cardiomegaly. Minimal bibasilar discoid atelectasis or scarring which is much less   prominent. Consults:     IP CONSULT TO HOSPITALIST  IP CONSULT TO ONCOLOGY  IP CONSULT TO HEM/ONC  IP CONSULT TO HOME CARE NEEDS    Disposition: Home with home care    Condition at Discharge: Stable    Discharge Instructions/Follow-up: PCP  Heme-onc    Code Status:  Prior     Activity: activity as tolerated    Diet: cardiac diet      Discharge Medications:     Discharge Medication List as of 2/12/2021  3:24 PM           Details   apixaban (ELIQUIS) 5 MG TABS tablet Take 2 tablets by mouth 2 times daily for 7 days, THEN 1 tablet 2 times daily. , Disp-60 tablet, R-0Normal              Details   omeprazole (PRILOSEC) 20 MG delayed release capsule Take one every morning before breakfast, Disp-90 capsule, R-1Normal      allopurinol (ZYLOPRIM) 300 MG tablet Take 1 tablet by mouth daily To protect kidneys and prevent joint pain from gout, Disp-90 tablet, R-3Normal      pitavastatin (LIVALO) 4 MG TABS tablet TAKE 1 TABLET NIGHTLY, Disp-90 tablet, R-3This is medically necessary for SECONDARY PREVENTION. She cannot tolerate any other statin --- truly allergic to others. Normal      meclizine (ANTIVERT) 12.5 MG tablet Take 1-2 tablets by mouth daily as needed for Dizziness, Disp-30 tablet, R-2Normal      valsartan (DIOVAN) 320 MG tablet Take 1 tablet by mouth daily, Disp-90 tablet, R-1This replaces Valsartan-HCTZ. Taking off the diurectic. Normal      metoprolol tartrate (LOPRESSOR) 50 MG tablet TAKE 1 TABLET TWICE A DAY, Disp-180 tablet, R-3Normal      DULoxetine (CYMBALTA) 60 MG extended release capsule TAKE 1 CAPSULE DAILY FOR ARTHRITIS PAIN AND MOOD, Disp-90 capsule, R-3Normal fluticasone (FLONASE) 50 MCG/ACT nasal spray USE 1 SPRAY NASALLY DAILY, Disp-48 g, R-2Normal      acetaminophen (APAP EXTRA STRENGTH) 500 MG tablet Take 2 tablets by mouth 3 times daily as needed for Pain, Disp-100 tablet, R-prnOTC      albuterol sulfate HFA (PROAIR HFA) 108 (90 Base) MCG/ACT inhaler Inhale 2 puffs into the lungs every 4 hours as needed for Wheezing or Shortness of Breath (cough, shortness breath or wheezing.), Disp-1 Inhaler, R-2Normal      promethazine (PHENERGAN) 25 MG tablet Take 0.5 tablets by mouth every 6 hours as needed for Nausea, Disp-60 tablet, R-1Normal      Multiple Vitamins-Minerals (THERAPEUTIC MULTIVITAMIN-MINERALS) tablet Take 1 tablet by mouth dailyHistorical Med      vitamin B-12 (CYANOCOBALAMIN) 1000 MCG tablet Take 2 tablets by mouth daily, Disp-30 tabletHistorical Med      aspirin 81 MG tablet Take 81 mg by mouth daily      nitroGLYCERIN (NITROSTAT) 0.4 MG SL tablet Place 1 tablet under the tongue every 5 minutes as needed for Chest pain, Disp-25 tablet, R-0      Omega-3 Krill Oil 300 MG CAPS Take 1 tablet by mouth daily              Time Spent on discharge is more than 30 minutes in the examination, evaluation, counseling and review of medications and discharge plan.       Signed:    Electronically signed by Franklin King MD on 2/14/2021 at 6:37 PM

## 2021-02-15 LAB
DRVVT CONFIRMATION TEST: ABNORMAL RATIO
DRVVT SCREEN: 31 SEC (ref 33–44)
DRVVT,DIL: ABNORMAL SEC (ref 33–44)
FACTOR V LEIDEN: NEGATIVE
HEXAGONAL PHOSPHOLIPID NEUTRALIZAT TEST: ABNORMAL
LUPUS ANTICOAG INTERP: ABNORMAL
PLT NEUTA: ABNORMAL
PT D: 14.1 SEC (ref 12–15.5)
PTT D: 76 SEC (ref 32–48)
PTT-D CORR REFLEX: 45 SEC (ref 32–48)
PTT-HEPARIN NEUTRALIZED: 52 SEC (ref 32–48)
REPTILASE TIME: 15.3 SEC
SPECIMEN: NORMAL
THROMBIN TIME: 32.7 SEC (ref 14.7–19.5)

## 2021-02-16 LAB
PROTHROMBIN G20210A MUTATION: NEGATIVE
PT PCR SPECIMEN: NORMAL

## 2021-02-18 ENCOUNTER — TELEPHONE (OUTPATIENT)
Dept: INTERNAL MEDICINE CLINIC | Age: 81
End: 2021-02-18

## 2021-02-25 ENCOUNTER — OFFICE VISIT (OUTPATIENT)
Dept: INTERNAL MEDICINE CLINIC | Age: 81
End: 2021-02-25
Payer: COMMERCIAL

## 2021-02-25 VITALS
WEIGHT: 220 LBS | SYSTOLIC BLOOD PRESSURE: 136 MMHG | TEMPERATURE: 95.1 F | BODY MASS INDEX: 43.19 KG/M2 | HEIGHT: 60 IN | HEART RATE: 80 BPM | DIASTOLIC BLOOD PRESSURE: 70 MMHG

## 2021-02-25 DIAGNOSIS — Z09 HOSPITAL DISCHARGE FOLLOW-UP: Primary | ICD-10-CM

## 2021-02-25 DIAGNOSIS — I65.23 ATHEROSCLEROSIS OF BOTH CAROTID ARTERIES: ICD-10-CM

## 2021-02-25 DIAGNOSIS — D64.9 ANEMIA, UNSPECIFIED TYPE: ICD-10-CM

## 2021-02-25 DIAGNOSIS — N18.32 STAGE 3B CHRONIC KIDNEY DISEASE (HCC): ICD-10-CM

## 2021-02-25 DIAGNOSIS — F17.200 TOBACCO DEPENDENCE: ICD-10-CM

## 2021-02-25 DIAGNOSIS — I34.0 MILD MITRAL REGURGITATION: ICD-10-CM

## 2021-02-25 DIAGNOSIS — I26.99 OTHER ACUTE PULMONARY EMBOLISM WITHOUT ACUTE COR PULMONALE (HCC): ICD-10-CM

## 2021-02-25 LAB
BASOPHILS ABSOLUTE: 0.1 K/UL (ref 0–0.2)
BASOPHILS RELATIVE PERCENT: 0.5 %
EOSINOPHILS ABSOLUTE: 0.2 K/UL (ref 0–0.6)
EOSINOPHILS RELATIVE PERCENT: 1.4 %
FOLATE: >20 NG/ML (ref 4.78–24.2)
HCT VFR BLD CALC: 38 % (ref 36–48)
HEMOGLOBIN: 12.4 G/DL (ref 12–16)
IRON SATURATION: 12 % (ref 15–50)
IRON: 37 UG/DL (ref 37–145)
LYMPHOCYTES ABSOLUTE: 3 K/UL (ref 1–5.1)
LYMPHOCYTES RELATIVE PERCENT: 18.4 %
MCH RBC QN AUTO: 28.5 PG (ref 26–34)
MCHC RBC AUTO-ENTMCNC: 32.6 G/DL (ref 31–36)
MCV RBC AUTO: 87.6 FL (ref 80–100)
MONOCYTES ABSOLUTE: 1.1 K/UL (ref 0–1.3)
MONOCYTES RELATIVE PERCENT: 6.4 %
NEUTROPHILS ABSOLUTE: 12.1 K/UL (ref 1.7–7.7)
NEUTROPHILS RELATIVE PERCENT: 73.3 %
PDW BLD-RTO: 15.6 % (ref 12.4–15.4)
PLATELET # BLD: 659 K/UL (ref 135–450)
PMV BLD AUTO: 7.7 FL (ref 5–10.5)
RBC # BLD: 4.34 M/UL (ref 4–5.2)
TOTAL IRON BINDING CAPACITY: 297 UG/DL (ref 260–445)
VITAMIN B-12: >2000 PG/ML (ref 211–911)
WBC # BLD: 16.5 K/UL (ref 4–11)

## 2021-02-25 PROCEDURE — 99214 OFFICE O/P EST MOD 30 MIN: CPT | Performed by: FAMILY MEDICINE

## 2021-02-25 PROCEDURE — 4004F PT TOBACCO SCREEN RCVD TLK: CPT | Performed by: FAMILY MEDICINE

## 2021-02-25 PROCEDURE — 4040F PNEUMOC VAC/ADMIN/RCVD: CPT | Performed by: FAMILY MEDICINE

## 2021-02-25 PROCEDURE — 1090F PRES/ABSN URINE INCON ASSESS: CPT | Performed by: FAMILY MEDICINE

## 2021-02-25 PROCEDURE — G8484 FLU IMMUNIZE NO ADMIN: HCPCS | Performed by: FAMILY MEDICINE

## 2021-02-25 PROCEDURE — 1111F DSCHRG MED/CURRENT MED MERGE: CPT | Performed by: FAMILY MEDICINE

## 2021-02-25 PROCEDURE — 1123F ACP DISCUSS/DSCN MKR DOCD: CPT | Performed by: FAMILY MEDICINE

## 2021-02-25 PROCEDURE — G8417 CALC BMI ABV UP PARAM F/U: HCPCS | Performed by: FAMILY MEDICINE

## 2021-02-25 PROCEDURE — G8427 DOCREV CUR MEDS BY ELIG CLIN: HCPCS | Performed by: FAMILY MEDICINE

## 2021-02-25 PROCEDURE — G8399 PT W/DXA RESULTS DOCUMENT: HCPCS | Performed by: FAMILY MEDICINE

## 2021-02-25 RX ORDER — NICOTINE 21 MG/24HR
1 PATCH, TRANSDERMAL 24 HOURS TRANSDERMAL DAILY
Qty: 30 PATCH | Refills: 2 | Status: SHIPPED | OUTPATIENT
Start: 2021-02-25 | End: 2021-07-28

## 2021-02-25 RX ORDER — NICOTINE POLACRILEX 2 MG
GUM BUCCAL
COMMUNITY

## 2021-02-25 RX ORDER — POLYETHYLENE GLYCOL 3350 17 G
4 POWDER IN PACKET (EA) ORAL PRN
Qty: 100 EACH | Refills: 3 | Status: SHIPPED | OUTPATIENT
Start: 2021-02-25 | End: 2021-06-07

## 2021-02-25 NOTE — PATIENT INSTRUCTIONS
We do not know where the blood clot originated. Maybe the veins in the pelvic area or the abdomen or even in the lung. Less likely the arms. No blood count found in the legs. The clotting tests that were able to be on on the blood thinner are all normal and did not show a genetic clotting disorder. The white count could be elevated from stress and/or smoking. Dr. Cristy Porter will do more testing and if still elevated will probably test for leukemia with a flow cytometry test.  If it is a chronic leukemia, it is very mild at this time. Odds are better that it is not leukemia    The blood thinner risk is bleeding:  If fall and hit your head a risk of bleeding in your brain. Intestinal or stomach can bleed more heavily if there is a disease that causes the bleeding.     xxxxxxxxxxxxxxxxxxxxxxxxxxxxxxxxxxxxx      If you have nightmares with the nicotine patch, take off the patch at bedtime and put on the first thing in the AM.

## 2021-02-25 NOTE — PROGRESS NOTES
Post-Discharge Transitional Care Management Services or Hospital Follow Up      Stephen Villegas   YOB: 1940    Date of Office Visit:  2/25/2021  Date of Hospital Admission: 2/9/21  Date of Hospital Discharge: 2/12/21  Readmission Risk Score(high >=14%. Medium >=10%):Readmission Risk Score: 11      Care management risk score Rising risk (score 2-5) and Complex Care (Scores >=6): 3     Non face to face  following discharge, date last encounter closed (first attempt may have been earlier): *No documented post hospital discharge outreach found in the last 14 days *No documented post hospital discharge outreach found in the last 14 days    Call initiated 2 business days of discharge: *No response recorded in the last 14 days     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[address]    Coronary artery disease involving native coronary artery of native heart without angina pectoris    Hearing impaired    Vertigo    GERD (gastroesophageal reflux disease)    Intertrigo    Abnormal EKG    Essential hypertension    History of melanoma    Iron deficiency anemia    Tobacco dependence    Sacroiliac pain    Chronic rhinitis    B12 deficiency    Blepharitis of lower eyelids of both eyes    COPD, mild ([address]    Morbid obesity ([address]    Recurrent major depression in remission ([address]    Ganglion cyst of volar aspect of left wrist    Chronic kidney disease, stage III (moderate)    Hyperuricemia    Diabetes mellitus type 2, diet-controlled ([address]    Tobacco abuse    Class 3 severe obesity due to excess calories with body mass index (BMI) of 40.0 to 44.9 in adult [facility])    Pulmonary embolism without acute cor pulmonale (HCC)       Allergies   Allergen Reactions    Pcn [Penicillins] Hives    Tetanus Toxoids Itching    Statins Rash     Rash with Lipitor, Crestor, Zocor;  Probably pravastatin.        Medications listed as ordered at the time of discharge from hospital Joyce Leavitt   Home Medication Instructions HUAN:    Printed on:02/25/21 9311   Medication Information                      acetaminophen (APAP EXTRA STRENGTH) 500 MG tablet  Take 2 tablets by mouth 3 times daily as needed for Pain             albuterol sulfate HFA (PROAIR HFA) 108 (90 Base) MCG/ACT inhaler  Inhale 2 puffs into the lungs every 4 hours as needed for Wheezing or Shortness of Breath (cough, shortness breath or wheezing.)             allopurinol (ZYLOPRIM) 300 MG tablet  Take 1 tablet by mouth daily To protect kidneys and prevent joint pain from gout             apixaban (ELIQUIS) 5 MG TABS tablet  Take 2 tablets by mouth 2 times daily for 7 days, THEN 1 tablet 2 times daily.              aspirin 81 MG tablet  Take 81 mg by mouth daily             Biotin 1 MG CAPS  Take by mouth             DULoxetine (CYMBALTA) 60 MG extended release capsule  TAKE 1 CAPSULE DAILY FOR ARTHRITIS PAIN AND MOOD             fluticasone (FLONASE) 50 MCG/ACT nasal spray  USE 1 SPRAY NASALLY DAILY             meclizine (ANTIVERT) 12.5 MG tablet  Take 1-2 tablets by mouth daily as needed for Dizziness             metoprolol tartrate (LOPRESSOR) 50 MG tablet  TAKE 1 TABLET TWICE A DAY             Multiple Vitamins-Minerals (THERAPEUTIC MULTIVITAMIN-MINERALS) tablet  Take 1 tablet by mouth daily             nitroGLYCERIN (NITROSTAT) 0.4 MG SL tablet  Place 1 tablet under the tongue every 5 minutes as needed for Chest pain             Omega-3 Krill Oil 300 MG CAPS  Take 1 tablet by mouth daily              omeprazole (PRILOSEC) 20 MG delayed release capsule  Take one every morning before breakfast             pitavastatin (LIVALO) 4 MG TABS tablet  TAKE 1 TABLET NIGHTLY             promethazine (PHENERGAN) 25 MG tablet  Take 0.5 tablets by mouth every 6 hours as needed for Nausea             valsartan (DIOVAN) 320 MG tablet  Take 1 tablet by mouth daily             vitamin B-12 (CYANOCOBALAMIN) 1000 MCG tablet Take 2 tablets by mouth daily                   Medications marked \"taking\" at this time  Outpatient Medications Marked as Taking for the 2/25/21 encounter (Office Visit) with Caryn Espinosa MD   Medication Sig Dispense Refill    Biotin 1 MG CAPS Take by mouth      apixaban (ELIQUIS) 5 MG TABS tablet Take 2 tablets by mouth 2 times daily for 7 days, THEN 1 tablet 2 times daily.  60 tablet 0    omeprazole (PRILOSEC) 20 MG delayed release capsule Take one every morning before breakfast 90 capsule 1    allopurinol (ZYLOPRIM) 300 MG tablet Take 1 tablet by mouth daily To protect kidneys and prevent joint pain from gout 90 tablet 3    pitavastatin (LIVALO) 4 MG TABS tablet TAKE 1 TABLET NIGHTLY 90 tablet 3    valsartan (DIOVAN) 320 MG tablet Take 1 tablet by mouth daily 90 tablet 1    metoprolol tartrate (LOPRESSOR) 50 MG tablet TAKE 1 TABLET TWICE A  tablet 3    DULoxetine (CYMBALTA) 60 MG extended release capsule TAKE 1 CAPSULE DAILY FOR ARTHRITIS PAIN AND MOOD 90 capsule 3    fluticasone (FLONASE) 50 MCG/ACT nasal spray USE 1 SPRAY NASALLY DAILY 48 g 2    acetaminophen (APAP EXTRA STRENGTH) 500 MG tablet Take 2 tablets by mouth 3 times daily as needed for Pain 100 tablet prn    albuterol sulfate HFA (PROAIR HFA) 108 (90 Base) MCG/ACT inhaler Inhale 2 puffs into the lungs every 4 hours as needed for Wheezing or Shortness of Breath (cough, shortness breath or wheezing.) 1 Inhaler 2    Multiple Vitamins-Minerals (THERAPEUTIC MULTIVITAMIN-MINERALS) tablet Take 1 tablet by mouth daily      vitamin B-12 (CYANOCOBALAMIN) 1000 MCG tablet Take 2 tablets by mouth daily 30 tablet     aspirin 81 MG tablet Take 81 mg by mouth daily      Omega-3 Krill Oil 300 MG CAPS Take 1 tablet by mouth daily           Medications patient taking as of now reconciled against medications ordered at time of hospital discharge: Yes    Chief Complaint   Patient presents with    Follow-Up from Hospital       HPI Admitted to [facility] for acute PE without cor pulmonale. Was having right flank pain that radiated to right scapula and right breast.  Some SOB and cough at that time. Treated with heparin and changed to Eliquis at discharge. Venous doppler of legs negative for clot. No source of clot found. She is mostly sedentary. COVID19 negative. Hematology consult. Recommended lifetime anticoagulation. Inpatient course: Discharge summary reviewed- see chart. Interval history/Current status:   Feeling really tired. Does get little twinges of chest pain. Otherwise she feels stable. Is ready to try to quit smoking. Did not smoke at all int  hospital but as soon as she got out, starting back at 1 ppd. Questions from daughter Mireya Brochure: abnormal CBC, clotting tests. We discussed her mildly elevated white counts and also her clotting tests done were negative for thrombophilia but cannot test all because of being on heparin at the time. Sees the hematologist on 3/8/21    Review of Systems   Constitutional: Positive for fatigue. HENT: Negative for nosebleeds. Respiratory: Positive for cough and shortness of breath. Cough and SOB are baseline and improved compared to admission. Cardiovascular: Negative for chest pain and leg swelling. Gastrointestinal: Negative for anal bleeding and blood in stool. Genitourinary: Negative for hematuria. Neurological: Negative for dizziness. Hematological:        Denies excessive bruising       Vitals:    02/25/21 1155   BP: 136/70   Pulse: 80   Temp: 95.1 °F (35.1 °C)   TempSrc: Temporal   Weight: 220 lb (99.8 kg)   Height: 5' (1.524 m)     Body mass index is 42.97 kg/m². Wt Readings from Last 3 Encounters:   02/25/21 220 lb (99.8 kg)   02/12/21 225 lb 8.5 oz (102.3 kg)   09/25/20 225 lb (102.1 kg)     BP Readings from Last 3 Encounters:   02/25/21 136/70   02/12/21 112/68   01/25/21 134/76       Physical Exam  Vitals signs reviewed. Constitutional:       General: She is not in acute distress. Appearance: Normal appearance. She is well-developed. She is obese. She is not diaphoretic. Eyes:      General: No scleral icterus. Neck:      Musculoskeletal: Neck supple. Thyroid: No thyroid mass or thyromegaly. Vascular: No carotid bruit. Cardiovascular:      Rate and Rhythm: Normal rate and regular rhythm. Pulses: Decreased pulses. Heart sounds: S1 normal and S2 normal. Heart sounds are distant. No murmur. No gallop. Comments: Trace ankle edema. Pulmonary:      Effort: Pulmonary effort is normal. No tachypnea or respiratory distress. Breath sounds: Decreased breath sounds present. No wheezing, rhonchi or rales. Abdominal:      General: Bowel sounds are normal. There is no abdominal bruit. Palpations: Abdomen is soft. There is no hepatomegaly or mass. Tenderness: There is no abdominal tenderness. Musculoskeletal:      Right lower leg: Edema present. Left lower leg: Edema present. Lymphadenopathy:      Cervical: No cervical adenopathy. Skin:     General: Skin is warm and dry. Coloration: Skin is not pale. Nails: There is no clubbing. Neurological:      Mental Status: She is alert and oriented to person, place, and time. Motor: No weakness, tremor, atrophy or abnormal muscle tone. Coordination: Coordination normal.      Gait: Gait abnormal (gait is mildly unsteady. needs minimal/SBA). Psychiatric:         Speech: Speech normal.         Behavior: Behavior normal.         Assessment/Plan:  1. Hospital discharge follow-up  - [place] DISCHARGE MEDS RECONCILED W/ CURRENT OUTPATIENT MED LIST    2. Other acute pulmonary embolism without acute cor pulmonale (HCC)  Now on Eliquis. Will need lifelong anticoagulation. Clot was unprovoked. 3. Anemia, unspecified type  Could be from CKD. R/o iron or vitamin deficiencies as cause.     - CBC Auto Differential  - Iron and TIBC - Vitamin B12  - Folate    4. Stage 3b chronic kidney disease  Est GFR 43-53     5. Tobacco dependence  Discussed need to stop smoking. She is at the stage of wanting to work on this again. She would like to try nicotine replacement. dwp ZERO cigarettes once on patch.    - nicotine (NICODERM CQ) 21 MG/24HR; Place 1 patch onto the skin daily  Dispense: 30 patch; Refill: 2  - nicotine polacrilex (NICORETTE) 4 MG lozenge; Take 1 lozenge by mouth as needed (craving for a cigarette)  Dispense: 100 each;  Refill: 3          Medical Decision Making: moderate complexity

## 2021-02-28 PROBLEM — I34.0 MILD MITRAL REGURGITATION: Status: ACTIVE | Noted: 2021-02-28

## 2021-02-28 PROBLEM — I65.23 ATHEROSCLEROSIS OF BOTH CAROTID ARTERIES: Status: ACTIVE | Noted: 2021-02-28

## 2021-02-28 PROBLEM — E66.01 MORBID OBESITY (HCC): Status: RESOLVED | Noted: 2017-11-27 | Resolved: 2021-02-28

## 2021-02-28 PROBLEM — Z72.0 TOBACCO ABUSE: Status: RESOLVED | Noted: 2020-01-10 | Resolved: 2021-02-28

## 2021-02-28 ASSESSMENT — ENCOUNTER SYMPTOMS
BLOOD IN STOOL: 0
ANAL BLEEDING: 0
SHORTNESS OF BREATH: 1
COUGH: 1

## 2021-03-02 ENCOUNTER — TELEPHONE (OUTPATIENT)
Dept: INTERNAL MEDICINE CLINIC | Age: 81
End: 2021-03-02

## 2021-04-02 ENCOUNTER — TELEPHONE (OUTPATIENT)
Dept: INTERNAL MEDICINE CLINIC | Age: 81
End: 2021-04-02

## 2021-04-02 NOTE — TELEPHONE ENCOUNTER
Sophie with [facility] states patient was recently in hospital with blood clot. Patient reports pain right chest are where breast is and states has had pain since returning home from hospital.  Pain is when patient lies down. Sophie states patient vitals are good and she has no shortness of breath. Patient also reported she had a muscle tear same area years ago.

## 2021-04-26 ENCOUNTER — OFFICE VISIT (OUTPATIENT)
Dept: INTERNAL MEDICINE CLINIC | Age: 81
End: 2021-04-26
Payer: MEDICARE

## 2021-04-26 ENCOUNTER — TELEPHONE (OUTPATIENT)
Dept: INTERNAL MEDICINE CLINIC | Age: 81
End: 2021-04-26

## 2021-04-26 VITALS
HEART RATE: 66 BPM | SYSTOLIC BLOOD PRESSURE: 142 MMHG | DIASTOLIC BLOOD PRESSURE: 62 MMHG | BODY MASS INDEX: 43.51 KG/M2 | WEIGHT: 221.6 LBS | HEIGHT: 60 IN

## 2021-04-26 DIAGNOSIS — N18.32 STAGE 3B CHRONIC KIDNEY DISEASE (HCC): ICD-10-CM

## 2021-04-26 DIAGNOSIS — I26.99 OTHER ACUTE PULMONARY EMBOLISM WITHOUT ACUTE COR PULMONALE (HCC): ICD-10-CM

## 2021-04-26 DIAGNOSIS — I25.10 CORONARY ARTERY DISEASE INVOLVING NATIVE CORONARY ARTERY OF NATIVE HEART WITHOUT ANGINA PECTORIS: ICD-10-CM

## 2021-04-26 DIAGNOSIS — Z78.0 POSTMENOPAUSAL: ICD-10-CM

## 2021-04-26 DIAGNOSIS — E78.00 HYPERCHOLESTEREMIA: ICD-10-CM

## 2021-04-26 DIAGNOSIS — K21.00 GASTROESOPHAGEAL REFLUX DISEASE WITH ESOPHAGITIS WITHOUT HEMORRHAGE: Primary | ICD-10-CM

## 2021-04-26 DIAGNOSIS — D50.9 IRON DEFICIENCY ANEMIA, UNSPECIFIED IRON DEFICIENCY ANEMIA TYPE: ICD-10-CM

## 2021-04-26 DIAGNOSIS — E11.9 DIABETES MELLITUS TYPE 2, DIET-CONTROLLED (HCC): ICD-10-CM

## 2021-04-26 DIAGNOSIS — F17.200 TOBACCO DEPENDENCE: ICD-10-CM

## 2021-04-26 DIAGNOSIS — E66.01 CLASS 3 SEVERE OBESITY DUE TO EXCESS CALORIES WITH SERIOUS COMORBIDITY AND BODY MASS INDEX (BMI) OF 40.0 TO 44.9 IN ADULT (HCC): ICD-10-CM

## 2021-04-26 DIAGNOSIS — J44.9 COPD, MILD (HCC): ICD-10-CM

## 2021-04-26 DIAGNOSIS — F33.40 RECURRENT MAJOR DEPRESSION IN REMISSION (HCC): ICD-10-CM

## 2021-04-26 PROCEDURE — G8399 PT W/DXA RESULTS DOCUMENT: HCPCS | Performed by: FAMILY MEDICINE

## 2021-04-26 PROCEDURE — 3023F SPIROM DOC REV: CPT | Performed by: FAMILY MEDICINE

## 2021-04-26 PROCEDURE — G8926 SPIRO NO PERF OR DOC: HCPCS | Performed by: FAMILY MEDICINE

## 2021-04-26 PROCEDURE — 99214 OFFICE O/P EST MOD 30 MIN: CPT | Performed by: FAMILY MEDICINE

## 2021-04-26 PROCEDURE — 4004F PT TOBACCO SCREEN RCVD TLK: CPT | Performed by: FAMILY MEDICINE

## 2021-04-26 PROCEDURE — 1123F ACP DISCUSS/DSCN MKR DOCD: CPT | Performed by: FAMILY MEDICINE

## 2021-04-26 PROCEDURE — G8427 DOCREV CUR MEDS BY ELIG CLIN: HCPCS | Performed by: FAMILY MEDICINE

## 2021-04-26 PROCEDURE — G8417 CALC BMI ABV UP PARAM F/U: HCPCS | Performed by: FAMILY MEDICINE

## 2021-04-26 PROCEDURE — 1090F PRES/ABSN URINE INCON ASSESS: CPT | Performed by: FAMILY MEDICINE

## 2021-04-26 PROCEDURE — 4040F PNEUMOC VAC/ADMIN/RCVD: CPT | Performed by: FAMILY MEDICINE

## 2021-04-26 RX ORDER — ESOMEPRAZOLE MAGNESIUM 40 MG/1
40 CAPSULE, DELAYED RELEASE ORAL
Qty: 30 CAPSULE | Refills: 2 | Status: SHIPPED | OUTPATIENT
Start: 2021-04-26 | End: 2021-04-26 | Stop reason: CLARIF

## 2021-04-26 RX ORDER — PITAVASTATIN CALCIUM 4.18 MG/1
TABLET, FILM COATED ORAL
Qty: 90 TABLET | Refills: 3 | Status: SHIPPED | OUTPATIENT
Start: 2021-04-26 | End: 2021-06-07 | Stop reason: SDUPTHER

## 2021-04-26 RX ORDER — APIXABAN 5 MG/1
2.5 TABLET, FILM COATED ORAL 2 TIMES DAILY
COMMUNITY
Start: 2021-04-13 | End: 2022-03-04

## 2021-04-26 RX ORDER — PANTOPRAZOLE SODIUM 40 MG/1
40 TABLET, DELAYED RELEASE ORAL
Qty: 30 TABLET | Refills: 1 | Status: SHIPPED | OUTPATIENT
Start: 2021-04-26 | End: 2021-04-27

## 2021-04-26 ASSESSMENT — ENCOUNTER SYMPTOMS
BLOOD IN STOOL: 0
DIARRHEA: 0

## 2021-04-26 NOTE — PATIENT INSTRUCTIONS
$80 for 10 packs x 3 = 30 days - $240 per month. Nicotine wakes up your brain so avoid smoking in the middle of the night or before bedtime. Relaxation training   (you can consider finding an akiko for your phone or tablet to help you with this, but this is not just deep breathing to soothing music or nature sounds. It must also tell you when to tighten and relax specific muscle groups). Do relaxation exercises 10-20 minutes a day for at least 8 weeks, then at least 5 minutes daily after this. You can play soothing, relaxing music while you do them, if you wish. · Tell others in your house that you are going to do your relaxation exercises. Ask them not to disturb you. · Find a comfortable place, away from all distractions and noise. · Lie down on your back, or sit with your back straight. · Focus on your breathing. Make it slow and steady. · Breathe in through your nose. Breathe out through either your nose or mouth. · Breathe deeply, filling up the area between your navel and your rib cage. Breathe so that your belly goes up and down. · Do not hold your breath. · Breathe like this for 5 to 10 minutes. Notice the feeling of calmness throughout your whole body. As you continue to breathe slowly and deeply, relax by doing the following for another 5 to 10 minutes:  · Tighten and relax each muscle group in your body. You can begin at your toes and work your way up to your head. · Imagine your muscle groups relaxing and becoming heavy. · Empty your mind of all thoughts. · Let yourself relax more and more deeply. · Become aware of the state of calmness that surrounds you. · When your relaxation time is over, you can bring yourself back to alertness by moving your fingers and toes and then your hands and feet and then stretching and moving your entire body. Sometimes people fall asleep during relaxation, but they usually wake up shortly afterward. xxxxxxxxxxxxxxxxxxxxxxxxxxxxxxxxx[id number]  Patient Education        Esophagitis: Care Instructions  Your Care Instructions     Esophagitis (say \"ih-sof-uh-JY-tus\") is irritation of the esophagus, the tube that carries food from your throat to your stomach. Acid reflux is the most common cause of this condition. When you have reflux, stomach acid and juices flow upward. This can cause pain or a burning feeling in your chest. You may have a sore throat. It may be hard to swallow. Other causes of this condition include some medicines and supplements. Allergies or an infection can also cause it. Your doctor will ask about your symptoms and past health. He or she might do tests to find the cause of your symptoms. Treatment depends on what is causing the problem. Treatment might include changing your diet or taking medicine to relieve your symptoms. It might also include changing a medicine that is causing your symptoms. If you have reflux, medicine that reduces the stomach acid helps your body heal. It might take 1 to 3 weeks to heal.  Follow-up care is a key part of your treatment and safety. Be sure to make and go to all appointments, and call your doctor if you are having problems. It's also a good idea to know your test results and keep a list of the medicines you take. How can you care for yourself at home? · If you have acid reflux, your doctor may recommend that you:  ? Eat several small meals instead of two or three large meals. After you eat, wait 2 to 3 hours before you lie down. ? Avoid chocolate, mint, alcohol, and spicy foods. ? Don't smoke or use smokeless tobacco. Smoking can make this condition worse. If you need help quitting, talk to your doctor about stop-smoking programs and medicines. These can increase your chances of quitting for good. ? Raise the head of your bed 6 to 8 inches if you have symptoms at night. ? Lose weight if you are overweight.   ? Take an over-the-counter antacid, such as Maalox, Mylanta, or Tums. Be careful when you take over-the-counter antacid medicines. Many of these medicines have aspirin in them. Read the label to make sure that you are not taking more than the recommended dose. Too much aspirin can be harmful. ? Take stronger acid reducers. Examples are famotidine (such as Pepcid)  --- you can take this as needed for break through symptoms. It is over the counter. ? Take the esomeprazole (Nexium) right before your first bite of morning food. · If your condition is caused by infection, allergy, or other problems, use the medicine or treatments that your doctor recommends. · Be safe with medicines. Take your medicines exactly as prescribed. Call your doctor if you think you are having a problem with your medicine. When should you call for help? Call your doctor now or seek immediate medical care if:    · You have new or worse belly pain.     · You are vomiting. Watch closely for changes in your health, and be sure to contact your doctor if:    · You have new or worse symptoms of reflux.     · You have trouble or pain swallowing.     · You are losing weight.     · You do not get better as expected. Where can you learn more? Go to [email or web address]://[email or web address]. org and sign in to your "[employer] account. Enter Y477 in the [facility] box to learn more about \"Esophagitis: Care Instructions. \"     If you do not have an account, please click on the \"Sign Up Now\" link. Current as of: April 15, 2020               Content Version: 12.8  © 9161-0068 [employer]. Care instructions adapted under license by [facility] ([facility]). If you have questions about a medical condition or this instruction, always ask your healthcare professional. [place] [address] any warranty or liability for your use of this information.

## 2021-04-26 NOTE — PROGRESS NOTES
Subjective:      Patient ID: Riya Tolbert is a [de-identified] y.o. female. HPI  Chief Complaint   Patient presents with    3 Month Follow-Up     Pt c/o epigastric pain- medication for GERD does not help all the time. Patient complains of epigastric pain and right-sided chest pain. She thinks it is her esophageal problems acting up. She states she was told that her esophageal sphincter did not work at all. In the past an EGD showed a lot of inflammation. She is taking omeprazole 20 mg daily. It gives her a little bit of help but not complete help. The Zofran helps her nausea but she is tired of having symptoms. She is not sure if the right-sided chest pain could also be related to an injury from a fall where she hurt her right shoulder. It periodically aggravates her. She complied with my recommendation of 2 Tylenol every 6 hours. It did not help any of her symptoms. Still smoking a pack per day. Not on nicotine patch because could not afford it but plans on getting it filled since she just got her stimulus check. Even though she says the patches are too expensive, she is buying a pack of cigarettes a day which is costing her about $240 a month. When I wrote this down for her she started to see that it would be a cost savings to quit smoking. See Assessment for more detail on conditions addressed today.     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[address]    Coronary artery disease involving native coronary artery of native heart without angina pectoris    Hearing impaired    Vertigo    GERD (gastroesophageal reflux disease)    Intertrigo    Abnormal EKG    Essential hypertension    History of melanoma    Iron deficiency anemia    Tobacco dependence    Sacroiliac pain    Chronic rhinitis    B12 deficiency    Blepharitis of lower eyelids of both eyes    COPD, mild ([address]    Recurrent major depression in remission ([address]    Ganglion cyst of volar aspect of left wrist    Chronic kidney disease, stage III (moderate)    Hyperuricemia    Diabetes mellitus type 2, diet-controlled (HCC)    Class 3 severe obesity due to excess calories with body mass index (BMI) of 40.0 to 44.9 in adult [facility])    Pulmonary embolism without acute cor pulmonale (HCC)    Atherosclerosis of both carotid arteries    Mild mitral regurgitation  2014        Outpatient Medications Marked as Taking for the 4/26/21 encounter (Office Visit) with Bipin Goncalves MD   Medication Sig Dispense Refill    ELIQUIS 5 MG TABS tablet TAKE 1 TABLET BY MOUTH TWICE DAILY      Biotin 1 MG CAPS Take by mouth      omeprazole (PRILOSEC) 20 MG delayed release capsule Take one every morning before breakfast 90 capsule 1    allopurinol (ZYLOPRIM) 300 MG tablet Take 1 tablet by mouth daily To protect kidneys and prevent joint pain from gout 90 tablet 3    pitavastatin (LIVALO) 4 MG TABS tablet TAKE 1 TABLET NIGHTLY 90 tablet 3    meclizine (ANTIVERT) 12.5 MG tablet Take 1-2 tablets by mouth daily as needed for Dizziness 30 tablet 2    valsartan (DIOVAN) 320 MG tablet Take 1 tablet by mouth daily 90 tablet 1    metoprolol tartrate (LOPRESSOR) 50 MG tablet TAKE 1 TABLET TWICE A  tablet 3    DULoxetine (CYMBALTA) 60 MG extended release capsule TAKE 1 CAPSULE DAILY FOR ARTHRITIS PAIN AND MOOD 90 capsule 3    fluticasone (FLONASE) 50 MCG/ACT nasal spray USE 1 SPRAY NASALLY DAILY 48 g 2    acetaminophen (APAP EXTRA STRENGTH) 500 MG tablet Take 2 tablets by mouth 3 times daily as needed for Pain 100 tablet prn    albuterol sulfate HFA (PROAIR HFA) 108 (90 Base) MCG/ACT inhaler Inhale 2 puffs into the lungs every 4 hours as needed for Wheezing or Shortness of Breath (cough, shortness breath or wheezing.) 1 Inhaler 2    promethazine (PHENERGAN) 25 MG tablet Take 0.5 tablets by mouth every 6 hours as needed for Nausea 60 tablet 1    Multiple Vitamins-Minerals (THERAPEUTIC MULTIVITAMIN-MINERALS) tablet type 2, diet-controlled ([facility]  Lab Results   Component Value Date    LABA1C 6.2 01/25/2021     Last labs in January suggest impaired glucose tolerance. Not on any active diabetic medication. 6. Stage 3b chronic kidney disease  Labs suggest she may have improved to a stage IIIa. Continue to avoid nephrotoxic medication. Continue to avoid emelia and drink water. 7. COPD, mild ([address]  She denies symptoms. Encouraged to quit smoking. 8. Tobacco dependence  Tobacco cessation counseling discussed. Encouraged her to use her nicotine patches and as needed lozenges. Showed her the cost savings even if she had to pay out-of-pocket for these products. 9. Hypercholesteremia  Lab Results   Component Value Date    LDLCALC 100 (H) 09/25/2020    LDLDIRECT 97 03/13/2020     LDL is still not quite to goal despite using Livalo. I am not sure compliance is good. She again tells me she is having trouble getting it from [address] I sent it to her local [facility]. 10. Class 3 severe obesity due to excess calories with serious comorbidity and body mass index (BMI) of 40.0 to 44.9 in [facility])  Encouraged healthy diet. At the age of [de-identified] I am not optimistic she is going to make many lifestyle changes. 11. Iron deficiency anemia, unspecified iron deficiency anemia type  She is on iron supplement. She is seeing hematologist Dr. David Morales.  I reviewed his notes. 12. Other acute pulmonary embolism without acute cor pulmonale (HCC)  Patient is on Eliquis 5 mg twice daily for 6 months, and then will be cut back to 2.5 mg twice daily indefinitely. She is consulting with hematology. Plan:      See orders. See after visit summary, patient instructions, and reference hand-outs. A PRINTED SUMMARY = AVS GIVEN TO THE PATIENT, (or if Virtual Visit, patient told it is available in My Chart or will be mailed if not active on My Chart.)    Discussed use, benefit, and side effects of prescribed medications. Barriers to medication compliance addressed. All patient questions answered. Follow up:  Return in about 6 weeks (around 6/7/2021) for GI issues and how doing with smoking cessation.               Federico Nash MD

## 2021-04-26 NOTE — TELEPHONE ENCOUNTER
Also, esomeprazole magnesium 40 mg DR caps are not covered by the plan either. The preferred alternative is lansoprazole, omeprazole, and pantoprazole sodium.

## 2021-04-28 ENCOUNTER — TELEPHONE (OUTPATIENT)
Dept: ADMINISTRATIVE | Age: 81
End: 2021-04-28

## 2021-04-28 NOTE — TELEPHONE ENCOUNTER
Submitted PA for Livalo 4MG tablets. Janet MARR Case ID: [id number]. Via [employer] STATUS: APPROVED 03/29/2021; Coverage End Date:04/28/2022. Will scan letter when received. Please notify patient, thank you.

## 2021-05-24 DIAGNOSIS — K21.00 GASTROESOPHAGEAL REFLUX DISEASE WITH ESOPHAGITIS WITHOUT HEMORRHAGE: ICD-10-CM

## 2021-05-25 RX ORDER — PANTOPRAZOLE SODIUM 40 MG/1
TABLET, DELAYED RELEASE ORAL
Qty: 90 TABLET | Refills: 0 | Status: SHIPPED | OUTPATIENT
Start: 2021-05-25 | End: 2021-06-07 | Stop reason: SDUPTHER

## 2021-06-04 ENCOUNTER — HOSPITAL ENCOUNTER (OUTPATIENT)
Dept: GENERAL RADIOLOGY | Age: 81
Discharge: HOME | End: 2021-06-04
Payer: MEDICARE

## 2021-06-04 DIAGNOSIS — K21.00 GASTROESOPHAGEAL REFLUX DISEASE WITH ESOPHAGITIS WITHOUT HEMORRHAGE: ICD-10-CM

## 2021-06-04 DIAGNOSIS — Z78.0 POSTMENOPAUSAL: ICD-10-CM

## 2021-06-04 PROCEDURE — 77080 DXA BONE DENSITY AXIAL: CPT

## 2021-06-04 PROCEDURE — 74220 X-RAY XM ESOPHAGUS 1CNTRST: CPT

## 2021-06-07 ENCOUNTER — OFFICE VISIT (OUTPATIENT)
Dept: INTERNAL MEDICINE CLINIC | Age: 81
End: 2021-06-07
Payer: MEDICARE

## 2021-06-07 VITALS
WEIGHT: 219 LBS | HEART RATE: 84 BPM | BODY MASS INDEX: 43 KG/M2 | HEIGHT: 60 IN | SYSTOLIC BLOOD PRESSURE: 134 MMHG | DIASTOLIC BLOOD PRESSURE: 80 MMHG

## 2021-06-07 DIAGNOSIS — I10 ESSENTIAL HYPERTENSION: ICD-10-CM

## 2021-06-07 DIAGNOSIS — R13.19 ESOPHAGEAL DYSPHAGIA: Primary | ICD-10-CM

## 2021-06-07 DIAGNOSIS — K21.00 GASTROESOPHAGEAL REFLUX DISEASE WITH ESOPHAGITIS WITHOUT HEMORRHAGE: ICD-10-CM

## 2021-06-07 DIAGNOSIS — I25.10 CORONARY ARTERY DISEASE INVOLVING NATIVE CORONARY ARTERY OF NATIVE HEART WITHOUT ANGINA PECTORIS: ICD-10-CM

## 2021-06-07 DIAGNOSIS — T75.3XXD MOTION SICKNESS, SUBSEQUENT ENCOUNTER: ICD-10-CM

## 2021-06-07 DIAGNOSIS — F33.0 MILD EPISODE OF RECURRENT MAJOR DEPRESSIVE DISORDER (HCC): ICD-10-CM

## 2021-06-07 DIAGNOSIS — F17.200 TOBACCO DEPENDENCE: ICD-10-CM

## 2021-06-07 DIAGNOSIS — E79.0 HYPERURICEMIA: ICD-10-CM

## 2021-06-07 PROCEDURE — 1123F ACP DISCUSS/DSCN MKR DOCD: CPT | Performed by: FAMILY MEDICINE

## 2021-06-07 PROCEDURE — 99213 OFFICE O/P EST LOW 20 MIN: CPT | Performed by: FAMILY MEDICINE

## 2021-06-07 PROCEDURE — 1090F PRES/ABSN URINE INCON ASSESS: CPT | Performed by: FAMILY MEDICINE

## 2021-06-07 PROCEDURE — 4040F PNEUMOC VAC/ADMIN/RCVD: CPT | Performed by: FAMILY MEDICINE

## 2021-06-07 PROCEDURE — 4004F PT TOBACCO SCREEN RCVD TLK: CPT | Performed by: FAMILY MEDICINE

## 2021-06-07 PROCEDURE — G8427 DOCREV CUR MEDS BY ELIG CLIN: HCPCS | Performed by: FAMILY MEDICINE

## 2021-06-07 PROCEDURE — G8417 CALC BMI ABV UP PARAM F/U: HCPCS | Performed by: FAMILY MEDICINE

## 2021-06-07 PROCEDURE — G8399 PT W/DXA RESULTS DOCUMENT: HCPCS | Performed by: FAMILY MEDICINE

## 2021-06-07 RX ORDER — METOPROLOL TARTRATE 50 MG/1
TABLET, FILM COATED ORAL
Qty: 180 TABLET | Refills: 3 | Status: SHIPPED | OUTPATIENT
Start: 2021-06-07 | End: 2022-02-10

## 2021-06-07 RX ORDER — ALLOPURINOL 300 MG/1
300 TABLET ORAL DAILY
Qty: 90 TABLET | Refills: 3 | Status: SHIPPED | OUTPATIENT
Start: 2021-06-07 | End: 2022-06-10 | Stop reason: SDUPTHER

## 2021-06-07 RX ORDER — PANTOPRAZOLE SODIUM 40 MG/1
TABLET, DELAYED RELEASE ORAL
Qty: 90 TABLET | Refills: 3 | Status: SHIPPED | OUTPATIENT
Start: 2021-06-07 | End: 2021-07-28

## 2021-06-07 RX ORDER — PITAVASTATIN CALCIUM 4.18 MG/1
TABLET, FILM COATED ORAL
Qty: 90 TABLET | Refills: 3 | Status: SHIPPED | OUTPATIENT
Start: 2021-06-07 | End: 2022-03-28

## 2021-06-07 RX ORDER — MECLIZINE HCL 12.5 MG/1
12.5-25 TABLET ORAL DAILY PRN
Qty: 30 TABLET | Refills: 2 | Status: SHIPPED | OUTPATIENT
Start: 2021-06-07

## 2021-06-07 RX ORDER — DULOXETIN HYDROCHLORIDE 60 MG/1
CAPSULE, DELAYED RELEASE ORAL
Qty: 90 CAPSULE | Refills: 3 | Status: SHIPPED | OUTPATIENT
Start: 2021-06-07 | End: 2022-02-10

## 2021-06-07 RX ORDER — VALSARTAN 320 MG/1
320 TABLET ORAL DAILY
Qty: 90 TABLET | Refills: 3 | Status: SHIPPED | OUTPATIENT
Start: 2021-06-07 | End: 2022-07-05

## 2021-06-07 NOTE — PROGRESS NOTES
Subjective:      Patient ID: Deo Kendrick is a [de-identified] y.o. female. Here with her niece Sherry Raya    HPI   Chief Complaint   Patient presents with    Other     6 week follow up on GI issues, smoking       FU of GI issues and how she is doing with her smoking cessation efforts. Her chest pain resolved with getting on the pantoprazole. She is very pleased. She does have some swallowing things, lei meat. She is having trouble swallowing aspirin. Swallowing issues have been going on for a very long time. Smoking cessation. She is using nicotine patches and smoking but removes the patch when she smokes. She did go down to 5 cigs in AM :  Puts on the patch for the day and takes it off at night and 5 cigs in PM.  She was on 6 and 6 and is gradually cutting back.   She agrees to go to 4 and 4 this Friday   3 and 3 the week after, 2 and 2, etc.       Outpatient Medications Marked as Taking for the 6/7/21 encounter (Office Visit) with Emily Lester MD   Medication Sig Dispense Refill    pantoprazole (PROTONIX) 40 MG tablet TAKE 1 TABLET BY MOUTH EVERY MORNING BEFORE BREAKFAST 90 tablet 0    ELIQUIS 5 MG TABS tablet TAKE 1 TABLET BY MOUTH TWICE DAILY      pitavastatin (LIVALO) 4 MG TABS tablet TAKE 1 TABLET NIGHTLY 90 tablet 3    Biotin 1 MG CAPS Take by mouth      nicotine (NICODERM CQ) 21 MG/24HR Place 1 patch onto the skin daily 30 patch 2    allopurinol (ZYLOPRIM) 300 MG tablet Take 1 tablet by mouth daily To protect kidneys and prevent joint pain from gout 90 tablet 3    meclizine (ANTIVERT) 12.5 MG tablet Take 1-2 tablets by mouth daily as needed for Dizziness 30 tablet 2    valsartan (DIOVAN) 320 MG tablet Take 1 tablet by mouth daily 90 tablet 1    metoprolol tartrate (LOPRESSOR) 50 MG tablet TAKE 1 TABLET TWICE A  tablet 3    DULoxetine (CYMBALTA) 60 MG extended release capsule TAKE 1 CAPSULE DAILY FOR ARTHRITIS PAIN AND MOOD 90 capsule 3    fluticasone (FLONASE) 50 MCG/ACT nasal spray USE 1 SPRAY NASALLY DAILY 48 g 2    acetaminophen (APAP EXTRA STRENGTH) 500 MG tablet Take 2 tablets by mouth 3 times daily as needed for Pain 100 tablet prn    albuterol sulfate HFA (PROAIR HFA) 108 (90 Base) MCG/ACT inhaler Inhale 2 puffs into the lungs every 4 hours as needed for Wheezing or Shortness of Breath (cough, shortness breath or wheezing.) 1 Inhaler 2    Multiple Vitamins-Minerals (THERAPEUTIC MULTIVITAMIN-MINERALS) tablet Take 1 tablet by mouth daily      vitamin B-12 (CYANOCOBALAMIN) 1000 MCG tablet Take 2 tablets by mouth daily 30 tablet     aspirin 81 MG tablet Take 81 mg by mouth daily      Omega-3 Krill Oil 300 MG CAPS Take 1 tablet by mouth daily            Review of Systems    Objective:   Physical Exam  Vitals reviewed. Constitutional:       Appearance: She is well-developed. Cardiovascular:      Rate and Rhythm: Normal rate and regular rhythm. Heart sounds: Normal heart sounds. Pulmonary:      Effort: Pulmonary effort is normal.      Breath sounds: Normal breath sounds. Skin:     General: Skin is warm and dry. Coloration: Skin is not pale. Findings: No erythema. Psychiatric:         Behavior: Behavior normal.         Assessment:      1. Esophageal dysphagia  Barium tablet never entered stomach. dwp need for EGD. She is agreeable. - Pio Magallon MD, Gastroenterology, [facility]    2. Gastroesophageal reflux disease with esophagitis without hemorrhage  Continue PPI. EGD   - pantoprazole (PROTONIX) 40 MG tablet; TAKE 1 TABLET BY MOUTH EVERY MORNING BEFORE BREAKFAST  Dispense: 90 tablet; Refill: 3  - GIDEON - Samra Bell MD, Gastroenterology, [facility]    3. Tobacco dependence  Weaning and cessation discussed. Started out at 16 cigs per day and now down to 10 cigs per day    All the rest of these are routine med refills addressed issues at other visits.      4. Coronary artery disease involving native coronary artery of native heart without angina pectoris  - pitavastatin (LIVALO) 4 MG TABS tablet; TAKE 1 TABLET NIGHTLY  Dispense: 90 tablet; Refill: 3.    5. Hyperuricemia  - allopurinol (ZYLOPRIM) 300 MG tablet; Take 1 tablet by mouth daily To protect kidneys and prevent joint pain from gout  Dispense: 90 tablet; Refill: 3    6. Motion sickness, subsequent encounter  - meclizine (ANTIVERT) 12.5 MG tablet; Take 1-2 tablets by mouth daily as needed for Dizziness  Dispense: 30 tablet; Refill: 2    7. Essential hypertension  - valsartan (DIOVAN) 320 MG tablet; Take 1 tablet by mouth daily  Dispense: 90 tablet; Refill: 3    8. Mild episode of recurrent major depressive disorder (HCC)  - DULoxetine (CYMBALTA) 60 MG extended release capsule; TAKE 1 CAPSULE DAILY FOR ARTHRITIS PAIN AND MOOD  Dispense: 90 capsule; Refill: 3          Plan:      See orders. See after visit summary, patient instructions, and reference hand-outs. A PRINTED SUMMARY = AVS GIVEN TO THE PATIENT, (or if Virtual Visit, patient told it is available in My Chart or will be mailed if not active on My Chart.)    Discussed use, benefit, and side effects of prescribed medications. Barriers to medication compliance addressed. All patient questions answered. Follow up:  Return in about 4 months (around 10/7/2021).               Ac Hernandes MD

## 2021-06-07 NOTE — PATIENT INSTRUCTIONS
This Friday, go to 4 in AM and 4 in PM and then next Friday 3 in AM and 3 in PM,  The following week 2 in AM,  2 in PM.  Keep working down. I would prefer you use a Nicorette lozenge when you crave more than smoking but if you can at least get off them by weaning, this is OK. You do not have osteoporosis. You do have low bone density that is less severe than osteoporosis. It is called osteopenia. We will usually want a follow up bone density scan in 2-3 years to make sure you are not losing bone. Please get enough calcium and vitamin D in the diet. We recommend 800 to 1000 units Vitamin D and 1200 mg of Calcium per day;  Try to get at least half of this amount from foods (exampe 1.5 servings or more of low fat dairy each day) instead of all your calcium and Vitamin D from supplements. Sunshine is good for Vitamin D. Your risk of breaking a bone is not high enough for prescription medication.

## 2021-06-08 ENCOUNTER — TELEPHONE (OUTPATIENT)
Dept: ADMINISTRATIVE | Age: 81
End: 2021-06-08

## 2021-06-08 PROBLEM — R13.19 ESOPHAGEAL DYSPHAGIA: Status: ACTIVE | Noted: 2021-06-08

## 2021-07-12 NOTE — PROGRESS NOTES
Current Outpatient Medications   Medication Sig Dispense Refill    pantoprazole (PROTONIX) 40 MG tablet TAKE 1 TABLET BY MOUTH EVERY MORNING BEFORE BREAKFAST 90 tablet 3    pitavastatin (LIVALO) 4 MG TABS tablet TAKE 1 TABLET NIGHTLY 90 tablet 3    allopurinol (ZYLOPRIM) 300 MG tablet Take 1 tablet by mouth daily To protect kidneys and prevent joint pain from gout 90 tablet 3    meclizine (ANTIVERT) 12.5 MG tablet Take 1-2 tablets by mouth daily as needed for Dizziness 30 tablet 2    valsartan (DIOVAN) 320 MG tablet Take 1 tablet by mouth daily 90 tablet 3    metoprolol tartrate (LOPRESSOR) 50 MG tablet TAKE 1 TABLET TWICE A  tablet 3    DULoxetine (CYMBALTA) 60 MG extended release capsule TAKE 1 CAPSULE DAILY FOR ARTHRITIS PAIN AND MOOD 90 capsule 3    ELIQUIS 5 MG TABS tablet TAKE 1 TABLET BY MOUTH TWICE DAILY      Biotin 1 MG CAPS Take by mouth      nicotine (NICODERM CQ) 21 MG/24HR Place 1 patch onto the skin daily 30 patch 2    fluticasone (FLONASE) 50 MCG/ACT nasal spray USE 1 SPRAY NASALLY DAILY 48 g 2    acetaminophen (APAP EXTRA STRENGTH) 500 MG tablet Take 2 tablets by mouth 3 times daily as needed for Pain 100 tablet prn    albuterol sulfate HFA (PROAIR HFA) 108 (90 Base) MCG/ACT inhaler Inhale 2 puffs into the lungs every 4 hours as needed for Wheezing or Shortness of Breath (cough, shortness breath or wheezing.) 1 Inhaler 2    promethazine (PHENERGAN) 25 MG tablet Take 0.5 tablets by mouth every 6 hours as needed for Nausea (Patient not taking: Reported on 6/7/2021) 60 tablet 1    Multiple Vitamins-Minerals (THERAPEUTIC MULTIVITAMIN-MINERALS) tablet Take 1 tablet by mouth daily      vitamin B-12 (CYANOCOBALAMIN) 1000 MCG tablet Take 2 tablets by mouth daily 30 tablet     aspirin 81 MG tablet Take 81 mg by mouth daily      nitroGLYCERIN (NITROSTAT) 0.4 MG SL tablet Place 1 tablet under the tongue every 5 minutes as needed for Chest pain (Patient not taking: Reported on 6/7/2021) 25 tablet 0    Omega-3 Krill Oil 300 MG CAPS Take 1 tablet by mouth daily        No current facility-administered medications for this visit. Reviewed with patient and will remain unchanged except as mentioned in A/P  PHYSICAL EXAM     Vitals:    07/28/21 1438   BP: 138/74   Pulse: 58   SpO2: 95%      Gen Alert, coop, no distress Heart  Rrr, no mrg   Head NC, AT, no abnorm Abd  Soft, NT, +BS, no mass, no OM   Eyes PER, conj/corn clear Ext  Ext nl, AT, no C/C/E   Nose Nares nl, no drain, NT Pulse 2+ and symmetric   Throat Lips, mucosa, tongue nl Skin Col/text/turg nl, no vis rash/les   Neck S/S, TM, NT, no bruit/JVD Psych Nl mood and affect   Lung CTA-B, unlabored, no DTP Lymph   No cervical or axillary LA   Ch wall NT, no deform Neuro  Nl gross M/S exam     ASSESSMENT AND PLAN     *CAD   Date EF Results   Sx   No concerning   Hx 3/15  CABGx3 LIMA-LAD, SVG-OM2, SVG-RCA Murl Cogan)   LHC 3/15  MVD->CABG   MPI   No recent   TTE 3/15 60%    Plan   Continue aggressive medical treatment at doses above   *HTN  Status Controlled  Plan Counseled on diet/salt/exercise/weight, continue meds at doses above  *CHOL  Status  Uncontrolled with last LDL of 100(goal <70) and HDL of 47(9/20)  Plan Counseled on diet/exercise/weight, continue statin, lipid/liver surveillance per PCP  *PE  Dx 2/21, on Eliquis  Plan Per pcp, heme  *TOB  Status Active smoker  Plan Continued avoidance of 1st and 2nd hand smoke, counseled on risks/cessation  *OBESITY  Status Uncontrolled with a BMI of Body mass index is 42.97 kg/m². , available historical weights reviewed personally  Plan Counseled on diet, exercise, weight loss options in detai  *COMPLIANCE  Status Compliant  Plan Discussed importance of compliance with meds/diet/salt/exercise; avoid tob/alc/drugs; patient verbalized understanding  *FOLLOWUP  12 months    [address] Dami Richards, am scribing for and in the presence of Adam Koenig MD.   Signed, Dami Brown 07/12/21 11:36 AM   Provider Fracisco Aldana is working as a scribe for and in the presence of me Joan Gutierrez MD). Working as a scribe, Suzy Thomason may have prepopulated components of this note with my historical  intellectual property under my direct supervision. Any additions to this intellectual property were performed in my presence and at my direction. Furthermore, the content and accuracy of this note have been reviewed by me Joan Gutierrez MD).  7/28/2021 11:37 AM  CODING     Category Diagnosis   Stable chronic illness  (66220/24954 - 2 or more) CAD, HTN, CHOL, TOB, OBESITY   Chronic illness with: Exac, progr or SA of Tx  (90122/08324 - 1 or more)    Undiagnosed new problem with: uncertain prognosis  (86755/74539 - 1 or more)    Acute illness with systemic Sx  (53145/08177 - 1 or more)    Acute, complicated injury  (27714/47967 - 1 or more)    72595 1 or more chronic illness with exacerbation, progression or SA of treatment    Time  30-39 minutes spent preparing to see patient including reviewing patient history/prior tests/prior consults, performing a medical exam, counseling and educating patient/family/caregiver, ordering medications/tests/procedures, referring and communicating with PCPs and other pertinent consultants, documenting information in the EMR, independently interpreting results and communicating to family and coordination of patient care.

## 2021-07-28 ENCOUNTER — OFFICE VISIT (OUTPATIENT)
Dept: CARDIOLOGY CLINIC | Age: 81
End: 2021-07-28
Payer: MEDICARE

## 2021-07-28 VITALS
SYSTOLIC BLOOD PRESSURE: 138 MMHG | HEART RATE: 58 BPM | BODY MASS INDEX: 43.19 KG/M2 | DIASTOLIC BLOOD PRESSURE: 74 MMHG | OXYGEN SATURATION: 95 % | WEIGHT: 220 LBS | HEIGHT: 60 IN

## 2021-07-28 DIAGNOSIS — Z72.0 TOBACCO ABUSE: ICD-10-CM

## 2021-07-28 DIAGNOSIS — E66.01 CLASS 3 SEVERE OBESITY DUE TO EXCESS CALORIES WITH BODY MASS INDEX (BMI) OF 40.0 TO 44.9 IN ADULT, UNSPECIFIED WHETHER SERIOUS COMORBIDITY PRESENT (HCC): ICD-10-CM

## 2021-07-28 DIAGNOSIS — I25.83 CORONARY ARTERY DISEASE DUE TO LIPID RICH PLAQUE: Primary | ICD-10-CM

## 2021-07-28 DIAGNOSIS — E78.00 HYPERCHOLESTEREMIA: ICD-10-CM

## 2021-07-28 DIAGNOSIS — I10 ESSENTIAL HYPERTENSION: ICD-10-CM

## 2021-07-28 DIAGNOSIS — I25.10 CORONARY ARTERY DISEASE DUE TO LIPID RICH PLAQUE: Primary | ICD-10-CM

## 2021-07-28 PROCEDURE — 1123F ACP DISCUSS/DSCN MKR DOCD: CPT | Performed by: INTERNAL MEDICINE

## 2021-07-28 PROCEDURE — 4040F PNEUMOC VAC/ADMIN/RCVD: CPT | Performed by: INTERNAL MEDICINE

## 2021-07-28 PROCEDURE — 1090F PRES/ABSN URINE INCON ASSESS: CPT | Performed by: INTERNAL MEDICINE

## 2021-07-28 PROCEDURE — G8417 CALC BMI ABV UP PARAM F/U: HCPCS | Performed by: INTERNAL MEDICINE

## 2021-07-28 PROCEDURE — G8399 PT W/DXA RESULTS DOCUMENT: HCPCS | Performed by: INTERNAL MEDICINE

## 2021-07-28 PROCEDURE — 4004F PT TOBACCO SCREEN RCVD TLK: CPT | Performed by: INTERNAL MEDICINE

## 2021-07-28 PROCEDURE — G8427 DOCREV CUR MEDS BY ELIG CLIN: HCPCS | Performed by: INTERNAL MEDICINE

## 2021-07-28 PROCEDURE — 99214 OFFICE O/P EST MOD 30 MIN: CPT | Performed by: INTERNAL MEDICINE

## 2021-07-28 RX ORDER — OMEPRAZOLE 20 MG/1
20 CAPSULE, DELAYED RELEASE ORAL DAILY
COMMUNITY
End: 2021-10-07

## 2021-08-26 DIAGNOSIS — J31.0 CHRONIC RHINITIS: ICD-10-CM

## 2021-08-26 RX ORDER — FLUTICASONE PROPIONATE 50 MCG
SPRAY, SUSPENSION (ML) NASAL
Qty: 1 BOTTLE | Refills: 2 | Status: SHIPPED | OUTPATIENT
Start: 2021-08-26 | End: 2022-02-22

## 2021-10-07 ENCOUNTER — OFFICE VISIT (OUTPATIENT)
Dept: INTERNAL MEDICINE CLINIC | Age: 81
End: 2021-10-07
Payer: MEDICARE

## 2021-10-07 VITALS
SYSTOLIC BLOOD PRESSURE: 126 MMHG | HEART RATE: 61 BPM | DIASTOLIC BLOOD PRESSURE: 68 MMHG | HEIGHT: 60 IN | BODY MASS INDEX: 43.39 KG/M2 | WEIGHT: 221 LBS

## 2021-10-07 DIAGNOSIS — I25.10 CORONARY ARTERY DISEASE DUE TO LIPID RICH PLAQUE: ICD-10-CM

## 2021-10-07 DIAGNOSIS — I65.23 ATHEROSCLEROSIS OF BOTH CAROTID ARTERIES: ICD-10-CM

## 2021-10-07 DIAGNOSIS — E66.01 CLASS 3 SEVERE OBESITY DUE TO EXCESS CALORIES WITH SERIOUS COMORBIDITY AND BODY MASS INDEX (BMI) OF 40.0 TO 44.9 IN ADULT (HCC): ICD-10-CM

## 2021-10-07 DIAGNOSIS — D50.9 IRON DEFICIENCY ANEMIA, UNSPECIFIED IRON DEFICIENCY ANEMIA TYPE: ICD-10-CM

## 2021-10-07 DIAGNOSIS — E78.00 HYPERCHOLESTEREMIA: ICD-10-CM

## 2021-10-07 DIAGNOSIS — Z23 NEED FOR INFLUENZA VACCINATION: Primary | ICD-10-CM

## 2021-10-07 DIAGNOSIS — E79.0 HYPERURICEMIA: ICD-10-CM

## 2021-10-07 DIAGNOSIS — F17.200 TOBACCO DEPENDENCE: ICD-10-CM

## 2021-10-07 DIAGNOSIS — J44.9 COPD, MILD (HCC): ICD-10-CM

## 2021-10-07 DIAGNOSIS — K21.00 GASTROESOPHAGEAL REFLUX DISEASE WITH ESOPHAGITIS WITHOUT HEMORRHAGE: ICD-10-CM

## 2021-10-07 DIAGNOSIS — N18.31 TYPE 2 DIABETES MELLITUS WITH STAGE 3A CHRONIC KIDNEY DISEASE, WITHOUT LONG-TERM CURRENT USE OF INSULIN (HCC): ICD-10-CM

## 2021-10-07 DIAGNOSIS — E11.22 TYPE 2 DIABETES MELLITUS WITH STAGE 3A CHRONIC KIDNEY DISEASE, WITHOUT LONG-TERM CURRENT USE OF INSULIN (HCC): ICD-10-CM

## 2021-10-07 DIAGNOSIS — I25.83 CORONARY ARTERY DISEASE DUE TO LIPID RICH PLAQUE: ICD-10-CM

## 2021-10-07 LAB
BASOPHILS ABSOLUTE: 0.1 K/UL (ref 0–0.2)
BASOPHILS RELATIVE PERCENT: 0.7 %
CREATININE URINE: 202.1 MG/DL (ref 28–259)
EOSINOPHILS ABSOLUTE: 0.3 K/UL (ref 0–0.6)
EOSINOPHILS RELATIVE PERCENT: 2.9 %
HCT VFR BLD CALC: 42.4 % (ref 36–48)
HEMOGLOBIN: 13.4 G/DL (ref 12–16)
LYMPHOCYTES ABSOLUTE: 3.6 K/UL (ref 1–5.1)
LYMPHOCYTES RELATIVE PERCENT: 30.4 %
MCH RBC QN AUTO: 29.4 PG (ref 26–34)
MCHC RBC AUTO-ENTMCNC: 31.6 G/DL (ref 31–36)
MCV RBC AUTO: 93.1 FL (ref 80–100)
MICROALBUMIN UR-MCNC: 2.3 MG/DL
MICROALBUMIN/CREAT UR-RTO: 11.4 MG/G (ref 0–30)
MONOCYTES ABSOLUTE: 0.9 K/UL (ref 0–1.3)
MONOCYTES RELATIVE PERCENT: 7.5 %
NEUTROPHILS ABSOLUTE: 7 K/UL (ref 1.7–7.7)
NEUTROPHILS RELATIVE PERCENT: 58.5 %
PDW BLD-RTO: 18 % (ref 12.4–15.4)
PLATELET # BLD: 307 K/UL (ref 135–450)
PMV BLD AUTO: 8.5 FL (ref 5–10.5)
RBC # BLD: 4.55 M/UL (ref 4–5.2)
WBC # BLD: 11.9 K/UL (ref 4–11)

## 2021-10-07 PROCEDURE — G8417 CALC BMI ABV UP PARAM F/U: HCPCS | Performed by: FAMILY MEDICINE

## 2021-10-07 PROCEDURE — G0008 ADMIN INFLUENZA VIRUS VAC: HCPCS | Performed by: FAMILY MEDICINE

## 2021-10-07 PROCEDURE — 90694 VACC AIIV4 NO PRSRV 0.5ML IM: CPT | Performed by: FAMILY MEDICINE

## 2021-10-07 PROCEDURE — 3023F SPIROM DOC REV: CPT | Performed by: FAMILY MEDICINE

## 2021-10-07 PROCEDURE — G8399 PT W/DXA RESULTS DOCUMENT: HCPCS | Performed by: FAMILY MEDICINE

## 2021-10-07 PROCEDURE — G8926 SPIRO NO PERF OR DOC: HCPCS | Performed by: FAMILY MEDICINE

## 2021-10-07 PROCEDURE — 4004F PT TOBACCO SCREEN RCVD TLK: CPT | Performed by: FAMILY MEDICINE

## 2021-10-07 PROCEDURE — G8427 DOCREV CUR MEDS BY ELIG CLIN: HCPCS | Performed by: FAMILY MEDICINE

## 2021-10-07 PROCEDURE — G8484 FLU IMMUNIZE NO ADMIN: HCPCS | Performed by: FAMILY MEDICINE

## 2021-10-07 PROCEDURE — 1090F PRES/ABSN URINE INCON ASSESS: CPT | Performed by: FAMILY MEDICINE

## 2021-10-07 PROCEDURE — 4040F PNEUMOC VAC/ADMIN/RCVD: CPT | Performed by: FAMILY MEDICINE

## 2021-10-07 PROCEDURE — 1123F ACP DISCUSS/DSCN MKR DOCD: CPT | Performed by: FAMILY MEDICINE

## 2021-10-07 PROCEDURE — 99214 OFFICE O/P EST MOD 30 MIN: CPT | Performed by: FAMILY MEDICINE

## 2021-10-07 RX ORDER — PANTOPRAZOLE SODIUM 40 MG/1
TABLET, DELAYED RELEASE ORAL
COMMUNITY
Start: 2021-08-28 | End: 2022-06-13

## 2021-10-07 SDOH — ECONOMIC STABILITY: FOOD INSECURITY: WITHIN THE PAST 12 MONTHS, YOU WORRIED THAT YOUR FOOD WOULD RUN OUT BEFORE YOU GOT MONEY TO BUY MORE.: NEVER TRUE

## 2021-10-07 SDOH — ECONOMIC STABILITY: FOOD INSECURITY: WITHIN THE PAST 12 MONTHS, THE FOOD YOU BOUGHT JUST DIDN'T LAST AND YOU DIDN'T HAVE MONEY TO GET MORE.: NEVER TRUE

## 2021-10-07 ASSESSMENT — SOCIAL DETERMINANTS OF HEALTH (SDOH): HOW HARD IS IT FOR YOU TO PAY FOR THE VERY BASICS LIKE FOOD, HOUSING, MEDICAL CARE, AND HEATING?: NOT HARD AT ALL

## 2021-10-07 NOTE — PROGRESS NOTES
10/7/2021  HERE WITH DAUGHTER MICHAEL Lipscomb (:  1940) is a [de-identified] y.o. female, here for evaluation of the following chief complaint(s): Other (4 mo f/u for smoking cessation- Pt currently smoking 1/2 ppd.) and Dizziness      ASSESSMENT/PLAN:    1. Type 2 diabetes mellitus with stage 3a chronic kidney disease, without long-term current use of insulin ([address]  Lab Results   Component Value Date    LABA1C 6.2 2021     Still diet controlled. - Comprehensive Metabolic Panel  - Hemoglobin A1C  - CBC Auto Differential  - TSH with Reflex  - Microalbumin / Creatinine Urine Ratio    2. Gastroesophageal reflux disease with esophagitis without hemorrhage  On daily PPI. For EGD later this month  - CBC Auto Differential  - Magnesium  - Vitamin B12    3. Need for influenza vaccination  - INFLUENZA, QUADV, ADJUVANTED, 65 YRS =, IM, PF, PREFILL SYR, 0.5ML (FLUAD)    4. COPD, mild ([address]  Smoking cessation discussed. 5. Atherosclerosis of both carotid arteries  Patient is on statin and trying to work on cessation. 6. Coronary artery disease due to lipid rich plaque  On statin. H/o stents      7. Hypercholesteremia  LDL is still above goal.  Med compliance encouraged. - Lipid Panel  --- not fasting due to difficult to get out to lab fasting. 8. Iron deficiency anemia, unspecified iron deficiency anemia type  Lab Results   Component Value Date    HGB 12.4 2021   may be OK.   - CBC Auto Differential    9. Hyperuricemia  Lab Results   Component Value Date    LABURIC 9.2 (H) 2021     No gout symptoms lately. - Uric Acid    10. Tobacco dependence  Encouraged to consider cutting back to 9 cigs per day for a week, then 8 cigs per day  And work down gradually. She started to react to nicotine patches.      11. Class 3 severe obesity due to excess calories with serious comorbidity and body mass index (BMI) of 40.0 to 44.9 in adult ([facility])  - TSH with Reflex        FOLLOW UP:  .Return in (NITROSTAT) 0.4 MG SL tablet Place 1 tablet under the tongue every 5 minutes as needed for Chest pain 25 tablet 0         Review of Systems        Vitals:    10/07/21 1255   BP: 126/68   Pulse: 61   Weight: 221 lb (100.2 kg)   Height: 5' (1.524 m)       Wt Readings from Last 3 Encounters:   10/07/21 221 lb (100.2 kg)   07/28/21 220 lb (99.8 kg)   06/07/21 219 lb (99.3 kg)       BP Readings from Last 3 Encounters:   10/07/21 126/68   07/28/21 138/74   06/07/21 134/80       Physical Exam  Vitals reviewed. Constitutional:       General: She is not in acute distress. Appearance: Normal appearance. She is well-developed. She is obese. She is not diaphoretic. Comments: Pear shaped obesity   Eyes:      General: No scleral icterus. Neck:      Thyroid: No thyroid mass or thyromegaly. Vascular: No carotid bruit. Cardiovascular:      Rate and Rhythm: Normal rate and regular rhythm. Heart sounds: Normal heart sounds, S1 normal and S2 normal. No murmur heard. Pulmonary:      Effort: Pulmonary effort is normal. No respiratory distress. Breath sounds: Normal breath sounds. No decreased breath sounds, wheezing, rhonchi or rales. Abdominal:      General: Bowel sounds are normal. There is no abdominal bruit. Palpations: Abdomen is soft. There is no hepatomegaly or mass. Tenderness: There is no abdominal tenderness. Musculoskeletal:      Cervical back: Neck supple. Right lower leg: No edema. Left lower leg: No edema. Lymphadenopathy:      Cervical: No cervical adenopathy. Skin:     General: Skin is warm and dry. Coloration: Skin is not pale. Nails: There is no clubbing. Neurological:      Mental Status: She is alert and oriented to person, place, and time. Motor: No tremor or abnormal muscle tone.       Coordination: Coordination normal.      Gait: Gait normal.   Psychiatric:         Speech: Speech normal.         Behavior: Behavior normal.           An electronic signature was used to authenticate this note.     Larisa Dailey MD

## 2021-10-07 NOTE — PATIENT INSTRUCTIONS
Cortaid or Cortizone 10 over the counter are good to keep on hand.     Off aspirin a week before and Eliquis 2 days before the endoscopy

## 2021-10-08 LAB
A/G RATIO: 1.8 (ref 1.1–2.2)
ALBUMIN SERPL-MCNC: 4.4 G/DL (ref 3.4–5)
ALP BLD-CCNC: 137 U/L (ref 40–129)
ALT SERPL-CCNC: 14 U/L (ref 10–40)
ANION GAP SERPL CALCULATED.3IONS-SCNC: 16 MMOL/L (ref 3–16)
AST SERPL-CCNC: 16 U/L (ref 15–37)
BILIRUB SERPL-MCNC: 0.4 MG/DL (ref 0–1)
BUN BLDV-MCNC: 24 MG/DL (ref 7–20)
CALCIUM SERPL-MCNC: 10.3 MG/DL (ref 8.3–10.6)
CHLORIDE BLD-SCNC: 104 MMOL/L (ref 99–110)
CHOLESTEROL, TOTAL: 178 MG/DL (ref 0–199)
CO2: 22 MMOL/L (ref 21–32)
CREAT SERPL-MCNC: 1.1 MG/DL (ref 0.6–1.2)
ESTIMATED AVERAGE GLUCOSE: 122.6 MG/DL
GFR AFRICAN AMERICAN: 58
GFR NON-AFRICAN AMERICAN: 48
GLOBULIN: 2.4 G/DL
GLUCOSE BLD-MCNC: 112 MG/DL (ref 70–99)
HBA1C MFR BLD: 5.9 %
HDLC SERPL-MCNC: 44 MG/DL (ref 40–60)
LDL CHOLESTEROL CALCULATED: 93 MG/DL
MAGNESIUM: 1.9 MG/DL (ref 1.8–2.4)
POTASSIUM SERPL-SCNC: 4.3 MMOL/L (ref 3.5–5.1)
SODIUM BLD-SCNC: 142 MMOL/L (ref 136–145)
TOTAL PROTEIN: 6.8 G/DL (ref 6.4–8.2)
TRIGL SERPL-MCNC: 203 MG/DL (ref 0–150)
TSH REFLEX: 3.47 UIU/ML (ref 0.27–4.2)
URIC ACID, SERUM: 4.5 MG/DL (ref 2.6–6)
VITAMIN B-12: 1407 PG/ML (ref 211–911)
VLDLC SERPL CALC-MCNC: 41 MG/DL

## 2021-10-11 RX ORDER — KRILL/OM-3/DHA/EPA/PHOSPHO/AST 500MG-86MG
CAPSULE ORAL DAILY
COMMUNITY
End: 2022-07-28

## 2021-10-14 ENCOUNTER — HOSPITAL ENCOUNTER (OUTPATIENT)
Age: 81
Discharge: HOME | End: 2021-10-14
Payer: MEDICARE

## 2021-10-14 DIAGNOSIS — Z01.818 PREOP TESTING: ICD-10-CM

## 2021-10-14 PROCEDURE — U0003 INFECTIOUS AGENT DETECTION BY NUCLEIC ACID (DNA OR RNA); SEVERE ACUTE RESPIRATORY SYNDROME CORONAVIRUS 2 (SARS-COV-2) (CORONAVIRUS DISEASE [COVID-19]), AMPLIFIED PROBE TECHNIQUE, MAKING USE OF HIGH THROUGHPUT TECHNOLOGIES AS DESCRIBED BY CMS-2020-01-R: HCPCS

## 2021-10-14 PROCEDURE — U0005 INFEC AGEN DETEC AMPLI PROBE: HCPCS

## 2021-10-15 LAB — SARS-COV-2: NOT DETECTED

## 2021-10-20 ENCOUNTER — ANESTHESIA (OUTPATIENT)
Dept: ENDOSCOPY | Age: 81
End: 2021-10-20
Payer: MEDICARE

## 2021-10-20 ENCOUNTER — ANESTHESIA EVENT (OUTPATIENT)
Dept: ENDOSCOPY | Age: 81
End: 2021-10-20
Payer: MEDICARE

## 2021-10-20 ENCOUNTER — HOSPITAL ENCOUNTER (OUTPATIENT)
Age: 81
Setting detail: OUTPATIENT SURGERY
Discharge: HOME | End: 2021-10-20
Attending: INTERNAL MEDICINE | Admitting: INTERNAL MEDICINE
Payer: MEDICARE

## 2021-10-20 VITALS
RESPIRATION RATE: 20 BRPM | OXYGEN SATURATION: 99 % | SYSTOLIC BLOOD PRESSURE: 117 MMHG | DIASTOLIC BLOOD PRESSURE: 91 MMHG

## 2021-10-20 VITALS
RESPIRATION RATE: 14 BRPM | WEIGHT: 221 LBS | TEMPERATURE: 96.5 F | SYSTOLIC BLOOD PRESSURE: 151 MMHG | OXYGEN SATURATION: 100 % | HEIGHT: 60 IN | HEART RATE: 57 BPM | DIASTOLIC BLOOD PRESSURE: 83 MMHG | BODY MASS INDEX: 43.39 KG/M2

## 2021-10-20 DIAGNOSIS — Z01.818 PREOP TESTING: Primary | ICD-10-CM

## 2021-10-20 LAB
GLUCOSE BLD-MCNC: 107 MG/DL (ref 70–99)
GLUCOSE BLD-MCNC: 117 MG/DL (ref 70–99)
PERFORMED ON: ABNORMAL
PERFORMED ON: ABNORMAL

## 2021-10-20 PROCEDURE — 3700000000 HC ANESTHESIA ATTENDED CARE: Performed by: INTERNAL MEDICINE

## 2021-10-20 PROCEDURE — 3609012400 HC EGD TRANSORAL BIOPSY SINGLE/MULTIPLE: Performed by: INTERNAL MEDICINE

## 2021-10-20 PROCEDURE — 6360000002 HC RX W HCPCS: Performed by: NURSE ANESTHETIST, CERTIFIED REGISTERED

## 2021-10-20 PROCEDURE — 2709999900 HC NON-CHARGEABLE SUPPLY: Performed by: INTERNAL MEDICINE

## 2021-10-20 PROCEDURE — C1726 CATH, BAL DIL, NON-VASCULAR: HCPCS | Performed by: INTERNAL MEDICINE

## 2021-10-20 PROCEDURE — 2580000003 HC RX 258: Performed by: ANESTHESIOLOGY

## 2021-10-20 PROCEDURE — 88305 TISSUE EXAM BY PATHOLOGIST: CPT

## 2021-10-20 PROCEDURE — 3609017700 HC EGD DILATION GASTRIC/DUODENAL STRICTURE: Performed by: INTERNAL MEDICINE

## 2021-10-20 PROCEDURE — 7100000010 HC PHASE II RECOVERY - FIRST 15 MIN: Performed by: INTERNAL MEDICINE

## 2021-10-20 PROCEDURE — 7100000011 HC PHASE II RECOVERY - ADDTL 15 MIN: Performed by: INTERNAL MEDICINE

## 2021-10-20 PROCEDURE — 2500000003 HC RX 250 WO HCPCS: Performed by: NURSE ANESTHETIST, CERTIFIED REGISTERED

## 2021-10-20 PROCEDURE — 3700000001 HC ADD 15 MINUTES (ANESTHESIA): Performed by: INTERNAL MEDICINE

## 2021-10-20 RX ORDER — LIDOCAINE HYDROCHLORIDE 20 MG/ML
INJECTION, SOLUTION INFILTRATION; PERINEURAL PRN
Status: DISCONTINUED | OUTPATIENT
Start: 2021-10-20 | End: 2021-10-20 | Stop reason: SDUPTHER

## 2021-10-20 RX ORDER — SODIUM CHLORIDE 9 MG/ML
INJECTION, SOLUTION INTRAVENOUS CONTINUOUS
Status: DISCONTINUED | OUTPATIENT
Start: 2021-10-20 | End: 2021-10-20 | Stop reason: HOSPADM

## 2021-10-20 RX ORDER — PROPOFOL 10 MG/ML
INJECTION, EMULSION INTRAVENOUS PRN
Status: DISCONTINUED | OUTPATIENT
Start: 2021-10-20 | End: 2021-10-20 | Stop reason: SDUPTHER

## 2021-10-20 RX ADMIN — PHENYLEPHRINE HYDROCHLORIDE 100 MCG: 10 INJECTION INTRAVENOUS at 10:51

## 2021-10-20 RX ADMIN — PROPOFOL 50 MG: 10 INJECTION, EMULSION INTRAVENOUS at 10:46

## 2021-10-20 RX ADMIN — PHENYLEPHRINE HYDROCHLORIDE 100 MCG: 10 INJECTION INTRAVENOUS at 10:48

## 2021-10-20 RX ADMIN — PROPOFOL 50 MG: 10 INJECTION, EMULSION INTRAVENOUS at 10:56

## 2021-10-20 RX ADMIN — LIDOCAINE HYDROCHLORIDE 80 MG: 20 INJECTION, SOLUTION INFILTRATION; PERINEURAL at 10:44

## 2021-10-20 RX ADMIN — PROPOFOL 50 MG: 10 INJECTION, EMULSION INTRAVENOUS at 10:51

## 2021-10-20 RX ADMIN — SODIUM CHLORIDE: 9 INJECTION, SOLUTION INTRAVENOUS at 10:05

## 2021-10-20 RX ADMIN — PROPOFOL 50 MG: 10 INJECTION, EMULSION INTRAVENOUS at 10:44

## 2021-10-20 ASSESSMENT — PAIN - FUNCTIONAL ASSESSMENT: PAIN_FUNCTIONAL_ASSESSMENT: 0-10

## 2021-10-20 ASSESSMENT — PAIN SCALES - GENERAL: PAINLEVEL_OUTOF10: 0

## 2021-10-20 NOTE — H&P
Gastroenterology Note                 Pre-operative History and Physical    Patient: Renea Cartagena  : 1940  CSN:     History Obtained From:   Patient or guardian. HISTORY OF PRESENT ILLNESS:    The patient is a 80 y.o. female here for Endoscopy. Past Medical History:    Past Medical History:   Diagnosis Date    [facility])     CAD (coronary artery disease)     [facility]     Depression 2017    Diabetes mellitus [facility]     Hearing impaired     Hiatal hernia     Hyperlipidemia     true allergy to statins    Hypertension     Kidney stone 2013    Malignant melanoma (HCC)     Osteoarthritis     Otosclerosis     Dr. Christa Martinez    Pneumonia     Pulmonary embolism without acute cor pulmonale ([facility] 2021    Unprovoked but neg thrombophilia work up.  Stented coronary artery      Past Surgical History:    Past Surgical History:   Procedure Laterality Date    COLONOSCOPY      CORONARY ANGIOPLASTY WITH STENT PLACEMENT      CORONARY ARTERY BYPASS GRAFT  03/17/15    Dr. Sanz Anny - x 3 LIMA-LAD, SV-OM2, SV-RCA    ENDOSCOPY, COLON, DIAGNOSTIC      KNEE SURGERY      SKIN BIOPSY      arm,back, upper left arm    SKIN CANCER EXCISION  <2000    Left upper arm;  Melanoma    STAPEDES SURGERY  [id number]    Dr. Pancho Aleman     Medications Prior to Admission:   No current facility-administered medications on file prior to encounter.      Current Outpatient Medications on File Prior to Encounter   Medication Sig Dispense Refill    valsartan (DIOVAN) 320 MG tablet Take 1 tablet by mouth daily 90 tablet 3    albuterol sulfate HFA (PROAIR HFA) 108 (90 Base) MCG/ACT inhaler Inhale 2 puffs into the lungs every 4 hours as needed for Wheezing or Shortness of Breath (cough, shortness breath or wheezing.) 1 Inhaler 2    aspirin 81 MG tablet Take 81 mg by mouth daily      Krill Oil 500 MG CAPS Take by mouth daily      CTA and normal effort    Abdomen:   Soft, nt nd. ASSESSMENT AND PLAN:    1. Patient is a 80 y.o. female here for endoscopy with MAC sedation. 2.  Procedure options, risks and benefits reviewed with patient and/or guardian. They express understanding.

## 2021-10-20 NOTE — ANESTHESIA PRE PROCEDURE
Multiple Vitamins-Minerals (THERAPEUTIC MULTIVITAMIN-MINERALS) tablet Take 1 tablet by mouth daily    Historical Provider, MD   vitamin B-12 (CYANOCOBALAMIN) 1000 MCG tablet Take 2 tablets by mouth daily 3/18/17   Suzanne Burris MD   aspirin 81 MG tablet Take 81 mg by mouth daily    Historical Provider, MD   nitroGLYCERIN (NITROSTAT) 0.4 MG SL tablet Place 1 tablet under the tongue every 5 minutes as needed for Chest pain 5/10/16   Suzanne Burris MD       Current medications:    Current Facility-Administered Medications   Medication Dose Route Frequency Provider Last Rate Last Admin    0.9 % sodium chloride infusion   IntraVENous Continuous Georges Bingham MD           Allergies: Allergies   Allergen Reactions    Atorvastatin      General rash, previously tolerated simvastatin    Pcn [Penicillins] Hives    Rosuvastatin      myalgias    Simvastatin      myalgias    Tetanus Toxoids Itching    Statins Rash     Rash with Lipitor, Crestor, Zocor;  Probably pravastatin.        Problem List:    Patient Active Problem List   Diagnosis Code    Hypercholesteremia E78.00    [facility] E70.30    Coronary artery disease due to lipid rich plaque I25.10, I25.83    Hearing impaired H91.90    Vertigo R42    GERD (gastroesophageal reflux disease) K21.9    Intertrigo L30.4    Abnormal EKG R94.31    Essential hypertension I10    History of melanoma Z85.820    Iron deficiency anemia D50.9    Tobacco dependence F17.200    Sacroiliac pain M53.3    Chronic rhinitis J31.0    B12 deficiency E53.8    Blepharitis of lower eyelids of both eyes H01.002, H01.005    COPD, mild (HCC) J44.9    Recurrent major depression in remission ([facility] F33.40    Ganglion cyst of volar aspect of left wrist M67.432    Chronic kidney disease, stage III (moderate) (HCC) N18.30    Hyperuricemia E79.0    Diabetes mellitus type 2, diet-controlled (HCC) E11.9    Class 3 severe obesity due to excess calories with body mass index (BMI) of 40.0 to 44.9 in adult ([facility]) E66.01, Z68.41    Pulmonary embolism without acute cor pulmonale ([facility]) I26.99    Atherosclerosis of both carotid arteries I65.23    Mild mitral regurgitation  2014  I34.0    Occlusion and stenosis of carotid artery I65.29    Esophageal dysphagia R13.19    Type 2 diabetes mellitus with chronic kidney disease E11.22       Past Medical History:        Diagnosis Date    [facility])     CAD (coronary artery disease)     [facility] disease) [facility]     Depression 11/27/2017    Diabetes mellitus ([facility]     Hearing impaired     Hiatal hernia     Hyperlipidemia     true allergy to statins    Hypertension     Kidney stone 02/03/2013    Malignant melanoma ([facility])     Osteoarthritis     Otosclerosis     Dr. Elisabet Wilson    Pneumonia     Pulmonary embolism without acute cor pulmonale ([facility] 2/9/2021    Unprovoked but neg thrombophilia work up.  Stented coronary artery        Past Surgical History:        Procedure Laterality Date    COLONOSCOPY      CORONARY ANGIOPLASTY WITH STENT PLACEMENT      CORONARY ARTERY BYPASS GRAFT  03/17/15    Dr. Traylor Diver - x 3 LIMA-LAD, SV-OM2, SV-RCA    ENDOSCOPY, COLON, DIAGNOSTIC      KNEE SURGERY      SKIN BIOPSY      arm,back, upper left arm    SKIN CANCER EXCISION  <2000    Left upper arm;  Melanoma    STAPEDES SURGERY  [id number]    Dr. Glenna Shepherd       Social History:    Social History     Tobacco Use    Smoking status: Current Every Day Smoker     Packs/day: 1.00     Years: 60.00     Pack years: 60.00     Types: Cigarettes    Smokeless tobacco: Never Used    Tobacco comment: weaning down 6/2021; 10/21 at 1/2 ppd. Substance Use Topics    Alcohol use: No     Alcohol/week: 0.0 standard drinks                                Ready to quit: Not Answered  Counseling given: Not Answered  Comment: weaning down 6/2021; 10/21 at 1/2 ppd.       Vital Signs (Current):   Vitals:    10/11/21 Anesthesia Evaluation  Patient summary reviewed and Nursing notes reviewed  Airway: Mallampati: II        Dental:          Pulmonary:   (+) pneumonia:  COPD:                             Cardiovascular:    (+) hypertension:, CAD:,                   Neuro/Psych:   (+) psychiatric history:            GI/Hepatic/Renal:   (+) hiatal hernia, GERD:,           Endo/Other:    (+) Diabetes, . Abdominal:             Vascular:           Other Findings:             Anesthesia Plan      MAC     ASA 3                                 David Mckeon MD   10/20/2021

## 2021-10-20 NOTE — PROGRESS NOTES
Dr. Lewis Hartman spoke with patient and daughter at bedside about procedure.
Patient doing well. Respirations easy on room air. Denies any discomfort. Fluid intake good. Daughter at bedside.
Post-procedure education reviewed with patient and visitor, and they verbalized understanding.
To endo recovery from procedure room. VSS, awakens to voice, respirations easy and unlabored. Blood sugar 105.   VSS
and if social distancing can be maintained. The policy is subject to change at any time. Please make sure the visitor has a cell phone that is on,charged and able to accept calls, as this may be the way that the staff communicates with them. Pain management is NO VISITOR policyThe patients ride is expected to remain in the car with a cell phone for communication. If the ride is leaving the hospital grounds please make sure they are back in time for pickup. Have the patient inform the staff on arrival what their rides plans are while the patient is in the facility. At the MAIN there is one visitor allowed. Please note that the visitor policy is subject to change.

## 2021-10-20 NOTE — ANESTHESIA POSTPROCEDURE EVALUATION
Department of Anesthesiology  Postprocedure Note    Patient: Cinthya Daniel  MRN: [id number]  YOB: 1940  Date of evaluation: 10/20/2021  Time:  11:31 AM     Procedure Summary     Date: 10/20/21 Room / Location: [address] [place] [address]    Anesthesia Start: 9223 Anesthesia Stop: 1107    Procedures:       EGD ESOPHAGEAL  DILATION BALLOON 16.5 mm UPPER AND LOWER ESOPHAGUS (N/A Abdomen)      EGD BIOPSY GEJ RING (N/A Abdomen) Diagnosis: (DYSPHAGIA, SCHEDULE EGD R13.10)    Surgeons: Edie Gary MD Responsible Provider: Andrey Haque MD    Anesthesia Type: MAC ASA Status: 3          Anesthesia Type: MAC    Gagandeep Phase I: Gagandeep Score: 10    Gagandeep Phase II: Gagandeep Score: 10    Last vitals: Reviewed and per EMR flowsheets.        Anesthesia Post Evaluation    Patient location during evaluation: PACU  Patient participation: complete - patient participated  Level of consciousness: awake  Airway patency: patent  Nausea & Vomiting: no nausea and no vomiting  Complications: no  Cardiovascular status: blood pressure returned to baseline  Respiratory status: acceptable  Hydration status: euvolemic

## 2022-02-10 DIAGNOSIS — F33.0 MILD EPISODE OF RECURRENT MAJOR DEPRESSIVE DISORDER (HCC): ICD-10-CM

## 2022-02-10 RX ORDER — METOPROLOL TARTRATE 50 MG/1
TABLET, FILM COATED ORAL
Qty: 180 TABLET | Refills: 3 | Status: SHIPPED | OUTPATIENT
Start: 2022-02-10 | End: 2022-06-10 | Stop reason: SDUPTHER

## 2022-02-10 RX ORDER — DULOXETIN HYDROCHLORIDE 60 MG/1
CAPSULE, DELAYED RELEASE ORAL
Qty: 90 CAPSULE | Refills: 3 | Status: SHIPPED | OUTPATIENT
Start: 2022-02-10 | End: 2022-06-10 | Stop reason: SDUPTHER

## 2022-02-22 DIAGNOSIS — J31.0 CHRONIC RHINITIS: ICD-10-CM

## 2022-02-22 RX ORDER — FLUTICASONE PROPIONATE 50 MCG
SPRAY, SUSPENSION (ML) NASAL
Qty: 16 G | Refills: 5 | Status: SHIPPED | OUTPATIENT
Start: 2022-02-22

## 2022-03-04 ENCOUNTER — OFFICE VISIT (OUTPATIENT)
Dept: INTERNAL MEDICINE CLINIC | Age: 82
End: 2022-03-04
Payer: MEDICARE

## 2022-03-04 VITALS
BODY MASS INDEX: 44.21 KG/M2 | SYSTOLIC BLOOD PRESSURE: 126 MMHG | DIASTOLIC BLOOD PRESSURE: 70 MMHG | HEART RATE: 64 BPM | WEIGHT: 225.2 LBS | OXYGEN SATURATION: 96 % | HEIGHT: 60 IN

## 2022-03-04 DIAGNOSIS — E11.22 TYPE 2 DIABETES MELLITUS WITH STAGE 3A CHRONIC KIDNEY DISEASE, WITHOUT LONG-TERM CURRENT USE OF INSULIN (HCC): Primary | ICD-10-CM

## 2022-03-04 DIAGNOSIS — I10 ESSENTIAL HYPERTENSION: ICD-10-CM

## 2022-03-04 DIAGNOSIS — I26.99 OTHER ACUTE PULMONARY EMBOLISM WITHOUT ACUTE COR PULMONALE (HCC): ICD-10-CM

## 2022-03-04 DIAGNOSIS — E79.0 HYPERURICEMIA: ICD-10-CM

## 2022-03-04 DIAGNOSIS — I25.10 CORONARY ARTERY DISEASE INVOLVING NATIVE CORONARY ARTERY OF NATIVE HEART WITHOUT ANGINA PECTORIS: ICD-10-CM

## 2022-03-04 DIAGNOSIS — I25.83 CORONARY ARTERY DISEASE DUE TO LIPID RICH PLAQUE: ICD-10-CM

## 2022-03-04 DIAGNOSIS — J44.9 COPD, MILD (HCC): ICD-10-CM

## 2022-03-04 DIAGNOSIS — Z79.899 CURRENT USE OF PROTON PUMP INHIBITOR: ICD-10-CM

## 2022-03-04 DIAGNOSIS — E70.30 ALBINOIDISM (HCC): ICD-10-CM

## 2022-03-04 DIAGNOSIS — F33.41 RECURRENT MAJOR DEPRESSIVE DISORDER, IN PARTIAL REMISSION (HCC): ICD-10-CM

## 2022-03-04 DIAGNOSIS — E66.01 OBESITY, CLASS III, BMI 40-49.9 (MORBID OBESITY) (HCC): ICD-10-CM

## 2022-03-04 DIAGNOSIS — E78.00 HYPERCHOLESTEREMIA: ICD-10-CM

## 2022-03-04 DIAGNOSIS — E55.9 VITAMIN D DEFICIENCY: ICD-10-CM

## 2022-03-04 DIAGNOSIS — I25.10 CORONARY ARTERY DISEASE DUE TO LIPID RICH PLAQUE: ICD-10-CM

## 2022-03-04 DIAGNOSIS — N18.31 TYPE 2 DIABETES MELLITUS WITH STAGE 3A CHRONIC KIDNEY DISEASE, WITHOUT LONG-TERM CURRENT USE OF INSULIN (HCC): Primary | ICD-10-CM

## 2022-03-04 PROCEDURE — 1123F ACP DISCUSS/DSCN MKR DOCD: CPT | Performed by: FAMILY MEDICINE

## 2022-03-04 PROCEDURE — 3023F SPIROM DOC REV: CPT | Performed by: FAMILY MEDICINE

## 2022-03-04 PROCEDURE — G8427 DOCREV CUR MEDS BY ELIG CLIN: HCPCS | Performed by: FAMILY MEDICINE

## 2022-03-04 PROCEDURE — G8399 PT W/DXA RESULTS DOCUMENT: HCPCS | Performed by: FAMILY MEDICINE

## 2022-03-04 PROCEDURE — 4004F PT TOBACCO SCREEN RCVD TLK: CPT | Performed by: FAMILY MEDICINE

## 2022-03-04 PROCEDURE — 4040F PNEUMOC VAC/ADMIN/RCVD: CPT | Performed by: FAMILY MEDICINE

## 2022-03-04 PROCEDURE — 99214 OFFICE O/P EST MOD 30 MIN: CPT | Performed by: FAMILY MEDICINE

## 2022-03-04 PROCEDURE — 1090F PRES/ABSN URINE INCON ASSESS: CPT | Performed by: FAMILY MEDICINE

## 2022-03-04 PROCEDURE — G8417 CALC BMI ABV UP PARAM F/U: HCPCS | Performed by: FAMILY MEDICINE

## 2022-03-04 PROCEDURE — G8484 FLU IMMUNIZE NO ADMIN: HCPCS | Performed by: FAMILY MEDICINE

## 2022-03-04 RX ORDER — DULOXETIN HYDROCHLORIDE 30 MG/1
CAPSULE, DELAYED RELEASE ORAL
Qty: 30 CAPSULE | Refills: 5 | Status: SHIPPED | OUTPATIENT
Start: 2022-03-04 | End: 2022-06-10 | Stop reason: SDUPTHER

## 2022-03-04 RX ORDER — APIXABAN 2.5 MG/1
TABLET, FILM COATED ORAL
COMMUNITY
Start: 2022-02-07

## 2022-03-04 RX ORDER — NITROGLYCERIN 0.4 MG/1
0.4 TABLET SUBLINGUAL EVERY 5 MIN PRN
Qty: 25 TABLET | Refills: 0 | Status: SHIPPED | OUTPATIENT
Start: 2022-03-04 | End: 2022-03-28

## 2022-03-04 NOTE — PROGRESS NOTES
3/4/2022    Claudette Meissner (:  1940) is a 80 y.o. female, here for evaluation of the following chief complaint(s):  5 month      ASSESSMENT/PLAN:    1. Type 2 diabetes mellitus with stage 3a chronic kidney disease, without long-term current use of insulin ([facility] [address]  She has been diet controlled. Monitor.    - Comprehensive Metabolic Panel; Future  - Hemoglobin A1C; Future    2. COPD, mild ([facility] [address]  Per last PFT in . She continues to be a heavy smoker. She did not get PFTs or spirometry done that were ordered in  and .   - CBC with Auto Differential; Future    3. Coronary artery disease involving native coronary artery of native heart without angina pectoris  She uses NTG once in a while and it is helpful. Encouraged smoking cessation/abstinence and nicotine abstinence. She is thinking about cutting back again. - nitroGLYCERIN (NITROSTAT) 0.4 MG SL tablet; Place 1 tablet under the tongue every 5 minutes as needed for Chest pain  Dispense: 25 tablet; Refill: 0    4. Essential hypertension  BP: 126/70   At goal and meeting medical guidelines. Continue treatment. 5. Coronary artery disease due to lipid rich plaque  Needs to stop smoking     6. Hypercholesteremia  Lab Results   Component Value Date    LDLCALC 93 10/07/2021    LDLDIRECT 97 2020     Not at goal but does not tolerate statins well. Using Livalo   - Comprehensive Metabolic Panel; Future  - Lipid, Fasting; Future    7. Hyperuricemia  Lab Results   Component Value Date    LABURIC 4.5 10/07/2021     Under control.   - Comprehensive Metabolic Panel; Future  - Uric Acid; Future    8. Vitamin D deficiency  - Vitamin D 25 Hydroxy; Future    9. Current use of proton pump inhibitor  - Vitamin B12; Future  - Magnesium; Future    10. Recurrent major depressive disorder, in partial remission  ([facility] [address]  Yesavage Geriatric depression scale given and her score is 5. Score> 5 is probable depression.   PHQ2 was 1 but she may have minimized Increase from 60 mg to 90 mg daily   - DULoxetine (CYMBALTA) 30 MG extended release capsule; Take 30 mg with a 60 mg capsule for a total of 90 mg per day. Dispense: 30 capsule; Refill: 5  - TSH with Reflex; Future    11. Obesity, Class III, BMI 40-49.9 (morbid obesity) ([facility] [address]  She is not likely to lose weight at her age. Encouraged healthy whole food diet. 12. Other acute pulmonary embolism without acute cor pulmonale ([facility] [address]  She is on DOAC. Not very active so will probably need to stay on long term. 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 FOLLOW UP:  Return in about 3 months (around 6/4/2022). For review of depression with higher dose of duloxetine. SUBJECTIVE/OBJECTIVE:    HPI    FU of diet controlled DM but has stage 3 CKD   Lab Results   Component Value Date    LABA1C 5.9 10/07/2021     Says she is eating healthy foods but also eats cookies and ice cream often. Most days. Depression:   Can you up the medication for depression? .  She finds herself sitting and just wants to cry. Her memory is getting worse. This upsets her. She has cut back her smoking but again increased back to 1 ppd. Was chronically smoking 2 ppd and got down to 10 cigs per day but back to 20 per day. See Assessment for other issues addressed.       Outpatient Medications Marked as Taking for the 3/4/22 encounter (Office Visit) with Criss Nye MD   Medication Sig Dispense Refill    fluticasone (FLONASE) 50 MCG/ACT nasal spray USE 1 SPRAY NASALLY DAILY 16 g 5    metoprolol tartrate (LOPRESSOR) 50 MG tablet TAKE 1 TABLET TWICE A  tablet 3    DULoxetine (CYMBALTA) 60 MG extended release capsule TAKE 1 CAPSULE DAILY FOR ARTHRITIS PAIN AND MOOD 90 capsule 3    Krill Oil 500 MG CAPS Take by mouth daily      pantoprazole (PROTONIX) 40 MG tablet TAKE 1 TABLET BY MOUTH EVERY MORNING BEFORE BREAKFAST      pitavastatin (LIVALO) 4 MG TABS tablet TAKE 1 TABLET NIGHTLY 90 tablet 3    allopurinol (ZYLOPRIM) 300 MG tablet Take 1 tablet by mouth daily To protect kidneys and prevent joint pain from gout 90 tablet 3    valsartan (DIOVAN) 320 MG tablet Take 1 tablet by mouth daily 90 tablet 3    ELIQUIS 5 MG TABS tablet 2.5 mg 2 times daily       Biotin 1 MG CAPS Take by mouth      acetaminophen (APAP EXTRA STRENGTH) 500 MG tablet Take 2 tablets by mouth 3 times daily as needed for Pain 100 tablet prn    Multiple Vitamins-Minerals (THERAPEUTIC MULTIVITAMIN-MINERALS) tablet Take 1 tablet by mouth daily      vitamin B-12 (CYANOCOBALAMIN) 1000 MCG tablet Take 2 tablets by mouth daily 30 tablet          Review of Systems        Vitals:    03/04/22 1438   BP: 126/70   Pulse: 64   SpO2: 96%   Weight: 225 lb 3.2 oz (102.2 kg)   Height: 5' (1.524 m)       Wt Readings from Last 3 Encounters:   03/04/22 225 lb 3.2 oz (102.2 kg)   10/20/21 221 lb (100.2 kg)   10/07/21 221 lb (100.2 kg)       BP Readings from Last 3 Encounters:   03/04/22 126/70   10/20/21 (!) 151/83   10/20/21 (!) 117/91       Physical Exam  Vitals reviewed. Constitutional:       General: She is not in acute distress. Appearance: Normal appearance. She is well-developed. She is obese. She is not diaphoretic. Comments: Pear shaped obesity   Eyes:      General: No scleral icterus. Neck:      Thyroid: No thyroid mass or thyromegaly. Vascular: No carotid bruit. Cardiovascular:      Rate and Rhythm: Normal rate and regular rhythm. Heart sounds: Normal heart sounds, S1 normal and S2 normal. No murmur heard. Pulmonary:      Effort: Pulmonary effort is normal. No respiratory distress. Breath sounds: Normal breath sounds. No decreased breath sounds, wheezing, rhonchi or rales. Abdominal:      General: Bowel sounds are normal. There is no abdominal bruit. Palpations: Abdomen is soft. There is no hepatomegaly or mass. Tenderness:  There is no abdominal tenderness. Musculoskeletal:      Cervical back: Neck supple. Right lower leg: No edema. Left lower leg: No edema. Lymphadenopathy:      Cervical: No cervical adenopathy. Skin:     General: Skin is warm and dry. Coloration: Skin is not pale. Nails: There is no clubbing. Comments: Pink skin tone. No pigment. No moles or skin cancers noted either (but never got out in the sun)    Neurological:      Mental Status: She is alert and oriented to person, place, and time. Motor: No tremor or abnormal muscle tone. Coordination: Coordination normal.      Gait: Gait normal.   Psychiatric:         Speech: Speech normal.         Behavior: Behavior normal.           An electronic signature was used to authenticate this note.     Va Allen MD

## 2022-03-06 ASSESSMENT — PATIENT HEALTH QUESTIONNAIRE - PHQ9
SUM OF ALL RESPONSES TO PHQ QUESTIONS 1-9: 1
1. LITTLE INTEREST OR PLEASURE IN DOING THINGS: 1
2. FEELING DOWN, DEPRESSED OR HOPELESS: 0
SUM OF ALL RESPONSES TO PHQ QUESTIONS 1-9: 1
SUM OF ALL RESPONSES TO PHQ QUESTIONS 1-9: 1
SUM OF ALL RESPONSES TO PHQ9 QUESTIONS 1 & 2: 1
SUM OF ALL RESPONSES TO PHQ QUESTIONS 1-9: 1

## 2022-03-26 DIAGNOSIS — I25.10 CORONARY ARTERY DISEASE INVOLVING NATIVE CORONARY ARTERY OF NATIVE HEART WITHOUT ANGINA PECTORIS: ICD-10-CM

## 2022-03-28 DIAGNOSIS — F33.41 RECURRENT MAJOR DEPRESSIVE DISORDER, IN PARTIAL REMISSION (HCC): ICD-10-CM

## 2022-03-28 DIAGNOSIS — E78.00 HYPERCHOLESTEREMIA: ICD-10-CM

## 2022-03-28 DIAGNOSIS — I25.10 CORONARY ARTERY DISEASE INVOLVING NATIVE CORONARY ARTERY OF NATIVE HEART WITHOUT ANGINA PECTORIS: ICD-10-CM

## 2022-03-28 DIAGNOSIS — E55.9 VITAMIN D DEFICIENCY: ICD-10-CM

## 2022-03-28 DIAGNOSIS — E79.0 HYPERURICEMIA: ICD-10-CM

## 2022-03-28 DIAGNOSIS — E11.22 TYPE 2 DIABETES MELLITUS WITH STAGE 3A CHRONIC KIDNEY DISEASE, WITHOUT LONG-TERM CURRENT USE OF INSULIN (HCC): ICD-10-CM

## 2022-03-28 DIAGNOSIS — J44.9 COPD, MILD (HCC): ICD-10-CM

## 2022-03-28 DIAGNOSIS — N18.31 TYPE 2 DIABETES MELLITUS WITH STAGE 3A CHRONIC KIDNEY DISEASE, WITHOUT LONG-TERM CURRENT USE OF INSULIN (HCC): ICD-10-CM

## 2022-03-28 DIAGNOSIS — Z79.899 CURRENT USE OF PROTON PUMP INHIBITOR: ICD-10-CM

## 2022-03-28 LAB
BASOPHILS ABSOLUTE: 0.1 K/UL (ref 0–0.2)
BASOPHILS RELATIVE PERCENT: 0.8 %
EOSINOPHILS ABSOLUTE: 0.3 K/UL (ref 0–0.6)
EOSINOPHILS RELATIVE PERCENT: 2.6 %
HCT VFR BLD CALC: 42.6 % (ref 36–48)
HEMOGLOBIN: 13.8 G/DL (ref 12–16)
LYMPHOCYTES ABSOLUTE: 3.8 K/UL (ref 1–5.1)
LYMPHOCYTES RELATIVE PERCENT: 29.3 %
MCH RBC QN AUTO: 29.3 PG (ref 26–34)
MCHC RBC AUTO-ENTMCNC: 32.3 G/DL (ref 31–36)
MCV RBC AUTO: 90.5 FL (ref 80–100)
MONOCYTES ABSOLUTE: 1 K/UL (ref 0–1.3)
MONOCYTES RELATIVE PERCENT: 7.9 %
NEUTROPHILS ABSOLUTE: 7.6 K/UL (ref 1.7–7.7)
NEUTROPHILS RELATIVE PERCENT: 59.4 %
PDW BLD-RTO: 17 % (ref 12.4–15.4)
PLATELET # BLD: 297 K/UL (ref 135–450)
PMV BLD AUTO: 8.8 FL (ref 5–10.5)
RBC # BLD: 4.71 M/UL (ref 4–5.2)
TSH REFLEX: 5.07 UIU/ML (ref 0.27–4.2)
WBC # BLD: 12.8 K/UL (ref 4–11)

## 2022-03-28 RX ORDER — NITROGLYCERIN 0.4 MG/1
TABLET SUBLINGUAL
Qty: 25 TABLET | Refills: 0 | Status: SHIPPED | OUTPATIENT
Start: 2022-03-28 | End: 2022-04-04

## 2022-03-28 RX ORDER — PITAVASTATIN CALCIUM 4.18 MG/1
TABLET, FILM COATED ORAL
Qty: 90 TABLET | Refills: 3 | Status: SHIPPED | OUTPATIENT
Start: 2022-03-28 | End: 2022-06-10 | Stop reason: SDUPTHER

## 2022-03-29 LAB
A/G RATIO: 2 (ref 1.1–2.2)
ALBUMIN SERPL-MCNC: 4.9 G/DL (ref 3.4–5)
ALP BLD-CCNC: 147 U/L (ref 40–129)
ALT SERPL-CCNC: 14 U/L (ref 10–40)
ANION GAP SERPL CALCULATED.3IONS-SCNC: 13 MMOL/L (ref 3–16)
AST SERPL-CCNC: 18 U/L (ref 15–37)
BILIRUB SERPL-MCNC: 0.4 MG/DL (ref 0–1)
BUN BLDV-MCNC: 25 MG/DL (ref 7–20)
CALCIUM SERPL-MCNC: 10.8 MG/DL (ref 8.3–10.6)
CHLORIDE BLD-SCNC: 107 MMOL/L (ref 99–110)
CHOLESTEROL, FASTING: 183 MG/DL (ref 0–199)
CO2: 20 MMOL/L (ref 21–32)
CREAT SERPL-MCNC: 1.2 MG/DL (ref 0.6–1.2)
ESTIMATED AVERAGE GLUCOSE: 125.5 MG/DL
GFR AFRICAN AMERICAN: 52
GFR NON-AFRICAN AMERICAN: 43
GLUCOSE BLD-MCNC: 103 MG/DL (ref 70–99)
HBA1C MFR BLD: 6 %
HDLC SERPL-MCNC: 53 MG/DL (ref 40–60)
LDL CHOLESTEROL CALCULATED: 100 MG/DL
MAGNESIUM: 2.5 MG/DL (ref 1.8–2.4)
POTASSIUM SERPL-SCNC: 4.7 MMOL/L (ref 3.5–5.1)
SODIUM BLD-SCNC: 140 MMOL/L (ref 136–145)
T4 FREE: 1 NG/DL (ref 0.9–1.8)
TOTAL PROTEIN: 7.3 G/DL (ref 6.4–8.2)
TRIGLYCERIDE, FASTING: 148 MG/DL (ref 0–150)
URIC ACID, SERUM: 6.7 MG/DL (ref 2.6–6)
VITAMIN B-12: 1946 PG/ML (ref 211–911)
VITAMIN D 25-HYDROXY: 49.4 NG/ML
VLDLC SERPL CALC-MCNC: 30 MG/DL

## 2022-03-29 NOTE — RESULT ENCOUNTER NOTE
Call patient:    Blood tests are OK but not at goal.  The LDL = BAD Cholesterol is still a bit too high, uric acid is too high and kidney function is declining some. If you can lose 12-20 lbs or weight by avoiding fried foods and cutting down on sweets and meats, this can improve all of these issues.

## 2022-03-30 ENCOUNTER — TELEPHONE (OUTPATIENT)
Dept: ADMINISTRATIVE | Age: 82
End: 2022-03-30

## 2022-03-30 NOTE — TELEPHONE ENCOUNTER
Submitted PA for Livalo 4MG tablets  Via CMM Key: [id number] STATUS: APPROVED effective 02/28/2022-03/30/2023    Please notify patient.  Thank you

## 2022-04-02 DIAGNOSIS — I25.10 CORONARY ARTERY DISEASE INVOLVING NATIVE CORONARY ARTERY OF NATIVE HEART WITHOUT ANGINA PECTORIS: ICD-10-CM

## 2022-04-04 RX ORDER — NITROGLYCERIN 0.4 MG/1
TABLET SUBLINGUAL
Qty: 25 TABLET | Refills: 0 | Status: SHIPPED | OUTPATIENT
Start: 2022-04-04 | End: 2022-04-11

## 2022-04-11 DIAGNOSIS — I25.10 CORONARY ARTERY DISEASE INVOLVING NATIVE CORONARY ARTERY OF NATIVE HEART WITHOUT ANGINA PECTORIS: ICD-10-CM

## 2022-04-11 RX ORDER — NITROGLYCERIN 0.4 MG/1
TABLET SUBLINGUAL
Qty: 25 TABLET | Refills: 0 | Status: SHIPPED | OUTPATIENT
Start: 2022-04-11

## 2022-05-03 ENCOUNTER — HOSPITAL ENCOUNTER (OUTPATIENT)
Dept: CT IMAGING | Age: 82
Discharge: HOME | End: 2022-05-03
Payer: MEDICARE

## 2022-05-03 DIAGNOSIS — I26.99 PULMONARY EMBOLISM, UNSPECIFIED CHRONICITY, UNSPECIFIED PULMONARY EMBOLISM TYPE, UNSPECIFIED WHETHER ACUTE COR PULMONALE PRESENT (HCC): ICD-10-CM

## 2022-05-03 PROCEDURE — 71260 CT THORAX DX C+: CPT

## 2022-05-03 PROCEDURE — 6360000004 HC RX CONTRAST MEDICATION: Performed by: FAMILY MEDICINE

## 2022-05-03 RX ADMIN — IOPAMIDOL 75 ML: 755 INJECTION, SOLUTION INTRAVENOUS at 13:51

## 2022-05-26 ENCOUNTER — TELEPHONE (OUTPATIENT)
Dept: ADMINISTRATIVE | Age: 82
End: 2022-05-26

## 2022-05-26 NOTE — TELEPHONE ENCOUNTER
Submitted PA for Valsartan 320MG tablets. Key: [id number]. Via CMM STATUS: Drug is covered by current benefit plan. No further PA activity needed. Will scan letter when received. If this requires a response please respond to the pool ( [facility] [address]). Thank you please advise patient.

## 2022-06-10 ENCOUNTER — TELEPHONE (OUTPATIENT)
Dept: INTERNAL MEDICINE CLINIC | Age: 82
End: 2022-06-10

## 2022-06-10 ENCOUNTER — OFFICE VISIT (OUTPATIENT)
Dept: INTERNAL MEDICINE CLINIC | Age: 82
End: 2022-06-10
Payer: MEDICARE

## 2022-06-10 VITALS
SYSTOLIC BLOOD PRESSURE: 122 MMHG | BODY MASS INDEX: 43.98 KG/M2 | OXYGEN SATURATION: 98 % | HEART RATE: 70 BPM | HEIGHT: 60 IN | DIASTOLIC BLOOD PRESSURE: 60 MMHG | WEIGHT: 224 LBS

## 2022-06-10 DIAGNOSIS — I65.23 ATHEROSCLEROSIS OF BOTH CAROTID ARTERIES: ICD-10-CM

## 2022-06-10 DIAGNOSIS — E78.00 HYPERCHOLESTEREMIA: ICD-10-CM

## 2022-06-10 DIAGNOSIS — E79.0 HYPERURICEMIA: ICD-10-CM

## 2022-06-10 DIAGNOSIS — L30.8 OTHER ECZEMA: ICD-10-CM

## 2022-06-10 DIAGNOSIS — N18.32 TYPE 2 DIABETES MELLITUS WITH STAGE 3B CHRONIC KIDNEY DISEASE, WITHOUT LONG-TERM CURRENT USE OF INSULIN (HCC): Primary | ICD-10-CM

## 2022-06-10 DIAGNOSIS — F33.41 RECURRENT MAJOR DEPRESSIVE DISORDER, IN PARTIAL REMISSION (HCC): ICD-10-CM

## 2022-06-10 DIAGNOSIS — E11.22 TYPE 2 DIABETES MELLITUS WITH STAGE 3B CHRONIC KIDNEY DISEASE, WITHOUT LONG-TERM CURRENT USE OF INSULIN (HCC): Primary | ICD-10-CM

## 2022-06-10 DIAGNOSIS — F17.200 TOBACCO DEPENDENCE: ICD-10-CM

## 2022-06-10 DIAGNOSIS — I10 ESSENTIAL HYPERTENSION: ICD-10-CM

## 2022-06-10 DIAGNOSIS — E53.8 B12 DEFICIENCY: ICD-10-CM

## 2022-06-10 DIAGNOSIS — D50.9 IRON DEFICIENCY ANEMIA, UNSPECIFIED IRON DEFICIENCY ANEMIA TYPE: ICD-10-CM

## 2022-06-10 DIAGNOSIS — I25.10 CORONARY ARTERY DISEASE INVOLVING NATIVE CORONARY ARTERY OF NATIVE HEART WITHOUT ANGINA PECTORIS: ICD-10-CM

## 2022-06-10 DIAGNOSIS — N18.32 STAGE 3B CHRONIC KIDNEY DISEASE (HCC): ICD-10-CM

## 2022-06-10 DIAGNOSIS — L57.0 ACTINIC KERATOSES: ICD-10-CM

## 2022-06-10 PROCEDURE — G8427 DOCREV CUR MEDS BY ELIG CLIN: HCPCS | Performed by: FAMILY MEDICINE

## 2022-06-10 PROCEDURE — G8399 PT W/DXA RESULTS DOCUMENT: HCPCS | Performed by: FAMILY MEDICINE

## 2022-06-10 PROCEDURE — 99214 OFFICE O/P EST MOD 30 MIN: CPT | Performed by: FAMILY MEDICINE

## 2022-06-10 PROCEDURE — G8417 CALC BMI ABV UP PARAM F/U: HCPCS | Performed by: FAMILY MEDICINE

## 2022-06-10 PROCEDURE — 3044F HG A1C LEVEL LT 7.0%: CPT | Performed by: FAMILY MEDICINE

## 2022-06-10 PROCEDURE — 1123F ACP DISCUSS/DSCN MKR DOCD: CPT | Performed by: FAMILY MEDICINE

## 2022-06-10 PROCEDURE — 1090F PRES/ABSN URINE INCON ASSESS: CPT | Performed by: FAMILY MEDICINE

## 2022-06-10 PROCEDURE — 4004F PT TOBACCO SCREEN RCVD TLK: CPT | Performed by: FAMILY MEDICINE

## 2022-06-10 RX ORDER — PITAVASTATIN CALCIUM 4.18 MG/1
TABLET, FILM COATED ORAL
Qty: 90 TABLET | Refills: 3 | Status: SHIPPED | OUTPATIENT
Start: 2022-06-10

## 2022-06-10 RX ORDER — METOPROLOL TARTRATE 50 MG/1
TABLET, FILM COATED ORAL
Qty: 180 TABLET | Refills: 3 | Status: SHIPPED | OUTPATIENT
Start: 2022-06-10

## 2022-06-10 RX ORDER — DULOXETIN HYDROCHLORIDE 30 MG/1
CAPSULE, DELAYED RELEASE ORAL
Qty: 90 CAPSULE | Refills: 1 | Status: SHIPPED | OUTPATIENT
Start: 2022-06-10

## 2022-06-10 RX ORDER — ALLOPURINOL 300 MG/1
300 TABLET ORAL DAILY
Qty: 90 TABLET | Refills: 3 | Status: SHIPPED | OUTPATIENT
Start: 2022-06-10

## 2022-06-10 RX ORDER — DULOXETIN HYDROCHLORIDE 60 MG/1
CAPSULE, DELAYED RELEASE ORAL
Qty: 90 CAPSULE | Refills: 1 | Status: SHIPPED | OUTPATIENT
Start: 2022-06-10

## 2022-06-10 RX ORDER — TRIAMCINOLONE ACETONIDE 5 MG/G
CREAM TOPICAL
Qty: 30 G | Refills: 3 | Status: SHIPPED | OUTPATIENT
Start: 2022-06-10 | End: 2022-06-17

## 2022-06-10 NOTE — PROGRESS NOTES
6/10/2022    Adalid Koehler (:  1940) is a 80 y.o. female, here for evaluation of the following chief complaint(s):  3 Month Follow-Up and Rash (on her back x 3 weeks)        ASSESSMENT/PLAN:    1. Type 2 diabetes mellitus with stage 3b chronic kidney disease, without long-term current use of insulin ([address]  Lab Results   Component Value Date    LABA1C 6.0 2022     Under control with diet only     2. Actinic keratoses  Typical appearance on dorsal hands. Referred to dermatologist for treatment. - Amb External Referral To Dermatology    3. Coronary artery disease involving native coronary artery of native heart without angina pectoris  No recent chest pain. - LIVALO 4 MG TABS tablet; TAKE 1 TABLET NIGHTLY  Dispense: 90 tablet; Refill: 3    4. Hypercholesteremia  Lab Results   Component Value Date    LDLCALC 100 (H) 2022    LDLDIRECT 97 2020     Not at goal but on the only statin tolerated. Encouraged compliance with 4 mg daily     5. Essential hypertension  BP: 122/60   BP is at goal     6. Stage 3b chronic kidney disease (HCC)  Est GFR 43. Will need to monitor     7. Hyperuricemia  Lab Results   Component Value Date    LABURIC 6.7 (H) 2022     Encouraged to cut down on sugar and red meat   - allopurinol (ZYLOPRIM) 300 MG tablet; Take 1 tablet by mouth daily To protect kidneys and prevent joint pain from gout  Dispense: 90 tablet; Refill: 3    8. Atherosclerosis of both carotid arteries  Same as CAD. Denies stroke like symptoms. 9. Iron deficiency anemia, unspecified iron deficiency anemia type  Lab Results   Component Value Date    HGB 13.8 2022     Resolved. 10. Tobacco dependence  Smoking cessation strategies discussed. 11. Other eczema  Cut out tags in clothing or buy without tags. - triamcinolone (ARISTOCORT) 0.5 % cream; Apply topically 2 times daily. Dispense: 30 g; Refill: 3    12.  Recurrent major depressive disorder, in partial remission ([facility] [address]  Stable at 90 mg. Also using this for arthritis pain   - DULoxetine (CYMBALTA) 30 MG extended release capsule; Take 30 mg with a 60 mg capsule for a total of 90 mg per day. Dispense: 90 capsule; Refill: 1        FOLLOW UP:  Return in about 3 months (around 9/10/2022) for Medicare AWCleburned blood tests. HPI  Follow up of multiple chronic problems. Stable. Still smoking 18-19 cigs per day. Knows she should quit but no recent changes. H/o 2 ppd     Her skin is itching on the back of lower neck upper torso. When I check the area, her skin is red only in the area of her clothing tag. She admits she used to cut them all out but has not lately. She requests refill on steroid cream because she is out. HLP/CAD. Her insurance did approve the Livalo. It was PA. She would like me to check some spots on her hands. Outpatient Medications Marked as Taking for the 6/10/22 encounter (Office Visit) with Leonie Rivera MD   Medication Sig Dispense Refill    triamcinolone (ARISTOCORT) 0.5 % cream Apply topically 2 times daily. 30 g 3    LIVALO 4 MG TABS tablet TAKE 1 TABLET NIGHTLY 90 tablet 3    metoprolol tartrate (LOPRESSOR) 50 MG tablet TAKE 1 TABLET TWICE A  tablet 3    DULoxetine (CYMBALTA) 60 MG extended release capsule TAKE 1 CAPSULE DAILY WITH A 30 MG CAPSULE FOR ARTHRITIS PAIN AND MOOD 90 capsule 1    DULoxetine (CYMBALTA) 30 MG extended release capsule Take 30 mg with a 60 mg capsule for a total of 90 mg per day.  90 capsule 1    allopurinol (ZYLOPRIM) 300 MG tablet Take 1 tablet by mouth daily To protect kidneys and prevent joint pain from gout 90 tablet 3    nitroGLYCERIN (NITROSTAT) 0.4 MG SL tablet DISSOLVE ONE TABLET UNDER TONGUE AS NEEDED FOR CHEST PAIN EVERY 5 MINUTES 25 tablet 0    ELIQUIS 2.5 MG TABS tablet TAKE 1 TABLET BY MOUTH TWICE DAILY      fluticasone (FLONASE) 50 MCG/ACT nasal spray USE 1 SPRAY NASALLY DAILY 16 g 5    Krill Oil 500 MG CAPS Take by mouth daily  pantoprazole (PROTONIX) 40 MG tablet TAKE 1 TABLET BY MOUTH EVERY MORNING BEFORE BREAKFAST      meclizine (ANTIVERT) 12.5 MG tablet Take 1-2 tablets by mouth daily as needed for Dizziness 30 tablet 2    valsartan (DIOVAN) 320 MG tablet Take 1 tablet by mouth daily 90 tablet 3    Biotin 1 MG CAPS Take by mouth      acetaminophen (APAP EXTRA STRENGTH) 500 MG tablet Take 2 tablets by mouth 3 times daily as needed for Pain 100 tablet prn    albuterol sulfate HFA (PROAIR HFA) 108 (90 Base) MCG/ACT inhaler Inhale 2 puffs into the lungs every 4 hours as needed for Wheezing or Shortness of Breath (cough, shortness breath or wheezing.) 1 Inhaler 2    Multiple Vitamins-Minerals (THERAPEUTIC MULTIVITAMIN-MINERALS) tablet Take 1 tablet by mouth daily      vitamin B-12 (CYANOCOBALAMIN) 1000 MCG tablet Take 2 tablets by mouth daily 30 tablet     aspirin 81 MG tablet Take 81 mg by mouth daily         Review of Systems    OBJECTIVE:    Vitals:    06/10/22 1412   BP: 122/60   Site: Left Upper Arm   Position: Sitting   Cuff Size: Large Adult   Pulse: 70   SpO2: 98%   Weight: 224 lb (101.6 kg)   Height: 5' (1.524 m)       Physical Exam  Vitals reviewed. Constitutional:       General: She is not in acute distress. Appearance: Normal appearance. She is well-developed. She is obese. She is not diaphoretic. Comments: Pear shaped obesity   Eyes:      General: No scleral icterus. Neck:      Thyroid: No thyroid mass or thyromegaly. Vascular: No carotid bruit. Cardiovascular:      Rate and Rhythm: Normal rate and regular rhythm. Heart sounds: Normal heart sounds, S1 normal and S2 normal. No murmur heard. Pulmonary:      Effort: Pulmonary effort is normal. No respiratory distress. Breath sounds: Normal breath sounds. No decreased breath sounds, wheezing, rhonchi or rales. Abdominal:      General: There is no abdominal bruit. Palpations: There is no hepatomegaly.    Musculoskeletal: Cervical back: Neck supple. Right lower leg: No edema. Left lower leg: No edema. Lymphadenopathy:      Cervical: No cervical adenopathy. Skin:     General: Skin is warm and dry. Coloration: Skin is not pale. Findings: Rash present. Rash is macular. Nails: There is no clubbing. Comments: Pink skin tone. No pigment. Pink macule post midline neck where clothing tag sits. Both hands with thick scale with some surrounding redness c/w AKs and not eczema. Neurological:      Mental Status: She is alert and oriented to person, place, and time. Motor: No tremor or abnormal muscle tone. Coordination: Coordination normal.      Gait: Gait normal.   Psychiatric:         Speech: Speech normal.         Behavior: Behavior normal.           An electronic signature was used to authenticate this note.     Nai Santana MD

## 2022-06-10 NOTE — TELEPHONE ENCOUNTER
Angel Putnam with Dr Tiffany Arnett at [facility] states they received referral from Dr Algie Meigs. She states the information they received shows patient previously had melanoma and they would like the report faxed to them. Angel Putnam states they also need patient insurance information. Fax #[phone].

## 2022-06-10 NOTE — PATIENT INSTRUCTIONS
TRY TO CUT BACK ON THE SMOKING BY 17 CIGS EACH DAY FOR A WEEK, THEN 16 CIGS EACH DAY FOR A WEEK AND THEN 13 CIGS EACH DAY FOR A WEEK AND JUST KEEP CUTTING BACK.     Try to plan some other activity to do with your hands instead of smoking

## 2022-06-13 RX ORDER — PANTOPRAZOLE SODIUM 40 MG/1
TABLET, DELAYED RELEASE ORAL
Qty: 90 TABLET | Refills: 2 | Status: SHIPPED | OUTPATIENT
Start: 2022-06-13

## 2022-07-05 DIAGNOSIS — I10 ESSENTIAL HYPERTENSION: ICD-10-CM

## 2022-07-05 RX ORDER — VALSARTAN 320 MG/1
320 TABLET ORAL DAILY
Qty: 90 TABLET | Refills: 3 | Status: SHIPPED | OUTPATIENT
Start: 2022-07-05

## 2022-07-28 ENCOUNTER — OFFICE VISIT (OUTPATIENT)
Dept: CARDIOLOGY CLINIC | Age: 82
End: 2022-07-28
Payer: MEDICARE

## 2022-07-28 VITALS
WEIGHT: 219 LBS | SYSTOLIC BLOOD PRESSURE: 132 MMHG | OXYGEN SATURATION: 94 % | BODY MASS INDEX: 43 KG/M2 | HEIGHT: 60 IN | HEART RATE: 69 BPM | DIASTOLIC BLOOD PRESSURE: 70 MMHG

## 2022-07-28 DIAGNOSIS — I25.10 CORONARY ARTERY DISEASE INVOLVING NATIVE CORONARY ARTERY OF NATIVE HEART WITHOUT ANGINA PECTORIS: Primary | ICD-10-CM

## 2022-07-28 DIAGNOSIS — I10 PRIMARY HYPERTENSION: ICD-10-CM

## 2022-07-28 DIAGNOSIS — E78.2 MIXED HYPERLIPIDEMIA: ICD-10-CM

## 2022-07-28 PROCEDURE — 4004F PT TOBACCO SCREEN RCVD TLK: CPT | Performed by: NURSE PRACTITIONER

## 2022-07-28 PROCEDURE — 1090F PRES/ABSN URINE INCON ASSESS: CPT | Performed by: NURSE PRACTITIONER

## 2022-07-28 PROCEDURE — 1123F ACP DISCUSS/DSCN MKR DOCD: CPT | Performed by: NURSE PRACTITIONER

## 2022-07-28 PROCEDURE — G8399 PT W/DXA RESULTS DOCUMENT: HCPCS | Performed by: NURSE PRACTITIONER

## 2022-07-28 PROCEDURE — G8427 DOCREV CUR MEDS BY ELIG CLIN: HCPCS | Performed by: NURSE PRACTITIONER

## 2022-07-28 PROCEDURE — 93000 ELECTROCARDIOGRAM COMPLETE: CPT | Performed by: NURSE PRACTITIONER

## 2022-07-28 PROCEDURE — G8417 CALC BMI ABV UP PARAM F/U: HCPCS | Performed by: NURSE PRACTITIONER

## 2022-07-28 PROCEDURE — 99214 OFFICE O/P EST MOD 30 MIN: CPT | Performed by: NURSE PRACTITIONER

## 2022-07-28 NOTE — PROGRESS NOTES
[facility]     Outpatient Follow Up Note    Nino Lima is 80 y.o. female who presents today with a history of CAD s/p CABG March '15, HTN and hyperlipidemia. Her other hx includes : pul emboli Feb '21     CHIEF COMPLAINT / HPI:  Follow Up secondary to coronary artery disease    Subjective:   She denies significant chest pain. Her cor disease was discovered by esophageal burning with upward radiation to jaw/throat. She denies recurrence. There is no SOB/BOSE. The patient denies orthopnea/PND. The patient does have swelling in her Rt leg which is from her SVG site. The patients weight is stable . The patient is not experiencing palpitations . She has dizziness all the time from her ear problems. These symptoms show no change since the last OV. With regard to medication therapy the patient has been compliant with prescribed regimen. They have tolerated therapy to date. Past Medical History:   Diagnosis Date    [facility])     CAD (coronary artery disease)     COPD (chronic obstructive pulmonary disease) ([facility] [address]     Depression 11/27/2017    Diabetes mellitus ([facility] [address]     Hearing impaired     Hiatal hernia     Hyperlipidemia     true allergy to statins    Hypertension     Kidney stone 02/03/2013    Malignant melanoma (HCC)     Osteoarthritis     Otosclerosis     Dr. Ashleigh Lopez    Pneumonia     Pulmonary embolism without acute cor pulmonale ([facility] [address] 2/9/2021    Unprovoked but neg thrombophilia work up. Stented coronary artery      Social History:    Social History     Tobacco Use   Smoking Status Every Day    Packs/day: 1.00    Years: 60.00    Pack years: 60.00    Types: Cigarettes   Smokeless Tobacco Never   Tobacco Comments    h/o 2 PPD;  lowest 1/2 ppd for a few months only. 2022:  1 PPD     Current Medications:  Current Outpatient Medications   Medication Sig Dispense Refill    valsartan (DIOVAN) 320 MG tablet TAKE 1 TABLET BY MOUTH DAILY.  90 tablet 3    pantoprazole (PROTONIX) 40 MG tablet TAKE 1 TABLET BY MOUTH EVERY MORNING BEFORE BREAKFAST 90 tablet 2    LIVALO 4 MG TABS tablet TAKE 1 TABLET NIGHTLY 90 tablet 3    metoprolol tartrate (LOPRESSOR) 50 MG tablet TAKE 1 TABLET TWICE A  tablet 3    DULoxetine (CYMBALTA) 60 MG extended release capsule TAKE 1 CAPSULE DAILY WITH A 30 MG CAPSULE FOR ARTHRITIS PAIN AND MOOD 90 capsule 1    DULoxetine (CYMBALTA) 30 MG extended release capsule Take 30 mg with a 60 mg capsule for a total of 90 mg per day. 90 capsule 1    allopurinol (ZYLOPRIM) 300 MG tablet Take 1 tablet by mouth daily To protect kidneys and prevent joint pain from gout 90 tablet 3    ELIQUIS 2.5 MG TABS tablet TAKE 1 TABLET BY MOUTH TWICE DAILY      fluticasone (FLONASE) 50 MCG/ACT nasal spray USE 1 SPRAY NASALLY DAILY 16 g 5    meclizine (ANTIVERT) 12.5 MG tablet Take 1-2 tablets by mouth daily as needed for Dizziness 30 tablet 2    Biotin 1 MG CAPS Take by mouth      acetaminophen (APAP EXTRA STRENGTH) 500 MG tablet Take 2 tablets by mouth 3 times daily as needed for Pain 100 tablet prn    albuterol sulfate HFA (PROAIR HFA) 108 (90 Base) MCG/ACT inhaler Inhale 2 puffs into the lungs every 4 hours as needed for Wheezing or Shortness of Breath (cough, shortness breath or wheezing.) 1 Inhaler 2    Multiple Vitamins-Minerals (THERAPEUTIC MULTIVITAMIN-MINERALS) tablet Take 1 tablet by mouth daily      vitamin B-12 (CYANOCOBALAMIN) 1000 MCG tablet Take 2 tablets by mouth daily 30 tablet     aspirin 81 MG tablet Take 81 mg by mouth daily      nitroGLYCERIN (NITROSTAT) 0.4 MG SL tablet DISSOLVE ONE TABLET UNDER TONGUE AS NEEDED FOR CHEST PAIN EVERY 5 MINUTES (Patient not taking: Reported on 7/28/2022) 25 tablet 0     No current facility-administered medications for this visit. REVIEW OF SYSTEMS:    CONSTITUTIONAL: No major weight gain or loss, fatigue, weakness, night sweats or fever. HEENT: No new vision difficulties or ringing in the ears.   RESPIRATORY: No new SOB, PND, orthopnea or cough.   CARDIOVASCULAR: See HPI  GI: No nausea, vomiting, diarrhea, constipation, abdominal pain or changes in bowel habits. : No urinary frequency, urgency, incontinence hematuria or dysuria. SKIN: No cyanosis or skin lesions. MUSCULOSKELETAL: No new muscle or joint pain. NEUROLOGICAL: No syncope or TIA-like symptoms. PSYCHIATRIC: No anxiety, pain, insomnia or depression    Objective:   PHYSICAL EXAM:        VITALS:  /70 (Site: Right Lower Arm, Cuff Size: Medium Adult)   Pulse 69   Ht 5' (1.524 m)   Wt 219 lb (99.3 kg)   SpO2 94%   BMI 42.77 kg/m²   CONSTITUTIONAL: Cooperative, no apparent distress, and appears well nourished / over weight  NEUROLOGIC:  Awake and orientated to person, place and time. PSYCH: Calm affect. SKIN: Warm and dry. HEENT: Sclera non-icteric, normocephalic, neck supple, no elevation of JVP, normal carotid pulses with no bruits and thyroid normal size. LUNGS:  No increased work of breathing and clear to auscultation, no crackles or wheezing  CARDIOVASCULAR:  Regular rate and rhythm with no murmurs, gallops, rubs, or abnormal heart sounds, normal PMI. The apical impulses not displaced  JVP less than 8 cm H2O  Heart tones are crisp and normal  Cervical veins are not engorged  The carotid upstroke is normal in amplitude and contour without delay or bruit  JVP is not elevated  ABDOMEN:  Normal bowel sounds, non-distended and non-tender to palpation  EXT: No edema, no calf tenderness. Pulses are present bilaterally.     DATA:    Lab Results   Component Value Date    ALT 14 03/28/2022    AST 18 03/28/2022    ALKPHOS 147 (H) 03/28/2022    BILITOT 0.4 03/28/2022     Lab Results   Component Value Date    CREATININE 1.2 03/28/2022    BUN 25 (H) 03/28/2022     03/28/2022    K 4.7 03/28/2022     03/28/2022    CO2 20 (L) 03/28/2022     No results found for: TSH, [id number], [id number], THYROIDAB  Lab Results   Component Value Date    WBC 12.8 (H) 03/28/2022    HGB 13.8 daily             I had the opportunity to review the clinical symptoms and presentation of Zelalem Otto. Plan:     EKG : sinus rhythm 68  Echocardiogram : last test remote  F/U in 1 yr     Overall the patient is stable from CV standpoint    I have addresed the patient's cardiac risk factors and adjusted pharmacologic treatment as needed. In addition, I have reinforced the need for patient directed risk factor modification. Further evaluation will be based upon the patient's clinical course and testing results. All questions and concerns were addressed to the patient/daughter. Alternatives to my treatment were discussed. The patient is currently smoking: ~ 1 ppd : quit x30 yrs then resumed in '03 after her  . The risks related to smoking were reviewed with the patient. Recommend maintaining a smoke-free lifestyle. Products available for smoking cessation were discussed     Patient is on a beta-blocker  Patient is not on an ace-i/ARB : neg CHF  Patient is on a statin    Antiplatelet therapy / anti-coagulation has been recommended / prescribed for this patient. Education conducted on adverse reactions including bleeding was discussed. ~DOAC for hx pul embolil      The patient verbalizes understanding not to stop medications without discussing with us. Discussed exercise: 30-60 minutes 7 days/week  Discussed Low saturated fat/DOMINIQUE diet. Thank you for allowing to us to participate in the care of Zelalem Otto.     ELIZABETH King    Documentation of today's visit sent to PCP

## 2022-08-09 ENCOUNTER — HOSPITAL ENCOUNTER (OUTPATIENT)
Dept: NON INVASIVE DIAGNOSTICS | Age: 82
Discharge: HOME | End: 2022-08-09
Payer: MEDICARE

## 2022-08-09 DIAGNOSIS — I25.10 CORONARY ARTERY DISEASE INVOLVING NATIVE CORONARY ARTERY OF NATIVE HEART WITHOUT ANGINA PECTORIS: ICD-10-CM

## 2022-08-09 LAB
LV EF: 65 %
LVEF MODALITY: NORMAL

## 2022-08-09 PROCEDURE — C8929 TTE W OR WO FOL WCON,DOPPLER: HCPCS

## 2022-08-10 ENCOUNTER — TELEPHONE (OUTPATIENT)
Dept: CARDIOLOGY CLINIC | Age: 82
End: 2022-08-10

## 2022-08-10 NOTE — TELEPHONE ENCOUNTER
Follow your Anticoagulation Dosing Card. Be consistent with your diet and vitamin K foods. Contact office with any medication changes including supplements or over the counter medicines. Monitor for any signs of bleeding or unusual bruising- call office or seek medical attention if they occur. Monitor for any signs of a clot such as sudden shortness of breath, chest pain, or redness, warmth, swelling of arms or legs- seek medical attention if they occur. Call office with any questions.        I spoke with pt and relayed echo results per NPTS. Pt verbalized understanding.

## 2022-09-23 ENCOUNTER — OFFICE VISIT (OUTPATIENT)
Dept: INTERNAL MEDICINE CLINIC | Age: 82
End: 2022-09-23
Payer: MEDICARE

## 2022-09-23 VITALS
WEIGHT: 219 LBS | SYSTOLIC BLOOD PRESSURE: 112 MMHG | BODY MASS INDEX: 43 KG/M2 | OXYGEN SATURATION: 98 % | HEIGHT: 60 IN | DIASTOLIC BLOOD PRESSURE: 72 MMHG | HEART RATE: 62 BPM

## 2022-09-23 DIAGNOSIS — N18.32 TYPE 2 DIABETES MELLITUS WITH STAGE 3B CHRONIC KIDNEY DISEASE, WITHOUT LONG-TERM CURRENT USE OF INSULIN (HCC): ICD-10-CM

## 2022-09-23 DIAGNOSIS — E79.0 HYPERURICEMIA: ICD-10-CM

## 2022-09-23 DIAGNOSIS — I65.23 ATHEROSCLEROSIS OF BOTH CAROTID ARTERIES: Primary | ICD-10-CM

## 2022-09-23 DIAGNOSIS — M17.0 PRIMARY OSTEOARTHRITIS OF BOTH KNEES: ICD-10-CM

## 2022-09-23 DIAGNOSIS — Z79.899 CURRENT USE OF PROTON PUMP INHIBITOR: ICD-10-CM

## 2022-09-23 DIAGNOSIS — N18.32 STAGE 3B CHRONIC KIDNEY DISEASE (HCC): ICD-10-CM

## 2022-09-23 DIAGNOSIS — J44.9 CHRONIC OBSTRUCTIVE PULMONARY DISEASE, UNSPECIFIED COPD TYPE (HCC): ICD-10-CM

## 2022-09-23 DIAGNOSIS — I25.10 CORONARY ARTERY DISEASE DUE TO LIPID RICH PLAQUE: ICD-10-CM

## 2022-09-23 DIAGNOSIS — I25.83 CORONARY ARTERY DISEASE DUE TO LIPID RICH PLAQUE: ICD-10-CM

## 2022-09-23 DIAGNOSIS — E11.22 TYPE 2 DIABETES MELLITUS WITH STAGE 3B CHRONIC KIDNEY DISEASE, WITHOUT LONG-TERM CURRENT USE OF INSULIN (HCC): ICD-10-CM

## 2022-09-23 DIAGNOSIS — I10 ESSENTIAL HYPERTENSION: ICD-10-CM

## 2022-09-23 LAB
A/G RATIO: 2 (ref 1.1–2.2)
ALBUMIN SERPL-MCNC: 4.6 G/DL (ref 3.4–5)
ALP BLD-CCNC: 141 U/L (ref 40–129)
ALT SERPL-CCNC: 15 U/L (ref 10–40)
ANION GAP SERPL CALCULATED.3IONS-SCNC: 17 MMOL/L (ref 3–16)
AST SERPL-CCNC: 19 U/L (ref 15–37)
BILIRUB SERPL-MCNC: 0.3 MG/DL (ref 0–1)
BUN BLDV-MCNC: 17 MG/DL (ref 7–20)
CALCIUM SERPL-MCNC: 10.4 MG/DL (ref 8.3–10.6)
CHLORIDE BLD-SCNC: 102 MMOL/L (ref 99–110)
CO2: 24 MMOL/L (ref 21–32)
CREAT SERPL-MCNC: 0.9 MG/DL (ref 0.6–1.2)
GFR AFRICAN AMERICAN: >60
GFR NON-AFRICAN AMERICAN: 60
GLUCOSE BLD-MCNC: 95 MG/DL (ref 70–99)
LDL CHOLESTEROL DIRECT: 89 MG/DL
MAGNESIUM: 2.4 MG/DL (ref 1.8–2.4)
POTASSIUM SERPL-SCNC: 4.5 MMOL/L (ref 3.5–5.1)
SODIUM BLD-SCNC: 143 MMOL/L (ref 136–145)
T4 FREE: 1 NG/DL (ref 0.9–1.8)
TOTAL PROTEIN: 6.9 G/DL (ref 6.4–8.2)
TSH REFLEX: 4.59 UIU/ML (ref 0.27–4.2)
URIC ACID, SERUM: 4 MG/DL (ref 2.6–6)
VITAMIN B-12: >2000 PG/ML (ref 211–911)

## 2022-09-23 PROCEDURE — 4004F PT TOBACCO SCREEN RCVD TLK: CPT | Performed by: FAMILY MEDICINE

## 2022-09-23 PROCEDURE — G8417 CALC BMI ABV UP PARAM F/U: HCPCS | Performed by: FAMILY MEDICINE

## 2022-09-23 PROCEDURE — 99214 OFFICE O/P EST MOD 30 MIN: CPT | Performed by: FAMILY MEDICINE

## 2022-09-23 PROCEDURE — 1123F ACP DISCUSS/DSCN MKR DOCD: CPT | Performed by: FAMILY MEDICINE

## 2022-09-23 PROCEDURE — 3023F SPIROM DOC REV: CPT | Performed by: FAMILY MEDICINE

## 2022-09-23 PROCEDURE — G8399 PT W/DXA RESULTS DOCUMENT: HCPCS | Performed by: FAMILY MEDICINE

## 2022-09-23 PROCEDURE — 3044F HG A1C LEVEL LT 7.0%: CPT | Performed by: FAMILY MEDICINE

## 2022-09-23 PROCEDURE — G8427 DOCREV CUR MEDS BY ELIG CLIN: HCPCS | Performed by: FAMILY MEDICINE

## 2022-09-23 PROCEDURE — 1090F PRES/ABSN URINE INCON ASSESS: CPT | Performed by: FAMILY MEDICINE

## 2022-09-23 RX ORDER — ALBUTEROL SULFATE 90 UG/1
2 AEROSOL, METERED RESPIRATORY (INHALATION) EVERY 4 HOURS PRN
Qty: 1 EACH | Refills: 2 | Status: SHIPPED | OUTPATIENT
Start: 2022-09-23

## 2022-09-23 NOTE — PATIENT INSTRUCTIONS
Take 1 acetaminophen when you get up, one around lunch and one around supper. If you need extra, you can take up to 3 more in a day but not over this. See Dr. Johanna Fofana or his partner for knee injections.

## 2022-09-23 NOTE — PROGRESS NOTES
2022    Sara Gee (:  1940) is a 80 y.o. female, here for evaluation of the following chief complaint(s):  3 Month Follow-Up        ASSESSMENT/PLAN:    1. Atherosclerosis of both carotid arteries  2. Coronary artery disease due to lipid rich plaque  On Livalo because did not tolerate other statins. We prior authorize it yearly. Will need to be done again in    - LDL Cholesterol, Direct    3. Essential hypertension  BP Readings from Last 3 Encounters:   22 112/72   22 132/70   06/10/22 122/60     At goal and meeting medical guidelines. Continue treatment. 4. Type 2 diabetes mellitus with stage 3b chronic kidney disease, without long-term current use of insulin ([address]  At goal and meeting medical guidelines. Diet controlled. - Comprehensive Metabolic Panel  - TSH with Reflex  - Hemoglobin A1C    5. Stage 3b chronic kidney disease ([address]  Continue to monitor   - Comprehensive Metabolic Panel    6. Primary osteoarthritis of both knees  See ortho to consider injections. - Ingrid Carbajal MD, Orthopedics and Sports Medicine (Shoulder; Hip; Knee), [facility]    7. Hyperuricemia  On Allopurinol. H/o gout in the past   - Uric Acid    8. Current use of proton pump inhibitor  Monitor for deficiency  - Vitamin B12  - Magnesium    9. Chronic obstructive pulmonary disease, unspecified COPD type ([address]  She is working on her smoking but still at 1 PPD., down from 2 ppd. She uses inhaler very infrequently   - albuterol sulfate HFA (PROAIR HFA) 108 (90 Base) MCG/ACT inhaler; Inhale 2 puffs into the lungs every 4 hours as needed for Wheezing or Shortness of Breath (cough, shortness breath or wheezing.)  Dispense: 1 each; Refill: 2      FOLLOW UP:  Return in about 4 months (around 2023) for med check up; OK for 3 months if easier on her. Tala Snowden HPI    Still working on trying to quit smoking. Still at 1 ppd.   Has been 2 full months at 1 ppd   She will quit when she really wants to quit. Some days she finds she may have 1-3 cigarettes left in her 1 ppd. Does not want medication for this. Is thinking about it and working on it. Watches a lot of TV and takes 2 naps of 3 hours  each. She smokes a lot in between her naps. She is otherwise doing well. Feels better once she started taking iron pills. Her knees are painful. Does not think Voltaren gel helped them any. Is not able to use NSAIDs or COXIs due to renal function. Outpatient Medications Marked as Taking for the 9/23/22 encounter (Office Visit) with Concecpion Craig MD   Medication Sig Dispense Refill    valsartan (DIOVAN) 320 MG tablet TAKE 1 TABLET BY MOUTH DAILY. 90 tablet 3    pantoprazole (PROTONIX) 40 MG tablet TAKE 1 TABLET BY MOUTH EVERY MORNING BEFORE BREAKFAST 90 tablet 2    LIVALO 4 MG TABS tablet TAKE 1 TABLET NIGHTLY 90 tablet 3    metoprolol tartrate (LOPRESSOR) 50 MG tablet TAKE 1 TABLET TWICE A  tablet 3    DULoxetine (CYMBALTA) 60 MG extended release capsule TAKE 1 CAPSULE DAILY WITH A 30 MG CAPSULE FOR ARTHRITIS PAIN AND MOOD 90 capsule 1    DULoxetine (CYMBALTA) 30 MG extended release capsule Take 30 mg with a 60 mg capsule for a total of 90 mg per day.  90 capsule 1    allopurinol (ZYLOPRIM) 300 MG tablet Take 1 tablet by mouth daily To protect kidneys and prevent joint pain from gout 90 tablet 3    nitroGLYCERIN (NITROSTAT) 0.4 MG SL tablet DISSOLVE ONE TABLET UNDER TONGUE AS NEEDED FOR CHEST PAIN EVERY 5 MINUTES 25 tablet 0    ELIQUIS 2.5 MG TABS tablet TAKE 1 TABLET BY MOUTH TWICE DAILY      fluticasone (FLONASE) 50 MCG/ACT nasal spray USE 1 SPRAY NASALLY DAILY 16 g 5    meclizine (ANTIVERT) 12.5 MG tablet Take 1-2 tablets by mouth daily as needed for Dizziness 30 tablet 2    Biotin 1 MG CAPS Take by mouth      acetaminophen (APAP EXTRA STRENGTH) 500 MG tablet Take 2 tablets by mouth 3 times daily as needed for Pain 100 tablet prn    albuterol sulfate HFA (PROAIR HFA) 108 (90 Base) MCG/ACT inhaler Inhale 2 puffs into the lungs every 4 hours as needed for Wheezing or Shortness of Breath (cough, shortness breath or wheezing.) 1 Inhaler 2    Multiple Vitamins-Minerals (THERAPEUTIC MULTIVITAMIN-MINERALS) tablet Take 1 tablet by mouth daily      vitamin B-12 (CYANOCOBALAMIN) 1000 MCG tablet Take 2 tablets by mouth daily 30 tablet     aspirin 81 MG tablet Take 81 mg by mouth daily         Review of Systems    OBJECTIVE:    Vitals:    09/23/22 1501   BP: 112/72   Pulse: 62   SpO2: 98%   Weight: 219 lb (99.3 kg)   Height: 5' (1.524 m)       Physical Exam  Vitals reviewed. Constitutional:       Appearance: She is well-developed. Cardiovascular:      Rate and Rhythm: Normal rate and regular rhythm. Heart sounds: Normal heart sounds. Pulmonary:      Effort: Pulmonary effort is normal.      Breath sounds: Normal breath sounds. Skin:     General: Skin is warm and dry. Coloration: Skin is not pale. Findings: No erythema. Psychiatric:         Mood and Affect: Mood and affect normal. Mood is not depressed. Behavior: Behavior normal.         Cognition and Memory: Cognition normal.      Comments: pleasant         An electronic signature was used to authenticate this note.     Miguel Elise MD

## 2022-09-24 LAB
ESTIMATED AVERAGE GLUCOSE: 125.5 MG/DL
HBA1C MFR BLD: 6 %

## 2022-11-11 ENCOUNTER — OFFICE VISIT (OUTPATIENT)
Dept: INTERNAL MEDICINE CLINIC | Age: 82
End: 2022-11-11
Payer: MEDICARE

## 2022-11-11 VITALS
BODY MASS INDEX: 43.67 KG/M2 | HEART RATE: 71 BPM | WEIGHT: 223.6 LBS | OXYGEN SATURATION: 94 % | SYSTOLIC BLOOD PRESSURE: 144 MMHG | DIASTOLIC BLOOD PRESSURE: 82 MMHG

## 2022-11-11 DIAGNOSIS — J44.9 COPD, MILD (HCC): ICD-10-CM

## 2022-11-11 DIAGNOSIS — L20.84 INTRINSIC ECZEMA: Primary | ICD-10-CM

## 2022-11-11 DIAGNOSIS — Z23 NEED FOR INFLUENZA VACCINATION: ICD-10-CM

## 2022-11-11 DIAGNOSIS — H91.93 BILATERAL HEARING LOSS, UNSPECIFIED HEARING LOSS TYPE: ICD-10-CM

## 2022-11-11 DIAGNOSIS — T75.3XXD MOTION SICKNESS, SUBSEQUENT ENCOUNTER: ICD-10-CM

## 2022-11-11 PROCEDURE — 3023F SPIROM DOC REV: CPT | Performed by: FAMILY MEDICINE

## 2022-11-11 PROCEDURE — G8399 PT W/DXA RESULTS DOCUMENT: HCPCS | Performed by: FAMILY MEDICINE

## 2022-11-11 PROCEDURE — 4004F PT TOBACCO SCREEN RCVD TLK: CPT | Performed by: FAMILY MEDICINE

## 2022-11-11 PROCEDURE — G8417 CALC BMI ABV UP PARAM F/U: HCPCS | Performed by: FAMILY MEDICINE

## 2022-11-11 PROCEDURE — G8484 FLU IMMUNIZE NO ADMIN: HCPCS | Performed by: FAMILY MEDICINE

## 2022-11-11 PROCEDURE — G8427 DOCREV CUR MEDS BY ELIG CLIN: HCPCS | Performed by: FAMILY MEDICINE

## 2022-11-11 PROCEDURE — 3074F SYST BP LT 130 MM HG: CPT | Performed by: FAMILY MEDICINE

## 2022-11-11 PROCEDURE — 90694 VACC AIIV4 NO PRSRV 0.5ML IM: CPT | Performed by: FAMILY MEDICINE

## 2022-11-11 PROCEDURE — 99213 OFFICE O/P EST LOW 20 MIN: CPT | Performed by: FAMILY MEDICINE

## 2022-11-11 PROCEDURE — G0008 ADMIN INFLUENZA VIRUS VAC: HCPCS | Performed by: FAMILY MEDICINE

## 2022-11-11 PROCEDURE — 1123F ACP DISCUSS/DSCN MKR DOCD: CPT | Performed by: FAMILY MEDICINE

## 2022-11-11 PROCEDURE — 1090F PRES/ABSN URINE INCON ASSESS: CPT | Performed by: FAMILY MEDICINE

## 2022-11-11 PROCEDURE — 3078F DIAST BP <80 MM HG: CPT | Performed by: FAMILY MEDICINE

## 2022-11-11 RX ORDER — AMMONIUM LACTATE 12 G/100G
CREAM TOPICAL
Qty: 1 EACH | Refills: 5 | Status: SHIPPED | OUTPATIENT
Start: 2022-11-11 | End: 2022-12-11

## 2022-11-11 RX ORDER — TRIAMCINOLONE ACETONIDE 5 MG/G
CREAM TOPICAL
Qty: 30 G | Refills: 3 | Status: SHIPPED | OUTPATIENT
Start: 2022-11-11 | End: 2022-11-18

## 2022-11-11 RX ORDER — MECLIZINE HCL 12.5 MG/1
12.5-25 TABLET ORAL DAILY PRN
Qty: 30 TABLET | Refills: 5 | Status: SHIPPED | OUTPATIENT
Start: 2022-11-11

## 2022-11-11 NOTE — PROGRESS NOTES
2022    Kim Vincent (:  1940) is a 80 y.o. female, here for evaluation of the following chief complaint(s):  Follow-up        ASSESSMENT/PLAN:    1. Intrinsic eczema  Scaling on lower legs and arms. Suggest daily use. Prn steroid cream use on upper posterior back which is a red macule from rubbing.    - ammonium lactate (LAC-HYDRIN) 12 % cream; Apply topically to arms daily and also to legs if excess scaling. Dispense: 1 each; Refill: 5  - triamcinolone (ARISTOCORT) 0.5 % cream; Apply topically 2 times daily to red itchy skin rash until clear. Dispense: 30 g; Refill: 3    2. Bilateral hearing loss, unspecified hearing loss type  Discussed possiblity of cochlear implant. She will think about it and discuss with her daughters. Info given. 3. Motion sickness, subsequent encounter  - meclizine (ANTIVERT) 12.5 MG tablet; Take 1-2 tablets by mouth daily as needed for Dizziness  Dispense: 30 tablet; Refill: 5    4. COPD, mild ([address]  Still smoking. 5. Need for influenza vaccination  - Influenza, FLUAD, (age 72 y+), IM, Preservative Free, 0.5 mL    FOLLOW UP:  Return in about 4 months (around 3/11/2023). HPI    She itches a lot on the back for the neck/upper back. She uses Vaseline intensive care cream on her legs and arms. It helps some. She is still smoking. No change. Is not active so does not notice any symptoms. She is hard of hearing from \"otosclerosis\"  also has reduced vision so hearing and vision both impaired. Would like refill on her dizziness pills.       Outpatient Medications Marked as Taking for the 22 encounter (Office Visit) with Valerie Tom MD   Medication Sig Dispense Refill    albuterol sulfate HFA (PROAIR HFA) 108 (90 Base) MCG/ACT inhaler Inhale 2 puffs into the lungs every 4 hours as needed for Wheezing or Shortness of Breath (cough, shortness breath or wheezing.) 1 each 2    valsartan (DIOVAN) 320 MG tablet TAKE 1 TABLET BY MOUTH DAILY. 90 tablet 3    pantoprazole (PROTONIX) 40 MG tablet TAKE 1 TABLET BY MOUTH EVERY MORNING BEFORE BREAKFAST 90 tablet 2    LIVALO 4 MG TABS tablet TAKE 1 TABLET NIGHTLY 90 tablet 3    metoprolol tartrate (LOPRESSOR) 50 MG tablet TAKE 1 TABLET TWICE A  tablet 3    DULoxetine (CYMBALTA) 60 MG extended release capsule TAKE 1 CAPSULE DAILY WITH A 30 MG CAPSULE FOR ARTHRITIS PAIN AND MOOD 90 capsule 1    DULoxetine (CYMBALTA) 30 MG extended release capsule Take 30 mg with a 60 mg capsule for a total of 90 mg per day. 90 capsule 1    allopurinol (ZYLOPRIM) 300 MG tablet Take 1 tablet by mouth daily To protect kidneys and prevent joint pain from gout 90 tablet 3    nitroGLYCERIN (NITROSTAT) 0.4 MG SL tablet DISSOLVE ONE TABLET UNDER TONGUE AS NEEDED FOR CHEST PAIN EVERY 5 MINUTES 25 tablet 0    ELIQUIS 2.5 MG TABS tablet TAKE 1 TABLET BY MOUTH TWICE DAILY      fluticasone (FLONASE) 50 MCG/ACT nasal spray USE 1 SPRAY NASALLY DAILY 16 g 5    meclizine (ANTIVERT) 12.5 MG tablet Take 1-2 tablets by mouth daily as needed for Dizziness 30 tablet 2    Biotin 1 MG CAPS Take by mouth      acetaminophen (APAP EXTRA STRENGTH) 500 MG tablet Take 2 tablets by mouth 3 times daily as needed for Pain 100 tablet prn    Multiple Vitamins-Minerals (THERAPEUTIC MULTIVITAMIN-MINERALS) tablet Take 1 tablet by mouth daily      vitamin B-12 (CYANOCOBALAMIN) 1000 MCG tablet Take 2 tablets by mouth daily 30 tablet     aspirin 81 MG tablet Take 81 mg by mouth daily         Review of Systems    OBJECTIVE:    Vitals:    11/11/22 1455   BP: (!) 144/82   Site: Right Upper Arm   Position: Sitting   Cuff Size: Large Adult   Pulse: 71   SpO2: 94%   Weight: 223 lb 9.6 oz (101.4 kg)       Physical Exam  Vitals reviewed. Constitutional:       Appearance: She is well-developed. HENT:      Right Ear: Decreased hearing noted. Left Ear: Decreased hearing noted.       Ears:      Comments: Very hard of hearing  Cardiovascular:      Rate and Rhythm: Normal rate and regular rhythm. Heart sounds: Normal heart sounds. Pulmonary:      Effort: Pulmonary effort is normal.      Breath sounds: Normal breath sounds. Skin:     General: Skin is warm and dry. Coloration: Skin is not pale. Findings: Rash present. No erythema. Rash is macular and scaling. Comments: Scaling on extensor forearms,  lower legs. Macular, possibly urticarial spot on right post shoulder. Psychiatric:         Behavior: Behavior normal.         An electronic signature was used to authenticate this note.     Chiara Doshi MD

## 2022-11-12 PROBLEM — L20.84 INTRINSIC ECZEMA: Status: ACTIVE | Noted: 2022-11-12

## 2023-01-19 ENCOUNTER — TELEPHONE (OUTPATIENT)
Dept: INTERNAL MEDICINE CLINIC | Age: 83
End: 2023-01-19

## 2023-01-19 DIAGNOSIS — R11.0 NAUSEA: ICD-10-CM

## 2023-01-19 RX ORDER — PROMETHAZINE HYDROCHLORIDE 25 MG/1
12.5 TABLET ORAL EVERY 6 HOURS PRN
Qty: 20 TABLET | Refills: 1 | Status: SHIPPED | OUTPATIENT
Start: 2023-01-19

## 2023-01-19 NOTE — TELEPHONE ENCOUNTER
Pt daughter calling---pt is asking for Pronethazine ---was on it in the past---she is having a lot of dizziness and a lot of nausea---please send to [employer] and [employer]---if you don't want to give tushar that please let them know. Thanks.

## 2023-02-12 DIAGNOSIS — F33.41 RECURRENT MAJOR DEPRESSIVE DISORDER, IN PARTIAL REMISSION (HCC): ICD-10-CM

## 2023-02-12 RX ORDER — PANTOPRAZOLE SODIUM 40 MG/1
TABLET, DELAYED RELEASE ORAL
Qty: 90 TABLET | Refills: 2 | Status: SHIPPED | OUTPATIENT
Start: 2023-02-12

## 2023-02-12 RX ORDER — DULOXETIN HYDROCHLORIDE 30 MG/1
CAPSULE, DELAYED RELEASE ORAL
Qty: 90 CAPSULE | Refills: 1 | Status: SHIPPED | OUTPATIENT
Start: 2023-02-12

## 2023-03-01 ENCOUNTER — TELEPHONE (OUTPATIENT)
Dept: ORTHOPEDIC SURGERY | Age: 83
End: 2023-03-01

## 2023-03-01 NOTE — TELEPHONE ENCOUNTER
Submitted PA for Livalo 4MG tablets  Via CMM Key: [id number]  STATUS: APPROVED through 12/31/2023; Approval letter attached. If this requires a response please respond to the pool ( [id number] [facility]). Thank you please advise patient.

## 2023-03-15 ENCOUNTER — OFFICE VISIT (OUTPATIENT)
Dept: INTERNAL MEDICINE CLINIC | Age: 83
End: 2023-03-15
Payer: MEDICARE

## 2023-03-15 VITALS
SYSTOLIC BLOOD PRESSURE: 116 MMHG | HEIGHT: 60 IN | DIASTOLIC BLOOD PRESSURE: 72 MMHG | HEART RATE: 68 BPM | BODY MASS INDEX: 42.21 KG/M2 | OXYGEN SATURATION: 98 % | WEIGHT: 215 LBS

## 2023-03-15 DIAGNOSIS — N64.4 BREAST PAIN, RIGHT: ICD-10-CM

## 2023-03-15 DIAGNOSIS — R30.9 PAIN PASSING URINE: Primary | ICD-10-CM

## 2023-03-15 LAB
BILIRUBIN, POC: ABNORMAL
BLOOD URINE, POC: ABNORMAL
CLARITY, POC: CLEAR
COLOR, POC: YELLOW
GLUCOSE URINE, POC: ABNORMAL
KETONES, POC: ABNORMAL
LEUKOCYTE EST, POC: ABNORMAL
NITRITE, POC: ABNORMAL
PH, POC: 6
PROTEIN, POC: ABNORMAL
SPECIFIC GRAVITY, POC: 1.02
UROBILINOGEN, POC: 0.2

## 2023-03-15 PROCEDURE — 3074F SYST BP LT 130 MM HG: CPT | Performed by: FAMILY MEDICINE

## 2023-03-15 PROCEDURE — G8427 DOCREV CUR MEDS BY ELIG CLIN: HCPCS | Performed by: FAMILY MEDICINE

## 2023-03-15 PROCEDURE — 99214 OFFICE O/P EST MOD 30 MIN: CPT | Performed by: FAMILY MEDICINE

## 2023-03-15 PROCEDURE — 1123F ACP DISCUSS/DSCN MKR DOCD: CPT | Performed by: FAMILY MEDICINE

## 2023-03-15 PROCEDURE — 1090F PRES/ABSN URINE INCON ASSESS: CPT | Performed by: FAMILY MEDICINE

## 2023-03-15 PROCEDURE — 3288F FALL RISK ASSESSMENT DOCD: CPT | Performed by: FAMILY MEDICINE

## 2023-03-15 PROCEDURE — 4004F PT TOBACCO SCREEN RCVD TLK: CPT | Performed by: FAMILY MEDICINE

## 2023-03-15 PROCEDURE — 3078F DIAST BP <80 MM HG: CPT | Performed by: FAMILY MEDICINE

## 2023-03-15 PROCEDURE — G8399 PT W/DXA RESULTS DOCUMENT: HCPCS | Performed by: FAMILY MEDICINE

## 2023-03-15 PROCEDURE — G8484 FLU IMMUNIZE NO ADMIN: HCPCS | Performed by: FAMILY MEDICINE

## 2023-03-15 PROCEDURE — 81002 URINALYSIS NONAUTO W/O SCOPE: CPT | Performed by: FAMILY MEDICINE

## 2023-03-15 PROCEDURE — G8417 CALC BMI ABV UP PARAM F/U: HCPCS | Performed by: FAMILY MEDICINE

## 2023-03-15 RX ORDER — SULFAMETHOXAZOLE AND TRIMETHOPRIM 800; 160 MG/1; MG/1
1 TABLET ORAL 2 TIMES DAILY
Qty: 6 TABLET | Refills: 0 | Status: SHIPPED | OUTPATIENT
Start: 2023-03-15 | End: 2023-03-18

## 2023-03-15 RX ORDER — LIDOCAINE 5% 5 G/100G
CREAM TOPICAL
Qty: 1 EACH | COMMUNITY
Start: 2023-03-15

## 2023-03-15 SDOH — ECONOMIC STABILITY: HOUSING INSECURITY
IN THE LAST 12 MONTHS, WAS THERE A TIME WHEN YOU DID NOT HAVE A STEADY PLACE TO SLEEP OR SLEPT IN A SHELTER (INCLUDING NOW)?: NO

## 2023-03-15 SDOH — ECONOMIC STABILITY: FOOD INSECURITY: WITHIN THE PAST 12 MONTHS, YOU WORRIED THAT YOUR FOOD WOULD RUN OUT BEFORE YOU GOT MONEY TO BUY MORE.: NEVER TRUE

## 2023-03-15 SDOH — ECONOMIC STABILITY: INCOME INSECURITY: HOW HARD IS IT FOR YOU TO PAY FOR THE VERY BASICS LIKE FOOD, HOUSING, MEDICAL CARE, AND HEATING?: NOT HARD AT ALL

## 2023-03-15 SDOH — ECONOMIC STABILITY: FOOD INSECURITY: WITHIN THE PAST 12 MONTHS, THE FOOD YOU BOUGHT JUST DIDN'T LAST AND YOU DIDN'T HAVE MONEY TO GET MORE.: NEVER TRUE

## 2023-03-15 ASSESSMENT — PATIENT HEALTH QUESTIONNAIRE - PHQ9
4. FEELING TIRED OR HAVING LITTLE ENERGY: 0
SUM OF ALL RESPONSES TO PHQ QUESTIONS 1-9: 0
SUM OF ALL RESPONSES TO PHQ9 QUESTIONS 1 & 2: 0
10. IF YOU CHECKED OFF ANY PROBLEMS, HOW DIFFICULT HAVE THESE PROBLEMS MADE IT FOR YOU TO DO YOUR WORK, TAKE CARE OF THINGS AT HOME, OR GET ALONG WITH OTHER PEOPLE: 0
9. THOUGHTS THAT YOU WOULD BE BETTER OFF DEAD, OR OF HURTING YOURSELF: 0
SUM OF ALL RESPONSES TO PHQ QUESTIONS 1-9: 0
2. FEELING DOWN, DEPRESSED OR HOPELESS: 0
6. FEELING BAD ABOUT YOURSELF - OR THAT YOU ARE A FAILURE OR HAVE LET YOURSELF OR YOUR FAMILY DOWN: 0
5. POOR APPETITE OR OVEREATING: 0
SUM OF ALL RESPONSES TO PHQ QUESTIONS 1-9: 0
1. LITTLE INTEREST OR PLEASURE IN DOING THINGS: 0
SUM OF ALL RESPONSES TO PHQ QUESTIONS 1-9: 0
3. TROUBLE FALLING OR STAYING ASLEEP: 0
7. TROUBLE CONCENTRATING ON THINGS, SUCH AS READING THE NEWSPAPER OR WATCHING TELEVISION: 0
8. MOVING OR SPEAKING SO SLOWLY THAT OTHER PEOPLE COULD HAVE NOTICED. OR THE OPPOSITE, BEING SO FIGETY OR RESTLESS THAT YOU HAVE BEEN MOVING AROUND A LOT MORE THAN USUAL: 0

## 2023-03-15 NOTE — PATIENT INSTRUCTIONS
If you go to [facility], call [facility] to schedule or on the order we give you. [place] [facility] [place], you will have to get the phone number to schedule. Use Lidocaine cream over the counter as needed.

## 2023-03-15 NOTE — PROGRESS NOTES
Here with daughter Yomaira Rosas    3/15/2023    Jb Love (:  1940) is a 80 y.o. female, here for evaluation of the following chief complaint(s):  Dysuria and Breast Pain (Breast pain for 2 weeks. /Urine pain for 3 weeks. )    Missed her scheduled follow up appt 2 days ago for routine follow up. Today is acute visit for 2 new  unrelated problems. ASSESSMENT/PLAN:    1. Pain passing urine  Trace leuks on POC urine. Will treat with 3 days of Bactrim and culture urine. Drink more water.   - POCT Urinalysis no Micro  - Culture, Urine    2. Breast pain, right  There is lumpiness present in both breasts. Possible small mobile LN palpable right pectoral.    No skin changes and including nipple. Suggest prn lidocaine cream otc and get mammogram.   - [facility] ALYSIA DIGITAL DIAGNOSTIC BILATERAL; Future        FOLLOW UP:  Schedule appt soon for routine med check up    HPI    C/o urine pain for 3 weeks:  when the urine comes out, she will then have some pressure pain. Estimates that she passes only \"half a cup\" of urine when she goes. .  The discomfort is in the urethra and not in the abdomen. No fever. Just started to drink more water. No flank pain. No vaginal itch  or discharge. C/o breast pain x 2 weeks. Right breast.  Feels like pins and needles coming through her skin. It feels like  the skin is chapped but it looks OK and normal.      Used lotion on the skin and that does not help the discomfort at all. No rash or skin changes. It feels like it starts right chest wall inferior to the axilla and radiates to the lateral breast and into the nipple. No skin changes.        Outpatient Medications Marked as Taking for the 3/15/23 encounter (Office Visit) with Aly Henley MD   Medication Sig Dispense Refill    DULoxetine (CYMBALTA) 30 MG extended release capsule TAKE ONE CAPSULE BY MOUTH ALONG WITH 60 MG CAPSULE TO EQUAL 90 MG PER DAY 90 capsule 1    pantoprazole (PROTONIX) 40 MG tablet TAKE 1 TABLET BY MOUTH EVERY MORNING BEFORE BREAKFAST 90 tablet 2    albuterol sulfate HFA (PROAIR HFA) 108 (90 Base) MCG/ACT inhaler Inhale 2 puffs into the lungs every 4 hours as needed for Wheezing or Shortness of Breath (cough, shortness breath or wheezing.) 1 each 2    valsartan (DIOVAN) 320 MG tablet TAKE 1 TABLET BY MOUTH DAILY. 90 tablet 3    LIVALO 4 MG TABS tablet TAKE 1 TABLET NIGHTLY 90 tablet 3    metoprolol tartrate (LOPRESSOR) 50 MG tablet TAKE 1 TABLET TWICE A  tablet 3    DULoxetine (CYMBALTA) 60 MG extended release capsule TAKE 1 CAPSULE DAILY WITH A 30 MG CAPSULE FOR ARTHRITIS PAIN AND MOOD 90 capsule 1    allopurinol (ZYLOPRIM) 300 MG tablet Take 1 tablet by mouth daily To protect kidneys and prevent joint pain from gout 90 tablet 3    ELIQUIS 2.5 MG TABS tablet TAKE 1 TABLET BY MOUTH TWICE DAILY      fluticasone (FLONASE) 50 MCG/ACT nasal spray USE 1 SPRAY NASALLY DAILY 16 g 5    Biotin 1 MG CAPS Take by mouth      acetaminophen (APAP EXTRA STRENGTH) 500 MG tablet Take 2 tablets by mouth 3 times daily as needed for Pain 100 tablet prn    Multiple Vitamins-Minerals (THERAPEUTIC MULTIVITAMIN-MINERALS) tablet Take 1 tablet by mouth daily      vitamin B-12 (CYANOCOBALAMIN) 1000 MCG tablet Take 2 tablets by mouth daily 30 tablet     aspirin 81 MG tablet Take 81 mg by mouth daily         Review of Systems    OBJECTIVE:    Vitals:    03/15/23 1617   BP: 116/72   Pulse: 68   SpO2: 98%   Weight: 215 lb (97.5 kg)   Height: 5' (1.524 m)       Physical Exam  Exam conducted with a chaperone present. Constitutional:       Appearance: Normal appearance. Eyes:      General: No scleral icterus. Pulmonary:      Effort: Pulmonary effort is normal. No respiratory distress. Chest:      Chest wall: No mass, deformity, swelling or tenderness. Breasts:     Breasts are symmetrical.      Right: Tenderness (diffuse but lei lateral lower breast) present. No swelling, bleeding, inverted nipple or skin change. Left: Tenderness (same as right but less intense) present. No swelling, bleeding, inverted nipple, nipple discharge or skin change. Comments: She is fine with her daughter being present  Areas of lumpiness both breasts:  right > left. Lymphadenopathy:      Upper Body:      Right upper body: Pectoral adenopathy present. No supraclavicular or axillary adenopathy. Left upper body: No supraclavicular, axillary or pectoral adenopathy. Skin:     Coloration: Skin is not pale. Findings: No bruising, erythema, rash or wound. Neurological:      General: No focal deficit present. Mental Status: She is alert and oriented to person, place, and time. Psychiatric:         Mood and Affect: Mood normal.         Behavior: Behavior normal.         An electronic signature was used to authenticate this note.     Santa Mejía MD

## 2023-03-16 ENCOUNTER — TELEPHONE (OUTPATIENT)
Dept: INTERNAL MEDICINE CLINIC | Age: 83
End: 2023-03-16

## 2023-03-16 DIAGNOSIS — N64.4 BREAST PAIN, RIGHT: Primary | ICD-10-CM

## 2023-03-16 NOTE — TELEPHONE ENCOUNTER
[facility]because [facility] mamogram was ordered they also need order for US breast bilateral  ----Thanks.

## 2023-03-17 LAB
BACTERIA UR CULT: ABNORMAL
BACTERIA UR CULT: ABNORMAL
ORGANISM: ABNORMAL

## 2023-03-19 NOTE — RESULT ENCOUNTER NOTE
Small number of bacteria on urine culture. Hopefully the 3 days of antibiotic cleared or helped your symptoms.

## 2023-03-20 ENCOUNTER — TELEPHONE (OUTPATIENT)
Dept: INTERNAL MEDICINE CLINIC | Age: 83
End: 2023-03-20

## 2023-03-20 DIAGNOSIS — R30.9 PAIN PASSING URINE: ICD-10-CM

## 2023-03-20 RX ORDER — SULFAMETHOXAZOLE AND TRIMETHOPRIM 800; 160 MG/1; MG/1
1 TABLET ORAL 2 TIMES DAILY
Qty: 10 TABLET | Refills: 0 | Status: SHIPPED | OUTPATIENT
Start: 2023-03-20 | End: 2023-03-25

## 2023-03-20 NOTE — TELEPHONE ENCOUNTER
----- Message from Merly Plata MD sent at 3/19/2023 12:37 PM EDT -----  Small number of bacteria on urine culture. Hopefully the 3 days of antibiotic cleared or helped your symptoms.

## 2023-03-28 ENCOUNTER — HOSPITAL ENCOUNTER (OUTPATIENT)
Dept: ULTRASOUND IMAGING | Age: 83
Discharge: HOME | End: 2023-03-28
Payer: MEDICARE

## 2023-03-28 ENCOUNTER — HOSPITAL ENCOUNTER (OUTPATIENT)
Dept: WOMENS IMAGING | Age: 83
Discharge: HOME | End: 2023-03-28
Payer: MEDICARE

## 2023-03-28 VITALS — WEIGHT: 215 LBS | BODY MASS INDEX: 42.21 KG/M2 | HEIGHT: 60 IN

## 2023-03-28 DIAGNOSIS — N64.4 BREAST PAIN, RIGHT: ICD-10-CM

## 2023-03-28 PROCEDURE — G0279 TOMOSYNTHESIS, MAMMO: HCPCS

## 2023-03-28 PROCEDURE — 76642 ULTRASOUND BREAST LIMITED: CPT

## 2023-03-28 NOTE — RESULT ENCOUNTER NOTE
Normal mammogram and ultrasound. Nothing looks like cancer. The pain could be coming from the muscles under the breast or from sensitive skin.

## 2023-03-29 ENCOUNTER — OFFICE VISIT (OUTPATIENT)
Dept: INTERNAL MEDICINE CLINIC | Age: 83
End: 2023-03-29

## 2023-03-29 VITALS
BODY MASS INDEX: 42.21 KG/M2 | SYSTOLIC BLOOD PRESSURE: 118 MMHG | HEART RATE: 58 BPM | DIASTOLIC BLOOD PRESSURE: 70 MMHG | HEIGHT: 60 IN | WEIGHT: 215 LBS | OXYGEN SATURATION: 97 %

## 2023-03-29 DIAGNOSIS — F33.42 RECURRENT MAJOR DEPRESSIVE DISORDER, IN FULL REMISSION (HCC): ICD-10-CM

## 2023-03-29 DIAGNOSIS — N18.32 STAGE 3B CHRONIC KIDNEY DISEASE (HCC): ICD-10-CM

## 2023-03-29 DIAGNOSIS — N30.00 ACUTE CYSTITIS WITHOUT HEMATURIA: ICD-10-CM

## 2023-03-29 DIAGNOSIS — N18.32 TYPE 2 DIABETES MELLITUS WITH STAGE 3B CHRONIC KIDNEY DISEASE, WITHOUT LONG-TERM CURRENT USE OF INSULIN (HCC): Primary | ICD-10-CM

## 2023-03-29 DIAGNOSIS — D50.9 IRON DEFICIENCY ANEMIA, UNSPECIFIED IRON DEFICIENCY ANEMIA TYPE: ICD-10-CM

## 2023-03-29 DIAGNOSIS — Z86.711 HISTORY OF PULMONARY EMBOLISM: ICD-10-CM

## 2023-03-29 DIAGNOSIS — Z00.00 MEDICARE ANNUAL WELLNESS VISIT, SUBSEQUENT: Primary | ICD-10-CM

## 2023-03-29 DIAGNOSIS — Z79.01 CHRONIC ANTICOAGULATION: ICD-10-CM

## 2023-03-29 DIAGNOSIS — E79.0 HYPERURICEMIA: ICD-10-CM

## 2023-03-29 DIAGNOSIS — E78.00 HYPERCHOLESTEREMIA: ICD-10-CM

## 2023-03-29 DIAGNOSIS — E70.30 ALBINOIDISM (HCC): ICD-10-CM

## 2023-03-29 DIAGNOSIS — J44.9 COPD, MILD (HCC): ICD-10-CM

## 2023-03-29 DIAGNOSIS — E11.22 TYPE 2 DIABETES MELLITUS WITH STAGE 3B CHRONIC KIDNEY DISEASE, WITHOUT LONG-TERM CURRENT USE OF INSULIN (HCC): Primary | ICD-10-CM

## 2023-03-29 PROBLEM — I26.99 PULMONARY EMBOLISM WITHOUT ACUTE COR PULMONALE (HCC): Status: RESOLVED | Noted: 2021-02-09 | Resolved: 2023-03-29

## 2023-03-29 LAB
ALBUMIN SERPL-MCNC: 4.4 G/DL (ref 3.4–5)
ALBUMIN/GLOB SERPL: 1.8 {RATIO} (ref 1.1–2.2)
ALP SERPL-CCNC: 150 U/L (ref 40–129)
ALT SERPL-CCNC: 36 U/L (ref 10–40)
ANION GAP SERPL CALCULATED.3IONS-SCNC: 13 MMOL/L (ref 3–16)
AST SERPL-CCNC: 20 U/L (ref 15–37)
BASOPHILS # BLD: 0.1 K/UL (ref 0–0.2)
BASOPHILS NFR BLD: 0.7 %
BILIRUB SERPL-MCNC: 0.3 MG/DL (ref 0–1)
BILIRUB UR QL STRIP.AUTO: NEGATIVE
BUN SERPL-MCNC: 21 MG/DL (ref 7–20)
CALCIUM SERPL-MCNC: 10.3 MG/DL (ref 8.3–10.6)
CHLORIDE SERPL-SCNC: 104 MMOL/L (ref 99–110)
CHOLEST SERPL-MCNC: 176 MG/DL (ref 0–199)
CLARITY UR: CLEAR
CO2 SERPL-SCNC: 24 MMOL/L (ref 21–32)
COLOR UR: YELLOW
CREAT SERPL-MCNC: 1.1 MG/DL (ref 0.6–1.2)
DEPRECATED RDW RBC AUTO: 17.4 % (ref 12.4–15.4)
EOSINOPHIL # BLD: 0.3 K/UL (ref 0–0.6)
EOSINOPHIL NFR BLD: 2.7 %
GFR SERPLBLD CREATININE-BSD FMLA CKD-EPI: 50 ML/MIN/{1.73_M2}
GLUCOSE SERPL-MCNC: 100 MG/DL (ref 70–99)
GLUCOSE UR STRIP.AUTO-MCNC: NEGATIVE MG/DL
HCT VFR BLD AUTO: 41.3 % (ref 36–48)
HDLC SERPL-MCNC: 51 MG/DL (ref 40–60)
HGB BLD-MCNC: 13.6 G/DL (ref 12–16)
HGB UR QL STRIP.AUTO: NEGATIVE
KETONES UR STRIP.AUTO-MCNC: NEGATIVE MG/DL
LDL CHOLESTEROL CALCULATED: 95 MG/DL
LEUKOCYTE ESTERASE UR QL STRIP.AUTO: NEGATIVE
LYMPHOCYTES # BLD: 3.5 K/UL (ref 1–5.1)
LYMPHOCYTES NFR BLD: 32.4 %
MCH RBC QN AUTO: 30.2 PG (ref 26–34)
MCHC RBC AUTO-ENTMCNC: 32.8 G/DL (ref 31–36)
MCV RBC AUTO: 92 FL (ref 80–100)
MONOCYTES # BLD: 0.9 K/UL (ref 0–1.3)
MONOCYTES NFR BLD: 7.9 %
NEUTROPHILS # BLD: 6.1 K/UL (ref 1.7–7.7)
NEUTROPHILS NFR BLD: 56.3 %
NITRITE UR QL STRIP.AUTO: NEGATIVE
PH UR STRIP.AUTO: 6.5 [PH] (ref 5–8)
PLATELET # BLD AUTO: 310 K/UL (ref 135–450)
PMV BLD AUTO: 8.3 FL (ref 5–10.5)
POTASSIUM SERPL-SCNC: 4.1 MMOL/L (ref 3.5–5.1)
PROT SERPL-MCNC: 6.9 G/DL (ref 6.4–8.2)
PROT UR STRIP.AUTO-MCNC: NEGATIVE MG/DL
RBC # BLD AUTO: 4.49 M/UL (ref 4–5.2)
SODIUM SERPL-SCNC: 141 MMOL/L (ref 136–145)
SP GR UR STRIP.AUTO: 1.02 (ref 1–1.03)
TRIGL SERPL-MCNC: 151 MG/DL (ref 0–150)
UA COMPLETE W REFLEX CULTURE PNL UR: NORMAL
UA DIPSTICK W REFLEX MICRO PNL UR: NORMAL
URATE SERPL-MCNC: 3.9 MG/DL (ref 2.6–6)
URN SPEC COLLECT METH UR: NORMAL
UROBILINOGEN UR STRIP-ACNC: 0.2 E.U./DL
VLDLC SERPL CALC-MCNC: 30 MG/DL
WBC # BLD AUTO: 10.9 K/UL (ref 4–11)

## 2023-03-29 ASSESSMENT — PATIENT HEALTH QUESTIONNAIRE - PHQ9
8. MOVING OR SPEAKING SO SLOWLY THAT OTHER PEOPLE COULD HAVE NOTICED. OR THE OPPOSITE, BEING SO FIGETY OR RESTLESS THAT YOU HAVE BEEN MOVING AROUND A LOT MORE THAN USUAL: 0
10. IF YOU CHECKED OFF ANY PROBLEMS, HOW DIFFICULT HAVE THESE PROBLEMS MADE IT FOR YOU TO DO YOUR WORK, TAKE CARE OF THINGS AT HOME, OR GET ALONG WITH OTHER PEOPLE: 0
SUM OF ALL RESPONSES TO PHQ QUESTIONS 1-9: 3
5. POOR APPETITE OR OVEREATING: 0
2. FEELING DOWN, DEPRESSED OR HOPELESS: 1
9. THOUGHTS THAT YOU WOULD BE BETTER OFF DEAD, OR OF HURTING YOURSELF: 0
SUM OF ALL RESPONSES TO PHQ QUESTIONS 1-9: 3
SUM OF ALL RESPONSES TO PHQ9 QUESTIONS 1 & 2: 2
7. TROUBLE CONCENTRATING ON THINGS, SUCH AS READING THE NEWSPAPER OR WATCHING TELEVISION: 0
SUM OF ALL RESPONSES TO PHQ QUESTIONS 1-9: 3
6. FEELING BAD ABOUT YOURSELF - OR THAT YOU ARE A FAILURE OR HAVE LET YOURSELF OR YOUR FAMILY DOWN: 0
SUM OF ALL RESPONSES TO PHQ QUESTIONS 1-9: 3
3. TROUBLE FALLING OR STAYING ASLEEP: 1
4. FEELING TIRED OR HAVING LITTLE ENERGY: 0
1. LITTLE INTEREST OR PLEASURE IN DOING THINGS: 1

## 2023-03-29 ASSESSMENT — LIFESTYLE VARIABLES
HOW OFTEN DO YOU HAVE A DRINK CONTAINING ALCOHOL: NEVER
HOW MANY STANDARD DRINKS CONTAINING ALCOHOL DO YOU HAVE ON A TYPICAL DAY: PATIENT DOES NOT DRINK

## 2023-03-29 NOTE — PATIENT INSTRUCTIONS
Personalized Preventive Plan for Patrick Payne - 3/29/2023  Medicare offers a range of preventive health benefits. Some of the tests and screenings are paid in full while other may be subject to a deductible, co-insurance, and/or copay. Some of these benefits include a comprehensive review of your medical history including lifestyle, illnesses that may run in your family, and various assessments and screenings as appropriate. After reviewing your medical record and screening and assessments performed today your provider may have ordered immunizations, labs, imaging, and/or referrals for you. A list of these orders (if applicable) as well as your Preventive Care list are included within your After Visit Summary for your review. Other Preventive Recommendations:    A preventive eye exam performed by an eye specialist is recommended every 1-2 years to screen for glaucoma; cataracts, macular degeneration, and other eye disorders. A preventive dental visit is recommended every 6 months. Try to get at least 150 minutes of exercise per week or 10,000 steps per day on a pedometer . Order or download the FREE \"Exercise & Physical Activity: Your Everyday Guide\" from The "[employer]. Call 0-167.291.7591 or search The "[employer] online. You need 3674-1589 mg of calcium and 3761-7063 IU of vitamin D per day. It is possible to meet your calcium requirement with diet alone, but a vitamin D supplement is usually necessary to meet this goal.  When exposed to the sun, use a sunscreen that protects against both UVA and UVB radiation with an SPF of 30 or greater. Reapply every 2 to 3 hours or after sweating, drying off with a towel, or swimming. Always wear a seat belt when traveling in a car. Always wear a helmet when riding a bicycle or motorcycle.

## 2023-03-29 NOTE — PROGRESS NOTES
Medicare Annual Wellness Visit    Kely Gupat is here for Medicare AWV    Assessment & Plan   Medicare annual wellness visit, subsequent      Recommendations for Preventive Services Due: see orders and patient instructions/AVS.  Recommended screening schedule for the next 5-10 years is provided to the patient in written form: see Patient Instructions/AVS.     No follow-ups on file. Subjective   The following acute and/or chronic problems were also addressed today:  Discussed healthcare Gaps with patient and daughter. Due for shingles and Covid vaccines patient preference is to not complete. Patient education done on vaccines and also on safety measures for Cold and Flu season time of year to help protect her. ACP note completed with patient during visit. Patient's complete Health Risk Assessment and screening values have been reviewed and are found in Flowsheets. The following problems were reviewed today and where indicated follow up appointments were made and/or referrals ordered.     Positive Risk Factor Screenings with Interventions:    Fall Risk:  Do you feel unsteady or are you worried about falling? : (!) yes  2 or more falls in past year?: no  Fall with injury in past year?: no     Interventions:    See AVS for additional education material              Weight and Activity:  Physical Activity: Inactive    Days of Exercise per Week: 0 days    Minutes of Exercise per Session: 0 min     On average, how many days per week do you engage in moderate to strenuous exercise (like a brisk walk)?: 0 days  Have you lost any weight without trying in the past 3 months?: No         Inactivity Interventions:  See AVS for additional education material  Obesity Interventions:  See AVS for additional education material           Hearing Screen:  Do you or your family notice any trouble with your hearing that hasn't been managed with hearing aids?: (!) Yes    Interventions:  Patient declines any further evaluation or

## 2023-03-29 NOTE — PROGRESS NOTES
Neurological:      Mental Status: She is alert and oriented to person, place, and time. Motor: No tremor or abnormal muscle tone. Coordination: Coordination normal.      Gait: Gait normal.   Psychiatric:         Mood and Affect: Mood and affect normal.         Speech: Speech normal.         Behavior: Behavior normal.      Comments: Very pleasant       An electronic signature was used to authenticate this note.     Khadra Parks MD

## 2023-03-29 NOTE — ACP (ADVANCE CARE PLANNING)
Advance Care Planning     Advance Care Planning (ACP) Physician/NP/PA Conversation    Date of Conversation: 3/29/2023  Conducted with: Patient with Decision Making Capacity    Healthcare Decision Maker:      Primary Decision Maker: Jordyn Lincoln - Child - [phone]    Secondary Decision Maker: Haley Nolan - Child - [phone]    Click here to complete Healthcare Decision Makers including selection of the Healthcare Decision Maker Relationship (ie \"Primary\")      Care Preferences:    Hospitalization: \"If your health worsens and it becomes clear that your chance of recovery is unlikely, what would be your preference regarding hospitalization? \"  The patient would prefer comfort-focused treatment without hospitalization. Ventilation: \"If you were unable to breath on your own and your chance of recovery was unlikely, what would be your preference about the use of a ventilator (breathing machine) if it was available to you? \"  The patient would NOT desire the use of a ventilator. Resuscitation: \"In the event your heart stopped as a result of an underlying serious health condition, would you want attempts made to restart your heart, or would you prefer a natural death? \"  No, do NOT attempt to resuscitate. treatment goals    Conversation Outcomes / Follow-Up Plan:    Reviewed DNR/DNI and patient elects Full Code (Attempt Resuscitation)    Length of Voluntary ACP Conversation in minutes:  <16 minutes (Non-Billable)    Sherrill Cooper RN      This encounter was performed under myJustin MDs, direct supervision, 3/29/2023.

## 2023-03-30 LAB
EST. AVERAGE GLUCOSE BLD GHB EST-MCNC: 119.8 MG/DL
HBA1C MFR BLD: 5.8 %

## 2023-03-30 NOTE — RESULT ENCOUNTER NOTE
Good lab results except you need to drink more water. This is why the BUN is a bit high and your urine is a little concentrated. Urine infection is clear. .   no other changes in medication needed.

## 2023-06-24 ENCOUNTER — HOSPITAL ENCOUNTER (EMERGENCY)
Age: 83
Discharge: HOME | End: 2023-06-25
Attending: EMERGENCY MEDICINE
Payer: MEDICARE

## 2023-06-24 ENCOUNTER — APPOINTMENT (OUTPATIENT)
Dept: CT IMAGING | Age: 83
End: 2023-06-24
Payer: MEDICARE

## 2023-06-24 ENCOUNTER — APPOINTMENT (OUTPATIENT)
Dept: GENERAL RADIOLOGY | Age: 83
End: 2023-06-24
Payer: MEDICARE

## 2023-06-24 DIAGNOSIS — F07.81 POST CONCUSSIVE SYNDROME: ICD-10-CM

## 2023-06-24 DIAGNOSIS — S09.90XA CLOSED HEAD INJURY, INITIAL ENCOUNTER: Primary | ICD-10-CM

## 2023-06-24 LAB
ALBUMIN SERPL-MCNC: 4.1 G/DL (ref 3.4–5)
ALBUMIN/GLOB SERPL: 1.5 {RATIO} (ref 1.1–2.2)
ALP SERPL-CCNC: 137 U/L (ref 40–129)
ALT SERPL-CCNC: 11 U/L (ref 10–40)
ANION GAP SERPL CALCULATED.3IONS-SCNC: 12 MMOL/L (ref 3–16)
APTT BLD: 36.7 SEC (ref 22.7–35.9)
AST SERPL-CCNC: 17 U/L (ref 15–37)
BASOPHILS # BLD: 0.1 K/UL (ref 0–0.2)
BASOPHILS NFR BLD: 0.9 %
BILIRUB SERPL-MCNC: <0.2 MG/DL (ref 0–1)
BUN SERPL-MCNC: 22 MG/DL (ref 7–20)
CALCIUM SERPL-MCNC: 10 MG/DL (ref 8.3–10.6)
CHLORIDE SERPL-SCNC: 104 MMOL/L (ref 99–110)
CO2 SERPL-SCNC: 26 MMOL/L (ref 21–32)
CREAT SERPL-MCNC: 1 MG/DL (ref 0.6–1.2)
DEPRECATED RDW RBC AUTO: 16.8 % (ref 12.4–15.4)
EOSINOPHIL # BLD: 0.2 K/UL (ref 0–0.6)
EOSINOPHIL NFR BLD: 1.4 %
GFR SERPLBLD CREATININE-BSD FMLA CKD-EPI: 56 ML/MIN/{1.73_M2}
GLUCOSE SERPL-MCNC: 107 MG/DL (ref 70–99)
HCT VFR BLD AUTO: 38.9 % (ref 36–48)
HGB BLD-MCNC: 12.5 G/DL (ref 12–16)
INR PPP: 0.97 (ref 0.84–1.16)
LYMPHOCYTES # BLD: 3.3 K/UL (ref 1–5.1)
LYMPHOCYTES NFR BLD: 22.5 %
MCH RBC QN AUTO: 30 PG (ref 26–34)
MCHC RBC AUTO-ENTMCNC: 32.2 G/DL (ref 31–36)
MCV RBC AUTO: 93 FL (ref 80–100)
MONOCYTES # BLD: 1.1 K/UL (ref 0–1.3)
MONOCYTES NFR BLD: 7.6 %
NEUTROPHILS # BLD: 9.9 K/UL (ref 1.7–7.7)
NEUTROPHILS NFR BLD: 67.6 %
PLATELET # BLD AUTO: 303 K/UL (ref 135–450)
PMV BLD AUTO: 8.4 FL (ref 5–10.5)
POTASSIUM SERPL-SCNC: 4.2 MMOL/L (ref 3.5–5.1)
PROT SERPL-MCNC: 6.8 G/DL (ref 6.4–8.2)
PROTHROMBIN TIME: 12.9 SEC (ref 11.5–14.8)
RBC # BLD AUTO: 4.18 M/UL (ref 4–5.2)
SODIUM SERPL-SCNC: 142 MMOL/L (ref 136–145)
TROPONIN, HIGH SENSITIVITY: 14 NG/L (ref 0–14)
WBC # BLD AUTO: 14.6 K/UL (ref 4–11)

## 2023-06-24 PROCEDURE — 70450 CT HEAD/BRAIN W/O DYE: CPT

## 2023-06-24 PROCEDURE — 93005 ELECTROCARDIOGRAM TRACING: CPT | Performed by: PHYSICIAN ASSISTANT

## 2023-06-24 PROCEDURE — 80053 COMPREHEN METABOLIC PANEL: CPT

## 2023-06-24 PROCEDURE — 85610 PROTHROMBIN TIME: CPT

## 2023-06-24 PROCEDURE — 85730 THROMBOPLASTIN TIME PARTIAL: CPT

## 2023-06-24 PROCEDURE — 99285 EMERGENCY DEPT VISIT HI MDM: CPT

## 2023-06-24 PROCEDURE — 6370000000 HC RX 637 (ALT 250 FOR IP): Performed by: PHYSICIAN ASSISTANT

## 2023-06-24 PROCEDURE — 85025 COMPLETE CBC W/AUTO DIFF WBC: CPT

## 2023-06-24 PROCEDURE — 72125 CT NECK SPINE W/O DYE: CPT

## 2023-06-24 PROCEDURE — 71045 X-RAY EXAM CHEST 1 VIEW: CPT

## 2023-06-24 PROCEDURE — 84484 ASSAY OF TROPONIN QUANT: CPT

## 2023-06-24 RX ORDER — ACETAMINOPHEN 500 MG
1000 TABLET ORAL ONCE
Status: COMPLETED | OUTPATIENT
Start: 2023-06-24 | End: 2023-06-24

## 2023-06-24 RX ORDER — ONDANSETRON 2 MG/ML
4 INJECTION INTRAMUSCULAR; INTRAVENOUS EVERY 6 HOURS PRN
Status: DISCONTINUED | OUTPATIENT
Start: 2023-06-24 | End: 2023-06-25 | Stop reason: HOSPADM

## 2023-06-24 RX ADMIN — ACETAMINOPHEN 1000 MG: 500 TABLET ORAL at 23:01

## 2023-06-24 ASSESSMENT — PAIN DESCRIPTION - LOCATION: LOCATION: HEAD

## 2023-06-24 ASSESSMENT — PAIN SCALES - GENERAL
PAINLEVEL_OUTOF10: 5
PAINLEVEL_OUTOF10: 5

## 2023-06-24 ASSESSMENT — PAIN DESCRIPTION - ONSET: ONSET: SUDDEN

## 2023-06-24 ASSESSMENT — PAIN DESCRIPTION - DESCRIPTORS: DESCRIPTORS: ACHING

## 2023-06-24 ASSESSMENT — PAIN DESCRIPTION - FREQUENCY: FREQUENCY: CONTINUOUS

## 2023-06-24 ASSESSMENT — LIFESTYLE VARIABLES
HOW MANY STANDARD DRINKS CONTAINING ALCOHOL DO YOU HAVE ON A TYPICAL DAY: PATIENT DOES NOT DRINK
HOW OFTEN DO YOU HAVE A DRINK CONTAINING ALCOHOL: NEVER

## 2023-06-24 ASSESSMENT — PAIN DESCRIPTION - PAIN TYPE: TYPE: ACUTE PAIN

## 2023-06-25 VITALS
SYSTOLIC BLOOD PRESSURE: 189 MMHG | TEMPERATURE: 97.9 F | HEIGHT: 60 IN | HEART RATE: 73 BPM | BODY MASS INDEX: 44.17 KG/M2 | DIASTOLIC BLOOD PRESSURE: 62 MMHG | OXYGEN SATURATION: 100 % | RESPIRATION RATE: 12 BRPM | WEIGHT: 225 LBS

## 2023-06-25 LAB
EKG ATRIAL RATE: 72 BPM
EKG DIAGNOSIS: NORMAL
EKG P AXIS: 48 DEGREES
EKG P-R INTERVAL: 158 MS
EKG Q-T INTERVAL: 414 MS
EKG QRS DURATION: 102 MS
EKG QTC CALCULATION (BAZETT): 453 MS
EKG R AXIS: -37 DEGREES
EKG T AXIS: 88 DEGREES
EKG VENTRICULAR RATE: 72 BPM

## 2023-06-25 PROCEDURE — 93010 ELECTROCARDIOGRAM REPORT: CPT | Performed by: INTERNAL MEDICINE

## 2023-06-25 ASSESSMENT — ENCOUNTER SYMPTOMS
NAUSEA: 1
VOMITING: 0
ABDOMINAL PAIN: 0
SHORTNESS OF BREATH: 0
COUGH: 0
RHINORRHEA: 0
DIARRHEA: 0

## 2023-06-25 ASSESSMENT — PAIN - FUNCTIONAL ASSESSMENT: PAIN_FUNCTIONAL_ASSESSMENT: NONE - DENIES PAIN

## 2023-06-26 DIAGNOSIS — F33.41 RECURRENT MAJOR DEPRESSIVE DISORDER, IN PARTIAL REMISSION (HCC): ICD-10-CM

## 2023-06-26 RX ORDER — DULOXETIN HYDROCHLORIDE 30 MG/1
CAPSULE, DELAYED RELEASE ORAL
Qty: 90 CAPSULE | Refills: 1 | Status: SHIPPED | OUTPATIENT
Start: 2023-06-26

## 2023-06-27 ENCOUNTER — CLINICAL DOCUMENTATION ONLY (OUTPATIENT)
Facility: CLINIC | Age: 83
End: 2023-06-27

## 2023-06-29 ENCOUNTER — OFFICE VISIT (OUTPATIENT)
Dept: INTERNAL MEDICINE CLINIC | Age: 83
End: 2023-06-29

## 2023-06-29 VITALS
HEIGHT: 60 IN | HEART RATE: 64 BPM | WEIGHT: 211 LBS | BODY MASS INDEX: 41.43 KG/M2 | DIASTOLIC BLOOD PRESSURE: 86 MMHG | SYSTOLIC BLOOD PRESSURE: 136 MMHG | OXYGEN SATURATION: 95 %

## 2023-06-29 DIAGNOSIS — R29.6 FREQUENT FALLS: Primary | ICD-10-CM

## 2023-06-29 DIAGNOSIS — E66.01 OBESITY, CLASS III, BMI 40-49.9 (MORBID OBESITY) (HCC): ICD-10-CM

## 2023-06-29 DIAGNOSIS — E78.00 HYPERCHOLESTEREMIA: ICD-10-CM

## 2023-06-29 DIAGNOSIS — I25.119 ATHEROSCLEROSIS OF NATIVE CORONARY ARTERY OF NATIVE HEART WITH ANGINA PECTORIS (HCC): ICD-10-CM

## 2023-06-29 DIAGNOSIS — R54 SENILE DEBILITY: ICD-10-CM

## 2023-06-29 DIAGNOSIS — T75.3XXD MOTION SICKNESS, SUBSEQUENT ENCOUNTER: ICD-10-CM

## 2023-06-29 PROBLEM — I20.9 ANGINA PECTORIS, UNSPECIFIED (HCC): Status: ACTIVE | Noted: 2023-06-29

## 2023-06-29 RX ORDER — MECLIZINE HCL 12.5 MG/1
12.5-25 TABLET ORAL DAILY PRN
Qty: 30 TABLET | Refills: 5 | Status: SHIPPED | OUTPATIENT
Start: 2023-06-29

## 2023-06-29 RX ORDER — PITAVASTATIN CALCIUM 4.18 MG/1
TABLET, FILM COATED ORAL
Qty: 90 TABLET | Refills: 3 | Status: SHIPPED | OUTPATIENT
Start: 2023-06-29

## 2023-07-01 PROBLEM — R29.6 FREQUENT FALLS: Status: ACTIVE | Noted: 2023-07-01

## 2023-07-01 PROBLEM — R54 SENILE DEBILITY: Status: ACTIVE | Noted: 2023-07-01

## 2023-07-06 DIAGNOSIS — R11.0 NAUSEA: ICD-10-CM

## 2023-07-07 RX ORDER — PROMETHAZINE HYDROCHLORIDE 25 MG/1
TABLET ORAL
Qty: 20 TABLET | Refills: 1 | Status: SHIPPED | OUTPATIENT
Start: 2023-07-07

## 2023-07-07 RX ORDER — DULOXETIN HYDROCHLORIDE 60 MG/1
CAPSULE, DELAYED RELEASE ORAL
Qty: 90 CAPSULE | Refills: 1 | Status: SHIPPED | OUTPATIENT
Start: 2023-07-07

## 2023-07-10 RX ORDER — METOPROLOL TARTRATE 50 MG/1
TABLET, FILM COATED ORAL
Qty: 180 TABLET | Refills: 3 | Status: SHIPPED | OUTPATIENT
Start: 2023-07-10

## 2023-07-21 DIAGNOSIS — I10 ESSENTIAL HYPERTENSION: ICD-10-CM

## 2023-07-21 DIAGNOSIS — E79.0 HYPERURICEMIA: ICD-10-CM

## 2023-07-21 RX ORDER — VALSARTAN 320 MG/1
320 TABLET ORAL DAILY
Qty: 90 TABLET | Refills: 1 | Status: SHIPPED | OUTPATIENT
Start: 2023-07-21

## 2023-07-21 RX ORDER — ALLOPURINOL 300 MG/1
TABLET ORAL
Qty: 90 TABLET | Refills: 1 | Status: SHIPPED | OUTPATIENT
Start: 2023-07-21

## 2023-09-08 ENCOUNTER — TELEPHONE (OUTPATIENT)
Dept: INTERNAL MEDICINE CLINIC | Age: 83
End: 2023-09-08

## 2023-09-29 ENCOUNTER — OFFICE VISIT (OUTPATIENT)
Dept: INTERNAL MEDICINE CLINIC | Age: 83
End: 2023-09-29

## 2023-09-29 VITALS
DIASTOLIC BLOOD PRESSURE: 82 MMHG | HEIGHT: 60 IN | WEIGHT: 211 LBS | SYSTOLIC BLOOD PRESSURE: 136 MMHG | HEART RATE: 70 BPM | BODY MASS INDEX: 41.43 KG/M2 | OXYGEN SATURATION: 95 %

## 2023-09-29 DIAGNOSIS — Z23 NEED FOR INFLUENZA VACCINATION: ICD-10-CM

## 2023-09-29 DIAGNOSIS — Z86.711 HISTORY OF PULMONARY EMBOLISM: ICD-10-CM

## 2023-09-29 DIAGNOSIS — E78.00 HYPERCHOLESTEREMIA: ICD-10-CM

## 2023-09-29 DIAGNOSIS — N18.32 TYPE 2 DIABETES MELLITUS WITH STAGE 3B CHRONIC KIDNEY DISEASE, WITHOUT LONG-TERM CURRENT USE OF INSULIN (HCC): ICD-10-CM

## 2023-09-29 DIAGNOSIS — B35.1 ONYCHOMYCOSIS: Primary | ICD-10-CM

## 2023-09-29 DIAGNOSIS — J44.9 COPD, MILD (HCC): ICD-10-CM

## 2023-09-29 DIAGNOSIS — R35.1 NOCTURIA: ICD-10-CM

## 2023-09-29 DIAGNOSIS — E11.22 TYPE 2 DIABETES MELLITUS WITH STAGE 3B CHRONIC KIDNEY DISEASE, WITHOUT LONG-TERM CURRENT USE OF INSULIN (HCC): ICD-10-CM

## 2023-09-29 DIAGNOSIS — N18.32 STAGE 3B CHRONIC KIDNEY DISEASE (HCC): ICD-10-CM

## 2023-09-29 LAB
BILIRUBIN, POC: NORMAL
BLOOD URINE, POC: NORMAL
CLARITY, POC: NORMAL
COLOR, POC: NORMAL
GLUCOSE URINE, POC: NORMAL
KETONES, POC: NORMAL
LEUKOCYTE EST, POC: NORMAL
NITRITE, POC: NORMAL
PH, POC: 6
PROTEIN, POC: NORMAL
SPECIFIC GRAVITY, POC: 1.02
UROBILINOGEN, POC: 0.2

## 2023-09-29 RX ORDER — APIXABAN 2.5 MG/1
2.5 TABLET, FILM COATED ORAL 2 TIMES DAILY
Qty: 180 TABLET | Refills: 2 | Status: SHIPPED | OUTPATIENT
Start: 2023-09-29

## 2023-09-29 RX ORDER — TERBINAFINE HYDROCHLORIDE 250 MG/1
250 TABLET ORAL DAILY
Qty: 30 TABLET | Refills: 2 | Status: SHIPPED | OUTPATIENT
Start: 2023-09-29 | End: 2023-12-28

## 2023-09-29 NOTE — PATIENT INSTRUCTIONS
Avoid soda pop and artificial sweeteners for bladder issues. Consider putting some underpants with elastic legs and elastic waist over the pull ups or adult diapers.

## 2023-09-29 NOTE — PROGRESS NOTES
HERE WITH DAUGHTER EDWIN      2023    Doyle Morales (:  1940) is a 80 y.o. female, here for evaluation of the following chief complaint(s):  3 Month Follow-Up (Would like a script for a pure wiraymundo)        ASSESSMENT/PLAN:    1. Onychomycosis  Discussed use. Offered to do referral to podiatrist for nail care but daughter Margo Berger says she can handle it for her mother for now. - terbinafine (LAMISIL) 250 MG tablet; Take 1 tablet by mouth daily  Dispense: 30 tablet; Refill: 2    2. History of pulmonary embolism  On chronic AC. Has been getting Rx from hematologist but would like to get 90 days   - ELIQUIS 2.5 MG TABS tablet; Take 1 tablet by mouth 2 times daily  Dispense: 180 tablet; Refill: 2    3. Stage 3a chronic kidney disease (HCC)  Monitor. Estimated Creatinine Clearance: 45 mL/min (based on SCr of 1 mg/dL). To est clearance of 56.    - Comprehensive Metabolic Panel; Future    4. Nocturia  Check for UTI. Discussed ways of getting incontinence wear to be tighter against body. - POCT Urinalysis no Micro    5. Need for influenza vaccination  - Influenza, FLUAD, (age 72 y+), IM, Preservative Free, 0.5 mL    6. COPD, mild ([address])  Continues to smoke. Did not want counseling of med.    - CBC with Auto Differential; Future    7. Type 2 diabetes mellitus with stage 3b chronic kidney disease, without long-term current use of insulin ([address])  Lab Results   Component Value Date    LABA1C 5.8 2023      At goal and meeting medical guidelines. - Hemoglobin A1C; Future  - Lipid, Fasting; Future    8. Hypercholesteremia  Lab Results   Component Value Date    LDLCALC 95 2023    LDLDIRECT 89 2022     LDL above goal for CAD with goal < 70 but she is on the highest dose of pitavastatin. Continue med. - Lipid, Fasting; Future           FOLLOW UP:  3-4 months    HPI    Routine follow up. She would like something for athlete's foot and to do something about her toenail.     Has some right

## 2023-10-01 PROBLEM — B35.1 ONYCHOMYCOSIS: Status: ACTIVE | Noted: 2023-10-01

## 2023-11-17 DIAGNOSIS — B35.1 ONYCHOMYCOSIS: ICD-10-CM

## 2023-11-17 RX ORDER — TERBINAFINE HYDROCHLORIDE 250 MG/1
250 TABLET ORAL DAILY
Qty: 30 TABLET | Refills: 1 | Status: SHIPPED | OUTPATIENT
Start: 2023-11-17 | End: 2024-01-16

## 2024-01-02 DIAGNOSIS — E78.00 HYPERCHOLESTEREMIA: ICD-10-CM

## 2024-01-02 DIAGNOSIS — N18.32 STAGE 3B CHRONIC KIDNEY DISEASE (HCC): ICD-10-CM

## 2024-01-02 DIAGNOSIS — N18.32 TYPE 2 DIABETES MELLITUS WITH STAGE 3B CHRONIC KIDNEY DISEASE, WITHOUT LONG-TERM CURRENT USE OF INSULIN (HCC): ICD-10-CM

## 2024-01-02 DIAGNOSIS — J44.9 COPD, MILD (HCC): ICD-10-CM

## 2024-01-02 DIAGNOSIS — E11.22 TYPE 2 DIABETES MELLITUS WITH STAGE 3B CHRONIC KIDNEY DISEASE, WITHOUT LONG-TERM CURRENT USE OF INSULIN (HCC): ICD-10-CM

## 2024-01-02 LAB
ALBUMIN SERPL-MCNC: 4.5 G/DL (ref 3.4–5)
ALBUMIN/GLOB SERPL: 2 {RATIO} (ref 1.1–2.2)
ALP SERPL-CCNC: 146 U/L (ref 40–129)
ALT SERPL-CCNC: 11 U/L (ref 10–40)
ANION GAP SERPL CALCULATED.3IONS-SCNC: 13 MMOL/L (ref 3–16)
AST SERPL-CCNC: 15 U/L (ref 15–37)
BASOPHILS # BLD: 0.1 K/UL (ref 0–0.2)
BASOPHILS NFR BLD: 1.2 %
BILIRUB SERPL-MCNC: 0.4 MG/DL (ref 0–1)
BUN SERPL-MCNC: 27 MG/DL (ref 7–20)
CALCIUM SERPL-MCNC: 10.3 MG/DL (ref 8.3–10.6)
CHLORIDE SERPL-SCNC: 102 MMOL/L (ref 99–110)
CHOLEST SERPL-MCNC: 185 MG/DL (ref 0–199)
CO2 SERPL-SCNC: 27 MMOL/L (ref 21–32)
CREAT SERPL-MCNC: 1.5 MG/DL (ref 0.6–1.2)
DEPRECATED RDW RBC AUTO: 16.6 % (ref 12.4–15.4)
EOSINOPHIL # BLD: 0.2 K/UL (ref 0–0.6)
EOSINOPHIL NFR BLD: 1.4 %
GFR SERPLBLD CREATININE-BSD FMLA CKD-EPI: 34 ML/MIN/{1.73_M2}
GLUCOSE SERPL-MCNC: 105 MG/DL (ref 70–99)
HCT VFR BLD AUTO: 40.4 % (ref 36–48)
HDLC SERPL-MCNC: 47 MG/DL (ref 40–60)
HGB BLD-MCNC: 13.4 G/DL (ref 12–16)
LDL CHOLESTEROL CALCULATED: 104 MG/DL
LYMPHOCYTES # BLD: 2.7 K/UL (ref 1–5.1)
LYMPHOCYTES NFR BLD: 22.5 %
MCH RBC QN AUTO: 29.7 PG (ref 26–34)
MCHC RBC AUTO-ENTMCNC: 33.1 G/DL (ref 31–36)
MCV RBC AUTO: 89.6 FL (ref 80–100)
MONOCYTES # BLD: 0.9 K/UL (ref 0–1.3)
MONOCYTES NFR BLD: 7.2 %
NEUTROPHILS # BLD: 8 K/UL (ref 1.7–7.7)
NEUTROPHILS NFR BLD: 67.7 %
PLATELET # BLD AUTO: 313 K/UL (ref 135–450)
PMV BLD AUTO: 8.6 FL (ref 5–10.5)
POTASSIUM SERPL-SCNC: 3.7 MMOL/L (ref 3.5–5.1)
PROT SERPL-MCNC: 6.7 G/DL (ref 6.4–8.2)
RBC # BLD AUTO: 4.51 M/UL (ref 4–5.2)
SODIUM SERPL-SCNC: 142 MMOL/L (ref 136–145)
TRIGL SERPL-MCNC: 172 MG/DL (ref 0–150)
VLDLC SERPL CALC-MCNC: 34 MG/DL
WBC # BLD AUTO: 11.9 K/UL (ref 4–11)

## 2024-01-03 LAB
EST. AVERAGE GLUCOSE BLD GHB EST-MCNC: 119.8 MG/DL
HBA1C MFR BLD: 5.8 %

## 2024-01-04 ENCOUNTER — OFFICE VISIT (OUTPATIENT)
Dept: INTERNAL MEDICINE CLINIC | Age: 84
End: 2024-01-04

## 2024-01-04 VITALS
OXYGEN SATURATION: 97 % | SYSTOLIC BLOOD PRESSURE: 126 MMHG | HEART RATE: 57 BPM | WEIGHT: 206.2 LBS | DIASTOLIC BLOOD PRESSURE: 80 MMHG | HEIGHT: 60 IN | BODY MASS INDEX: 40.48 KG/M2

## 2024-01-04 DIAGNOSIS — M25.561 CHRONIC PAIN OF BOTH KNEES: Primary | ICD-10-CM

## 2024-01-04 DIAGNOSIS — E70.30 ALBINOIDISM (HCC): ICD-10-CM

## 2024-01-04 DIAGNOSIS — M25.562 CHRONIC PAIN OF BOTH KNEES: Primary | ICD-10-CM

## 2024-01-04 DIAGNOSIS — G89.29 CHRONIC PAIN OF BOTH KNEES: Primary | ICD-10-CM

## 2024-01-04 DIAGNOSIS — E78.00 HYPERCHOLESTEREMIA: ICD-10-CM

## 2024-01-04 DIAGNOSIS — F33.42 RECURRENT MAJOR DEPRESSIVE DISORDER, IN FULL REMISSION (HCC): ICD-10-CM

## 2024-01-04 DIAGNOSIS — J44.9 COPD, MILD (HCC): ICD-10-CM

## 2024-01-04 DIAGNOSIS — E11.22 TYPE 2 DIABETES MELLITUS WITH STAGE 3B CHRONIC KIDNEY DISEASE, WITHOUT LONG-TERM CURRENT USE OF INSULIN (HCC): ICD-10-CM

## 2024-01-04 DIAGNOSIS — I25.119 ATHEROSCLEROSIS OF NATIVE CORONARY ARTERY OF NATIVE HEART WITH ANGINA PECTORIS (HCC): ICD-10-CM

## 2024-01-04 DIAGNOSIS — E66.01 OBESITY, CLASS III, BMI 40-49.9 (MORBID OBESITY) (HCC): ICD-10-CM

## 2024-01-04 DIAGNOSIS — N18.32 STAGE 3B CHRONIC KIDNEY DISEASE (HCC): ICD-10-CM

## 2024-01-04 DIAGNOSIS — N18.32 TYPE 2 DIABETES MELLITUS WITH STAGE 3B CHRONIC KIDNEY DISEASE, WITHOUT LONG-TERM CURRENT USE OF INSULIN (HCC): ICD-10-CM

## 2024-01-04 LAB
BASOPHILS # BLD: 0.1 K/UL (ref 0–0.2)
BASOPHILS NFR BLD: 0.8 %
DEPRECATED RDW RBC AUTO: 17.1 % (ref 12.4–15.4)
EOSINOPHIL # BLD: 0.3 K/UL (ref 0–0.6)
EOSINOPHIL NFR BLD: 2.1 %
HCT VFR BLD AUTO: 42.5 % (ref 36–48)
HGB BLD-MCNC: 13.6 G/DL (ref 12–16)
LYMPHOCYTES # BLD: 3.1 K/UL (ref 1–5.1)
LYMPHOCYTES NFR BLD: 25.1 %
MCH RBC QN AUTO: 29 PG (ref 26–34)
MCHC RBC AUTO-ENTMCNC: 32 G/DL (ref 31–36)
MCV RBC AUTO: 90.7 FL (ref 80–100)
MONOCYTES # BLD: 0.9 K/UL (ref 0–1.3)
MONOCYTES NFR BLD: 7 %
NEUTROPHILS # BLD: 8.1 K/UL (ref 1.7–7.7)
NEUTROPHILS NFR BLD: 65 %
PLATELET # BLD AUTO: 369 K/UL (ref 135–450)
PMV BLD AUTO: 8.7 FL (ref 5–10.5)
RBC # BLD AUTO: 4.69 M/UL (ref 4–5.2)
WBC # BLD AUTO: 12.5 K/UL (ref 4–11)

## 2024-01-04 RX ORDER — PITAVASTATIN 4 MG/1
TABLET, FILM COATED ORAL
Qty: 90 TABLET | Refills: 3 | Status: SHIPPED | OUTPATIENT
Start: 2024-01-04

## 2024-01-04 NOTE — RESULT ENCOUNTER NOTE
Discussed at appointment.  Repeating renal function.  Discussed LDL is not at goal. Other labs stable and OK.

## 2024-01-04 NOTE — PATIENT INSTRUCTIONS
The brain fogginess and zings are likely due to missing the duloxetine and also due to missing your BP medications.      I am going  to lower the dose of your metoprolol pill.  It will be 25 mg twice a day.  If you have pills left, you can take 1/2 pill twice a day.  1 pill once a day does not work the same.

## 2024-01-05 LAB
ANION GAP SERPL CALCULATED.3IONS-SCNC: 9 MMOL/L (ref 3–16)
BUN SERPL-MCNC: 25 MG/DL (ref 7–20)
CALCIUM SERPL-MCNC: 10.4 MG/DL (ref 8.3–10.6)
CHLORIDE SERPL-SCNC: 104 MMOL/L (ref 99–110)
CO2 SERPL-SCNC: 29 MMOL/L (ref 21–32)
CREAT SERPL-MCNC: 1.2 MG/DL (ref 0.6–1.2)
GFR SERPLBLD CREATININE-BSD FMLA CKD-EPI: 45 ML/MIN/{1.73_M2}
GLUCOSE SERPL-MCNC: 108 MG/DL (ref 70–99)
POTASSIUM SERPL-SCNC: 4.3 MMOL/L (ref 3.5–5.1)
SODIUM SERPL-SCNC: 142 MMOL/L (ref 136–145)
URATE SERPL-MCNC: 5 MG/DL (ref 2.6–6)

## 2024-01-06 NOTE — RESULT ENCOUNTER NOTE
Some improvement in kidney function but still not quite normal.  I am glad it did not worsen.  Make sure to drink plenty of water.  Other labs are stable and normal for you.

## 2024-01-08 RX ORDER — DULOXETIN HYDROCHLORIDE 60 MG/1
CAPSULE, DELAYED RELEASE ORAL
Qty: 90 CAPSULE | Refills: 1 | Status: SHIPPED | OUTPATIENT
Start: 2024-01-08

## 2024-01-30 DIAGNOSIS — I10 ESSENTIAL HYPERTENSION: ICD-10-CM

## 2024-01-30 RX ORDER — PANTOPRAZOLE SODIUM 40 MG/1
TABLET, DELAYED RELEASE ORAL
Qty: 90 TABLET | Refills: 2 | Status: SHIPPED | OUTPATIENT
Start: 2024-01-30

## 2024-01-30 RX ORDER — VALSARTAN 320 MG/1
320 TABLET ORAL DAILY
Qty: 90 TABLET | Refills: 1 | Status: SHIPPED | OUTPATIENT
Start: 2024-01-30

## 2024-02-26 DIAGNOSIS — L20.84 INTRINSIC ECZEMA: ICD-10-CM

## 2024-02-26 DIAGNOSIS — E79.0 HYPERURICEMIA: ICD-10-CM

## 2024-02-27 RX ORDER — AMMONIUM LACTATE 12 G/100G
CREAM TOPICAL
Qty: 840 G | Refills: 1 | Status: SHIPPED | OUTPATIENT
Start: 2024-02-27

## 2024-02-27 RX ORDER — ALLOPURINOL 300 MG/1
300 TABLET ORAL DAILY
Qty: 90 TABLET | Refills: 1 | Status: SHIPPED | OUTPATIENT
Start: 2024-02-27

## 2024-03-30 DIAGNOSIS — F33.41 RECURRENT MAJOR DEPRESSIVE DISORDER, IN PARTIAL REMISSION (HCC): ICD-10-CM

## 2024-04-01 RX ORDER — DULOXETIN HYDROCHLORIDE 30 MG/1
CAPSULE, DELAYED RELEASE ORAL
Qty: 90 CAPSULE | Refills: 1 | Status: SHIPPED | OUTPATIENT
Start: 2024-04-01

## 2024-04-01 NOTE — TELEPHONE ENCOUNTER
Last OV: 1/4/2024  Next OV: 4/5/2024    Next appointment due:04/04/24    Last fill:01/30/24  Refills:  1   #90

## 2024-04-05 ENCOUNTER — OFFICE VISIT (OUTPATIENT)
Dept: INTERNAL MEDICINE CLINIC | Age: 84
End: 2024-04-05

## 2024-04-05 VITALS
OXYGEN SATURATION: 96 % | SYSTOLIC BLOOD PRESSURE: 128 MMHG | DIASTOLIC BLOOD PRESSURE: 62 MMHG | BODY MASS INDEX: 40.04 KG/M2 | HEART RATE: 55 BPM | WEIGHT: 205 LBS

## 2024-04-05 DIAGNOSIS — E11.22 TYPE 2 DIABETES MELLITUS WITH STAGE 3B CHRONIC KIDNEY DISEASE, WITHOUT LONG-TERM CURRENT USE OF INSULIN (HCC): Primary | ICD-10-CM

## 2024-04-05 DIAGNOSIS — E66.01 CLASS 3 SEVERE OBESITY DUE TO EXCESS CALORIES WITH SERIOUS COMORBIDITY AND BODY MASS INDEX (BMI) OF 40.0 TO 44.9 IN ADULT (HCC): ICD-10-CM

## 2024-04-05 DIAGNOSIS — R42 VERTIGO: ICD-10-CM

## 2024-04-05 DIAGNOSIS — N18.32 TYPE 2 DIABETES MELLITUS WITH STAGE 3B CHRONIC KIDNEY DISEASE, WITHOUT LONG-TERM CURRENT USE OF INSULIN (HCC): Primary | ICD-10-CM

## 2024-04-05 DIAGNOSIS — T75.3XXD MOTION SICKNESS, SUBSEQUENT ENCOUNTER: ICD-10-CM

## 2024-04-05 DIAGNOSIS — J44.9 COPD, MILD (HCC): ICD-10-CM

## 2024-04-05 DIAGNOSIS — I25.10 ATHEROSCLEROSIS OF NATIVE CORONARY ARTERY OF NATIVE HEART WITHOUT ANGINA PECTORIS: ICD-10-CM

## 2024-04-05 RX ORDER — TRIAMCINOLONE ACETONIDE 1 MG/G
CREAM TOPICAL
Qty: 45 G | Refills: 1 | Status: SHIPPED | OUTPATIENT
Start: 2024-04-05

## 2024-04-05 RX ORDER — MECLIZINE HCL 12.5 MG/1
12.5-25 TABLET ORAL DAILY PRN
Qty: 30 TABLET | Refills: 5 | Status: SHIPPED | OUTPATIENT
Start: 2024-04-05

## 2024-04-05 SDOH — ECONOMIC STABILITY: FOOD INSECURITY: WITHIN THE PAST 12 MONTHS, THE FOOD YOU BOUGHT JUST DIDN'T LAST AND YOU DIDN'T HAVE MONEY TO GET MORE.: NEVER TRUE

## 2024-04-05 SDOH — ECONOMIC STABILITY: FOOD INSECURITY: WITHIN THE PAST 12 MONTHS, YOU WORRIED THAT YOUR FOOD WOULD RUN OUT BEFORE YOU GOT MONEY TO BUY MORE.: NEVER TRUE

## 2024-04-05 SDOH — ECONOMIC STABILITY: INCOME INSECURITY: HOW HARD IS IT FOR YOU TO PAY FOR THE VERY BASICS LIKE FOOD, HOUSING, MEDICAL CARE, AND HEATING?: NOT HARD AT ALL

## 2024-04-05 ASSESSMENT — PATIENT HEALTH QUESTIONNAIRE - PHQ9
5. POOR APPETITE OR OVEREATING: NOT AT ALL
7. TROUBLE CONCENTRATING ON THINGS, SUCH AS READING THE NEWSPAPER OR WATCHING TELEVISION: NOT AT ALL
SUM OF ALL RESPONSES TO PHQ9 QUESTIONS 1 & 2: 1
SUM OF ALL RESPONSES TO PHQ QUESTIONS 1-9: 5
3. TROUBLE FALLING OR STAYING ASLEEP: NOT AT ALL
2. FEELING DOWN, DEPRESSED OR HOPELESS: SEVERAL DAYS
SUM OF ALL RESPONSES TO PHQ QUESTIONS 1-9: 5
SUM OF ALL RESPONSES TO PHQ QUESTIONS 1-9: 5
8. MOVING OR SPEAKING SO SLOWLY THAT OTHER PEOPLE COULD HAVE NOTICED. OR THE OPPOSITE, BEING SO FIGETY OR RESTLESS THAT YOU HAVE BEEN MOVING AROUND A LOT MORE THAN USUAL: SEVERAL DAYS
1. LITTLE INTEREST OR PLEASURE IN DOING THINGS: NOT AT ALL
10. IF YOU CHECKED OFF ANY PROBLEMS, HOW DIFFICULT HAVE THESE PROBLEMS MADE IT FOR YOU TO DO YOUR WORK, TAKE CARE OF THINGS AT HOME, OR GET ALONG WITH OTHER PEOPLE: NOT DIFFICULT AT ALL
SUM OF ALL RESPONSES TO PHQ QUESTIONS 1-9: 5
4. FEELING TIRED OR HAVING LITTLE ENERGY: NEARLY EVERY DAY
6. FEELING BAD ABOUT YOURSELF - OR THAT YOU ARE A FAILURE OR HAVE LET YOURSELF OR YOUR FAMILY DOWN: NOT AT ALL
9. THOUGHTS THAT YOU WOULD BE BETTER OFF DEAD, OR OF HURTING YOURSELF: NOT AT ALL

## 2024-04-05 NOTE — PROGRESS NOTES
Here with daughter Lenora    2024    Shwetha Maradiaga (:  1940) is a 83 y.o. female, here for evaluation of the following chief complaint(s):  Follow-up (3 months), Dizziness, and Nausea        ASSESSMENT/PLAN:    1. Type 2 diabetes mellitus with stage 3b chronic kidney disease, without long-term current use of insulin (HCC)  Last A1C 5.8 = At goal and meeting medical guidelines. Continue weight loss and healthy diet.  Not currently on medication.     2. Motion sickness, subsequent encounter  - meclizine (ANTIVERT) 12.5 MG tablet; Take 1-2 tablets by mouth daily as needed for Dizziness  Dispense: 30 tablet; Refill: 5    3. COPD, mild ([facility])  Probably worse since last PFT .  She declines to have retested.      4. Class 3 severe obesity due to excess calories with serious comorbidity and body mass index (BMI) of 40.0 to 44.9 in adult ([facility])  She is stable.  Will try to work on eating less.    H/o 220-230 lbs so weight is trending down slowly.     5. CAD/Atherosclerosis of native coronary artery of native heart without angina pectoris ([facility])  H/o CABG in 2015.  Not very active and mostly sedentary.  No angina    6. Vertigo  Every since she had inner ear surgery. Using Meclizine for this also.          FOLLOW UP:  Return in about 3 months (around 2024) for medication check and lab.  fasting not needed..      HPI  Routine follow up    Was nauseated, so ate some crackers and feeling better.   Meclizine does help.  The nausea and dizziness is not new.    Does need her meclizine refilled.    Otherwise she is stable.  Down to 1/2 ppd on her smoking.  Not interested in quitting.  Reviewed smoking history in more detail to get a more accurate picture.    Daughter thinks she memory is starting to get a little worse.      See assessment above for other issues addressed at this visit.    Outpatient Medications Marked as Taking for the 24 encounter (Office Visit) with Kat Martinez MD   Medication Sig

## 2024-04-07 PROBLEM — I20.9 ANGINA PECTORIS, UNSPECIFIED (HCC): Status: RESOLVED | Noted: 2023-06-29 | Resolved: 2024-04-07

## 2024-04-07 PROBLEM — I25.119 ATHEROSCLEROSIS OF NATIVE CORONARY ARTERY OF NATIVE HEART WITH ANGINA PECTORIS (HCC): Status: RESOLVED | Noted: 2023-06-29 | Resolved: 2024-04-07

## 2024-05-10 LAB — DIABETIC RETINOPATHY: NEGATIVE

## 2024-06-14 ENCOUNTER — OFFICE VISIT (OUTPATIENT)
Dept: INTERNAL MEDICINE CLINIC | Age: 84
End: 2024-06-14

## 2024-06-14 VITALS
DIASTOLIC BLOOD PRESSURE: 70 MMHG | OXYGEN SATURATION: 98 % | WEIGHT: 201.6 LBS | BODY MASS INDEX: 39.58 KG/M2 | SYSTOLIC BLOOD PRESSURE: 126 MMHG | HEART RATE: 62 BPM | HEIGHT: 60 IN

## 2024-06-14 DIAGNOSIS — M85.88 OSTEOPENIA OF LUMBAR SPINE: ICD-10-CM

## 2024-06-14 DIAGNOSIS — R11.0 NAUSEA: ICD-10-CM

## 2024-06-14 DIAGNOSIS — N18.32 TYPE 2 DIABETES MELLITUS WITH STAGE 3B CHRONIC KIDNEY DISEASE, WITHOUT LONG-TERM CURRENT USE OF INSULIN (HCC): ICD-10-CM

## 2024-06-14 DIAGNOSIS — Z00.00 MEDICARE ANNUAL WELLNESS VISIT, SUBSEQUENT: Primary | ICD-10-CM

## 2024-06-14 DIAGNOSIS — Z79.899 CURRENT USE OF PROTON PUMP INHIBITOR: ICD-10-CM

## 2024-06-14 DIAGNOSIS — E79.0 HYPERURICEMIA: ICD-10-CM

## 2024-06-14 DIAGNOSIS — E61.1 IRON DEFICIENCY: ICD-10-CM

## 2024-06-14 DIAGNOSIS — E11.22 TYPE 2 DIABETES MELLITUS WITH STAGE 3B CHRONIC KIDNEY DISEASE, WITHOUT LONG-TERM CURRENT USE OF INSULIN (HCC): ICD-10-CM

## 2024-06-14 DIAGNOSIS — M25.511 ACUTE PAIN OF RIGHT SHOULDER: ICD-10-CM

## 2024-06-14 DIAGNOSIS — Z78.0 POSTMENOPAUSAL: ICD-10-CM

## 2024-06-14 DIAGNOSIS — H91.8X3 OTHER SPECIFIED HEARING LOSS OF BOTH EARS: ICD-10-CM

## 2024-06-14 LAB
ALBUMIN SERPL-MCNC: 4.4 G/DL (ref 3.4–5)
ALBUMIN/GLOB SERPL: 1.8 {RATIO} (ref 1.1–2.2)
ALP SERPL-CCNC: 150 U/L (ref 40–129)
ALT SERPL-CCNC: 9 U/L (ref 10–40)
ANION GAP SERPL CALCULATED.3IONS-SCNC: 15 MMOL/L (ref 3–16)
AST SERPL-CCNC: 14 U/L (ref 15–37)
BASOPHILS # BLD: 0.1 K/UL (ref 0–0.2)
BASOPHILS NFR BLD: 0.8 %
BILIRUB SERPL-MCNC: 0.5 MG/DL (ref 0–1)
BUN SERPL-MCNC: 31 MG/DL (ref 7–20)
CALCIUM SERPL-MCNC: 10.7 MG/DL (ref 8.3–10.6)
CHLORIDE SERPL-SCNC: 104 MMOL/L (ref 99–110)
CO2 SERPL-SCNC: 25 MMOL/L (ref 21–32)
CREAT SERPL-MCNC: 1.6 MG/DL (ref 0.6–1.2)
DEPRECATED RDW RBC AUTO: 17 % (ref 12.4–15.4)
EOSINOPHIL # BLD: 0.3 K/UL (ref 0–0.6)
EOSINOPHIL NFR BLD: 2.3 %
GFR SERPLBLD CREATININE-BSD FMLA CKD-EPI: 32 ML/MIN/{1.73_M2}
GLUCOSE SERPL-MCNC: 108 MG/DL (ref 70–99)
HCT VFR BLD AUTO: 39.8 % (ref 36–48)
HGB BLD-MCNC: 13.2 G/DL (ref 12–16)
IRON SATN MFR SERPL: 29 % (ref 15–50)
IRON SERPL-MCNC: 98 UG/DL (ref 37–145)
LYMPHOCYTES # BLD: 3 K/UL (ref 1–5.1)
LYMPHOCYTES NFR BLD: 24.2 %
MAGNESIUM SERPL-MCNC: 2.4 MG/DL (ref 1.8–2.4)
MCH RBC QN AUTO: 30.3 PG (ref 26–34)
MCHC RBC AUTO-ENTMCNC: 33.1 G/DL (ref 31–36)
MCV RBC AUTO: 91.6 FL (ref 80–100)
MONOCYTES # BLD: 0.9 K/UL (ref 0–1.3)
MONOCYTES NFR BLD: 7.1 %
NEUTROPHILS # BLD: 8.2 K/UL (ref 1.7–7.7)
NEUTROPHILS NFR BLD: 65.6 %
PLATELET # BLD AUTO: 319 K/UL (ref 135–450)
PMV BLD AUTO: 9.1 FL (ref 5–10.5)
POTASSIUM SERPL-SCNC: 4.3 MMOL/L (ref 3.5–5.1)
PROT SERPL-MCNC: 6.8 G/DL (ref 6.4–8.2)
RBC # BLD AUTO: 4.35 M/UL (ref 4–5.2)
SODIUM SERPL-SCNC: 144 MMOL/L (ref 136–145)
TIBC SERPL-MCNC: 336 UG/DL (ref 260–445)
URATE SERPL-MCNC: 4.5 MG/DL (ref 2.6–6)
WBC # BLD AUTO: 12.5 K/UL (ref 4–11)

## 2024-06-14 RX ORDER — PROMETHAZINE HYDROCHLORIDE 25 MG/1
TABLET ORAL
Qty: 20 TABLET | Refills: 2 | Status: SHIPPED | OUTPATIENT
Start: 2024-06-14

## 2024-06-14 ASSESSMENT — PATIENT HEALTH QUESTIONNAIRE - PHQ9
1. LITTLE INTEREST OR PLEASURE IN DOING THINGS: NOT AT ALL
SUM OF ALL RESPONSES TO PHQ QUESTIONS 1-9: 0
8. MOVING OR SPEAKING SO SLOWLY THAT OTHER PEOPLE COULD HAVE NOTICED. OR THE OPPOSITE, BEING SO FIGETY OR RESTLESS THAT YOU HAVE BEEN MOVING AROUND A LOT MORE THAN USUAL: NOT AT ALL
2. FEELING DOWN, DEPRESSED OR HOPELESS: NOT AT ALL
5. POOR APPETITE OR OVEREATING: NOT AT ALL
SUM OF ALL RESPONSES TO PHQ QUESTIONS 1-9: 0
10. IF YOU CHECKED OFF ANY PROBLEMS, HOW DIFFICULT HAVE THESE PROBLEMS MADE IT FOR YOU TO DO YOUR WORK, TAKE CARE OF THINGS AT HOME, OR GET ALONG WITH OTHER PEOPLE: NOT DIFFICULT AT ALL
4. FEELING TIRED OR HAVING LITTLE ENERGY: NOT AT ALL
6. FEELING BAD ABOUT YOURSELF - OR THAT YOU ARE A FAILURE OR HAVE LET YOURSELF OR YOUR FAMILY DOWN: NOT AT ALL
SUM OF ALL RESPONSES TO PHQ QUESTIONS 1-9: 0
9. THOUGHTS THAT YOU WOULD BE BETTER OFF DEAD, OR OF HURTING YOURSELF: NOT AT ALL
SUM OF ALL RESPONSES TO PHQ QUESTIONS 1-9: 0
7. TROUBLE CONCENTRATING ON THINGS, SUCH AS READING THE NEWSPAPER OR WATCHING TELEVISION: NOT AT ALL
3. TROUBLE FALLING OR STAYING ASLEEP: NOT AT ALL
SUM OF ALL RESPONSES TO PHQ9 QUESTIONS 1 & 2: 0

## 2024-06-14 NOTE — PROGRESS NOTES
Medicare Annual Wellness Visit    Shwetha Maradiaga is here for Medicare AWV    Assessment & Plan   1. Medicare annual wellness visit, subsequent    2. Postmenopausal  - DEXA BONE DENSITY AXIAL SKELETON    3. Osteopenia of lumbar spine  - DEXA BONE DENSITY AXIAL SKELETON    4. Nausea  - promethazine (PHENERGAN) 25 MG tablet; TAKE 1/2 TABLET BY MOUTH EVERY 6 HOURS AS NEEDED FOR NAUSEA  Dispense: 20 tablet; Refill: 2    5. Current use of proton pump inhibitor  - Magnesium  - Vitamin B12    6. Iron deficiency  - Iron and TIBC    7. Hyperuricemia  - Uric Acid    8. Type 2 diabetes mellitus with stage 3b chronic kidney disease, without long-term current use of insulin ([facility])  Hemoglobin A1C   Date Value Ref Range Status   01/02/2024 5.8 See comment % Final     Comment:     Comment:  Diagnosis of Diabetes: > or = 6.5%  Increased risk of diabetes (Prediabetes): 5.7-6.4%  Glycemic Control: Nonpregnant Adults: <7.0%                    Pregnant: <6.0%           - CBC with Auto Differential  - Comprehensive Metabolic Panel  - Iron and TIBC    9. Other specified hearing loss of both ears  Consider seeing ENT or audiologist.  Her hearing aids that are approximately 1 year since purchased do not seem to be helping    10. Acute pain of right shoulder  Home exercise program given.  See after visit summary.  Will send her to physical therapy if not improving with home exercises.  She did not want to do PT right now.        Recommendations for Preventive Services Due: see orders and patient instructions/AVS.  Recommended screening schedule for the next 5-10 years is provided to the patient in written form: see Patient Instructions/AVS.     Return in about 3 months (around 9/14/2024).       Subjective   The following acute and/or chronic problems were also addressed today:  Type 2 diabetes, hyperuricemia, history of iron deficiency, chronic use of PPI.    New problem: Right shoulder pain.  She had a very high bed/mattress.  She was climbing

## 2024-06-14 NOTE — PATIENT INSTRUCTIONS
GET SHINGLES VACCINE AT THE PHARMACY.  IT IS 2 DOSES.  THE 2ND DOSE IS GIVEN ANYWHERE BETWEEN 2 MONTHS AND 6 MONTHS AFTER THE FIRST MONTH.    ANOTHER GOOD VACCINE IS RSV.  THIS HELPS PROTECT AGAINST A PNEUMONIA/BRONCHITIS VIRUS.  IT IS ONLY 1 TIME OR 1 DOSE.    BOTH OF THESE ARE COVERED AT [facility].     Preventing Falls: Care Instructions  Injuries and health problems such as trouble walking or poor eyesight can increase your risk of falling. So can some medicines. But there are things you can do to help prevent falls. You can exercise to get stronger. You can also arrange your home to make it safer.    Talk to your doctor about the medicines you take. Ask if any of them increase the risk of falls and whether they can be changed or stopped.   Try to exercise regularly. It can help improve your strength and balance. This can help lower your risk of falling.         Practice fall safety and prevention.   Wear low-heeled shoes that fit well and give your feet good support. Talk to your doctor if you have foot problems that make this hard.  Carry a cellphone or wear a medical alert device that you can use to call for help.  Use stepladders instead of chairs to reach high objects. Don't climb if you're at risk for falls. Ask for help, if needed.  Wear the correct eyeglasses, if you need them.        Make your home safer.   Remove rugs, cords, clutter, and furniture from walkways.  Keep your house well lit. Use night-lights in hallways and bathrooms.  Install and use sturdy handrails on stairways.  Wear nonskid footwear, even inside. Don't walk barefoot or in socks without shoes.        Be safe outside.   Use handrails, curb cuts, and ramps whenever possible.  Keep your hands free by using a shoulder bag or backpack.  Try to walk in well-lit areas. Watch out for uneven ground, changes in pavement, and debris.  Be careful in the winter. Walk on the grass or gravel when sidewalks are slippery. Use de-icer on steps and

## 2024-06-15 LAB — VIT B12 SERPL-MCNC: >2000 PG/ML (ref 211–911)

## 2024-06-15 NOTE — RESULT ENCOUNTER NOTE
Call her emergency contact if she cannot hear on the phone.  Offered to mail results to her also.  Tests are overall normal or stable except her kidney function is a little lower than the past couple years.  Due to this kidney test, she needs to avoid daily use of Advil or Aleve or their generics.  Her kidneys and other blood test would benefit by drinking more water or even any other nonsoda pop beverage.  Iron level is good and not anemic.  We will check the kidney function test again at your appointment in September.  You do not need to fast.  Drink plenty of liquid that day.

## 2024-07-17 RX ORDER — DULOXETIN HYDROCHLORIDE 60 MG/1
CAPSULE, DELAYED RELEASE ORAL
Qty: 90 CAPSULE | Refills: 1 | Status: SHIPPED | OUTPATIENT
Start: 2024-07-17

## 2024-08-14 ENCOUNTER — OFFICE VISIT (OUTPATIENT)
Dept: INTERNAL MEDICINE CLINIC | Age: 84
End: 2024-08-14

## 2024-08-14 VITALS
HEIGHT: 60 IN | SYSTOLIC BLOOD PRESSURE: 134 MMHG | BODY MASS INDEX: 39.07 KG/M2 | DIASTOLIC BLOOD PRESSURE: 82 MMHG | OXYGEN SATURATION: 97 % | HEART RATE: 66 BPM | WEIGHT: 199 LBS

## 2024-08-14 DIAGNOSIS — R10.9 FLANK PAIN: Primary | ICD-10-CM

## 2024-08-14 DIAGNOSIS — R79.89 ELEVATED SERUM CREATININE: ICD-10-CM

## 2024-08-14 DIAGNOSIS — R10.9 FLANK PAIN: ICD-10-CM

## 2024-08-14 LAB
ANION GAP SERPL CALCULATED.3IONS-SCNC: 14 MMOL/L (ref 3–16)
BASOPHILS # BLD: 0.1 K/UL (ref 0–0.2)
BASOPHILS NFR BLD: 0.8 %
BILIRUBIN, POC: ABNORMAL
BLOOD URINE, POC: ABNORMAL
BUN SERPL-MCNC: 20 MG/DL (ref 7–20)
CALCIUM SERPL-MCNC: 10 MG/DL (ref 8.3–10.6)
CHLORIDE SERPL-SCNC: 105 MMOL/L (ref 99–110)
CLARITY, POC: CLEAR
CO2 SERPL-SCNC: 24 MMOL/L (ref 21–32)
COLOR, POC: ABNORMAL
CREAT SERPL-MCNC: 1.3 MG/DL (ref 0.6–1.2)
DEPRECATED RDW RBC AUTO: 16.9 % (ref 12.4–15.4)
EOSINOPHIL # BLD: 0.3 K/UL (ref 0–0.6)
EOSINOPHIL NFR BLD: 2.8 %
GFR SERPLBLD CREATININE-BSD FMLA CKD-EPI: 41 ML/MIN/{1.73_M2}
GLUCOSE SERPL-MCNC: 98 MG/DL (ref 70–99)
GLUCOSE URINE, POC: ABNORMAL
HCT VFR BLD AUTO: 39.7 % (ref 36–48)
HGB BLD-MCNC: 13.2 G/DL (ref 12–16)
KETONES, POC: ABNORMAL
LEUKOCYTE EST, POC: ABNORMAL
LYMPHOCYTES # BLD: 2.5 K/UL (ref 1–5.1)
LYMPHOCYTES NFR BLD: 23.6 %
MCH RBC QN AUTO: 30.6 PG (ref 26–34)
MCHC RBC AUTO-ENTMCNC: 33.3 G/DL (ref 31–36)
MCV RBC AUTO: 91.8 FL (ref 80–100)
MONOCYTES # BLD: 0.8 K/UL (ref 0–1.3)
MONOCYTES NFR BLD: 7.5 %
NEUTROPHILS # BLD: 6.8 K/UL (ref 1.7–7.7)
NEUTROPHILS NFR BLD: 65.3 %
NITRITE, POC: ABNORMAL
PH, POC: 6
PLATELET # BLD AUTO: 311 K/UL (ref 135–450)
PMV BLD AUTO: 8.8 FL (ref 5–10.5)
POTASSIUM SERPL-SCNC: 3.8 MMOL/L (ref 3.5–5.1)
PROTEIN, POC: ABNORMAL
RBC # BLD AUTO: 4.32 M/UL (ref 4–5.2)
SODIUM SERPL-SCNC: 143 MMOL/L (ref 136–145)
SPECIFIC GRAVITY, POC: 1.02
UROBILINOGEN, POC: 0.2
WBC # BLD AUTO: 10.4 K/UL (ref 4–11)

## 2024-08-14 RX ORDER — LIDOCAINE 4 G/G
1 PATCH TOPICAL DAILY
COMMUNITY
Start: 2024-08-14 | End: 2024-09-13

## 2024-08-14 RX ORDER — CIPROFLOXACIN 250 MG/1
250 TABLET, FILM COATED ORAL 2 TIMES DAILY
Qty: 6 TABLET | Refills: 0 | Status: SHIPPED | OUTPATIENT
Start: 2024-08-14 | End: 2024-08-17

## 2024-08-14 NOTE — PROGRESS NOTES
breath sounds. No rales.   Abdominal:      General: Bowel sounds are normal. There is no distension or abdominal bruit.      Palpations: Abdomen is soft. There is no mass.      Tenderness: There is no abdominal tenderness. There is right CVA tenderness. There is no left CVA tenderness, guarding or rebound.      Hernia: No hernia is present.      Comments: Right CVA tenderness vs right lumbar muscle tenderness.   Skin:     General: Skin is warm and dry.      Coloration: Skin is not pale.      Findings: No rash.   Psychiatric:         Behavior: Behavior normal.           An electronic signature was used to authenticate this note.    Kat Martinez MD

## 2024-08-16 NOTE — RESULT ENCOUNTER NOTE
Urine is NOT infected.   If you want to finish the cipro you can if you feel your symptoms are improved, but you do not need more antibiotic..

## 2024-08-17 LAB
BACTERIA UR CULT: ABNORMAL
BACTERIA UR CULT: ABNORMAL
ORGANISM: ABNORMAL

## 2024-08-21 DIAGNOSIS — I10 ESSENTIAL HYPERTENSION: ICD-10-CM

## 2024-08-21 RX ORDER — VALSARTAN 320 MG/1
320 TABLET ORAL DAILY
Qty: 90 TABLET | Refills: 1 | Status: SHIPPED | OUTPATIENT
Start: 2024-08-21

## 2024-08-29 DIAGNOSIS — I10 ESSENTIAL HYPERTENSION: ICD-10-CM

## 2024-08-30 RX ORDER — PANTOPRAZOLE SODIUM 40 MG/1
TABLET, DELAYED RELEASE ORAL
Qty: 90 TABLET | Refills: 1 | Status: SHIPPED | OUTPATIENT
Start: 2024-08-30

## 2024-08-30 RX ORDER — VALSARTAN 320 MG/1
320 TABLET ORAL DAILY
Qty: 90 TABLET | Refills: 1 | Status: SHIPPED | OUTPATIENT
Start: 2024-08-30

## 2024-09-05 DIAGNOSIS — E79.0 HYPERURICEMIA: ICD-10-CM

## 2024-09-05 RX ORDER — METOPROLOL TARTRATE 25 MG/1
TABLET, FILM COATED ORAL
Qty: 180 TABLET | Refills: 1 | Status: SHIPPED | OUTPATIENT
Start: 2024-09-05

## 2024-09-05 RX ORDER — ALLOPURINOL 300 MG/1
300 TABLET ORAL DAILY
Qty: 90 TABLET | Refills: 1 | Status: SHIPPED | OUTPATIENT
Start: 2024-09-05

## 2024-09-08 DIAGNOSIS — Z86.711 HISTORY OF PULMONARY EMBOLISM: ICD-10-CM

## 2024-09-08 RX ORDER — APIXABAN 2.5 MG/1
2.5 TABLET, FILM COATED ORAL 2 TIMES DAILY
Qty: 180 TABLET | Refills: 2 | Status: SHIPPED | OUTPATIENT
Start: 2024-09-08

## 2024-09-18 ENCOUNTER — OFFICE VISIT (OUTPATIENT)
Dept: INTERNAL MEDICINE CLINIC | Age: 84
End: 2024-09-18

## 2024-09-18 VITALS
OXYGEN SATURATION: 95 % | BODY MASS INDEX: 39.07 KG/M2 | HEART RATE: 65 BPM | HEIGHT: 60 IN | WEIGHT: 199 LBS | SYSTOLIC BLOOD PRESSURE: 130 MMHG | DIASTOLIC BLOOD PRESSURE: 82 MMHG

## 2024-09-18 DIAGNOSIS — I25.10 CORONARY ARTERY DISEASE DUE TO LIPID RICH PLAQUE: ICD-10-CM

## 2024-09-18 DIAGNOSIS — N18.32 TYPE 2 DIABETES MELLITUS WITH STAGE 3B CHRONIC KIDNEY DISEASE, WITHOUT LONG-TERM CURRENT USE OF INSULIN (HCC): Primary | ICD-10-CM

## 2024-09-18 DIAGNOSIS — I25.83 CORONARY ARTERY DISEASE DUE TO LIPID RICH PLAQUE: ICD-10-CM

## 2024-09-18 DIAGNOSIS — M54.50 RIGHT LUMBAR PAIN: ICD-10-CM

## 2024-09-18 DIAGNOSIS — E11.22 TYPE 2 DIABETES MELLITUS WITH STAGE 3B CHRONIC KIDNEY DISEASE, WITHOUT LONG-TERM CURRENT USE OF INSULIN (HCC): Primary | ICD-10-CM

## 2024-09-18 LAB — HBA1C MFR BLD: 6.3 %

## 2024-10-02 RX ORDER — PANTOPRAZOLE SODIUM 40 MG/1
TABLET, DELAYED RELEASE ORAL
Qty: 90 TABLET | Refills: 1 | Status: SHIPPED | OUTPATIENT
Start: 2024-10-02

## 2024-12-03 DIAGNOSIS — F33.41 RECURRENT MAJOR DEPRESSIVE DISORDER, IN PARTIAL REMISSION (HCC): ICD-10-CM

## 2024-12-03 RX ORDER — DULOXETIN HYDROCHLORIDE 30 MG/1
CAPSULE, DELAYED RELEASE ORAL
Qty: 90 CAPSULE | Refills: 1 | Status: SHIPPED | OUTPATIENT
Start: 2024-12-03

## 2024-12-18 ENCOUNTER — OFFICE VISIT (OUTPATIENT)
Dept: INTERNAL MEDICINE CLINIC | Age: 84
End: 2024-12-18

## 2024-12-18 VITALS
HEART RATE: 66 BPM | HEIGHT: 60 IN | DIASTOLIC BLOOD PRESSURE: 78 MMHG | SYSTOLIC BLOOD PRESSURE: 114 MMHG | WEIGHT: 196.2 LBS | BODY MASS INDEX: 38.52 KG/M2 | OXYGEN SATURATION: 96 %

## 2024-12-18 DIAGNOSIS — N18.32 STAGE 3B CHRONIC KIDNEY DISEASE (HCC): ICD-10-CM

## 2024-12-18 DIAGNOSIS — N18.32 TYPE 2 DIABETES MELLITUS WITH STAGE 3B CHRONIC KIDNEY DISEASE, WITHOUT LONG-TERM CURRENT USE OF INSULIN (HCC): Primary | ICD-10-CM

## 2024-12-18 DIAGNOSIS — K21.00 GASTROESOPHAGEAL REFLUX DISEASE WITH ESOPHAGITIS WITHOUT HEMORRHAGE: ICD-10-CM

## 2024-12-18 DIAGNOSIS — E79.0 HYPERURICEMIA: ICD-10-CM

## 2024-12-18 DIAGNOSIS — E78.00 HYPERCHOLESTEREMIA: ICD-10-CM

## 2024-12-18 DIAGNOSIS — D50.9 IRON DEFICIENCY ANEMIA, UNSPECIFIED IRON DEFICIENCY ANEMIA TYPE: ICD-10-CM

## 2024-12-18 DIAGNOSIS — E11.22 TYPE 2 DIABETES MELLITUS WITH STAGE 3B CHRONIC KIDNEY DISEASE, WITHOUT LONG-TERM CURRENT USE OF INSULIN (HCC): Primary | ICD-10-CM

## 2024-12-18 DIAGNOSIS — N18.32 TYPE 2 DIABETES MELLITUS WITH STAGE 3B CHRONIC KIDNEY DISEASE, WITHOUT LONG-TERM CURRENT USE OF INSULIN (HCC): ICD-10-CM

## 2024-12-18 DIAGNOSIS — E11.22 TYPE 2 DIABETES MELLITUS WITH STAGE 3B CHRONIC KIDNEY DISEASE, WITHOUT LONG-TERM CURRENT USE OF INSULIN (HCC): ICD-10-CM

## 2024-12-18 DIAGNOSIS — Z23 NEED FOR VACCINATION: ICD-10-CM

## 2024-12-18 LAB
ALBUMIN SERPL-MCNC: 4.2 G/DL (ref 3.4–5)
ALBUMIN/GLOB SERPL: 1.8 {RATIO} (ref 1.1–2.2)
ALP SERPL-CCNC: 144 U/L (ref 40–129)
ALT SERPL-CCNC: 16 U/L (ref 10–40)
ANION GAP SERPL CALCULATED.3IONS-SCNC: 13 MMOL/L (ref 3–16)
AST SERPL-CCNC: 20 U/L (ref 15–37)
BILIRUB SERPL-MCNC: 0.4 MG/DL (ref 0–1)
BUN SERPL-MCNC: 20 MG/DL (ref 7–20)
CALCIUM SERPL-MCNC: 10.5 MG/DL (ref 8.3–10.6)
CHLORIDE SERPL-SCNC: 105 MMOL/L (ref 99–110)
CHOLEST SERPL-MCNC: 169 MG/DL (ref 0–199)
CO2 SERPL-SCNC: 25 MMOL/L (ref 21–32)
CREAT SERPL-MCNC: 1.1 MG/DL (ref 0.6–1.2)
GFR SERPLBLD CREATININE-BSD FMLA CKD-EPI: 49 ML/MIN/{1.73_M2}
GLUCOSE SERPL-MCNC: 101 MG/DL (ref 70–99)
HDLC SERPL-MCNC: 44 MG/DL (ref 40–60)
LDL CHOLESTEROL: 97 MG/DL
MAGNESIUM SERPL-MCNC: 2.2 MG/DL (ref 1.8–2.4)
POTASSIUM SERPL-SCNC: 3.7 MMOL/L (ref 3.5–5.1)
PROT SERPL-MCNC: 6.6 G/DL (ref 6.4–8.2)
SODIUM SERPL-SCNC: 143 MMOL/L (ref 136–145)
TRIGL SERPL-MCNC: 141 MG/DL (ref 0–150)
URATE SERPL-MCNC: 4.4 MG/DL (ref 2.6–6)
VLDLC SERPL CALC-MCNC: 28 MG/DL

## 2024-12-18 NOTE — PROGRESS NOTES
2024    Shwetha Maradiaga (:  1940) is a 84 y.o. female, here for evaluation of the following chief complaint(s):  3 Month Follow-Up (Bump on her head she bumped her head 2.5 weeks ago.)        ASSESSMENT/PLAN:    1. Type 2 diabetes mellitus with stage 3b chronic kidney disease, without long-term current use of insulin (HCC)  Chronic condition:  At goal and meeting medical guidelines.  Continue treatment.  Sugar is diet controlled.  Improved after weight loss.  - Comprehensive Metabolic Panel; Future  - Hemoglobin A1C; Future    2. Stage 3b chronic kidney disease (HCC)  Last est GFR 41.  Baseline GFR is in the 40s.  Stable.  Avoid nephrotoxins and keep BP under control.    - Comprehensive Metabolic Panel; Future    3. Gastroesophageal reflux disease with esophagitis without hemorrhage  Symptoms controlled on diaily PPI.  Continue treatment with PPI  - Magnesium; Future  - Vitamin B12; Future    4. Hyperuricemia  Chronic condition:  At goal and meeting medical guidelines.  Continue treatment.  - Uric Acid; Future    5. Iron deficiency anemia, unspecified iron deficiency anemia type  Resolved per last labs.    - CBC with Auto Differential; Future    6. Hypercholesteremia  Chronic.  Not at goal.  Last .  Is on maximally tolerated statin.  Has h/o CABG.  Encouraged to DC smoking   - Comprehensive Metabolic Panel; Future  - Lipid, Fasting; Future    7. Need for vaccination  - Influenza, FLUAD Trivalent, (age 65 y+), IM, Preservative Free, 0.5mL        FOLLOW UP:  Return in about 3 months (around 3/18/2025).      HPI    She fell recently.  She was sitting in the chair and bent over to get something off the floor.  She tumbled  over.    Has a house keeper that comes twice a week, and her 2 daughter's see her once a week so 4 days covered with others around.  Uses a walker in the home.   Overall getting a bit more feeble and weaker.  Mostly sedentary.  Has chronically limited vision and hearing. 
activity

## 2024-12-19 LAB
BASOPHILS # BLD: 0.1 K/UL (ref 0–0.2)
BASOPHILS NFR BLD: 0.9 %
DEPRECATED RDW RBC AUTO: 17.7 % (ref 12.4–15.4)
EOSINOPHIL # BLD: 0.2 K/UL (ref 0–0.6)
EOSINOPHIL NFR BLD: 1.8 %
EST. AVERAGE GLUCOSE BLD GHB EST-MCNC: 116.9 MG/DL
HBA1C MFR BLD: 5.7 %
HCT VFR BLD AUTO: 39.1 % (ref 36–48)
HGB BLD-MCNC: 12.7 G/DL (ref 12–16)
LYMPHOCYTES # BLD: 2.3 K/UL (ref 1–5.1)
LYMPHOCYTES NFR BLD: 21.1 %
MCH RBC QN AUTO: 29.7 PG (ref 26–34)
MCHC RBC AUTO-ENTMCNC: 32.4 G/DL (ref 31–36)
MCV RBC AUTO: 91.7 FL (ref 80–100)
MONOCYTES # BLD: 0.7 K/UL (ref 0–1.3)
MONOCYTES NFR BLD: 6.2 %
NEUTROPHILS # BLD: 7.5 K/UL (ref 1.7–7.7)
NEUTROPHILS NFR BLD: 70 %
PLATELET # BLD AUTO: 340 K/UL (ref 135–450)
PMV BLD AUTO: 9 FL (ref 5–10.5)
RBC # BLD AUTO: 4.27 M/UL (ref 4–5.2)
VIT B12 SERPL-MCNC: 3465 PG/ML (ref 211–911)
WBC # BLD AUTO: 10.8 K/UL (ref 4–11)

## 2024-12-22 NOTE — RESULT ENCOUNTER NOTE
Lab show you are getting too much B12.  Stop the B12 supplement.  Stay on the multivitamin.  That should be enough B12 in the multivitamin.  All other labs are stable or at goal.

## 2025-02-04 DIAGNOSIS — I25.119 ATHEROSCLEROSIS OF NATIVE CORONARY ARTERY OF NATIVE HEART WITH ANGINA PECTORIS (HCC): ICD-10-CM

## 2025-02-04 DIAGNOSIS — E78.00 HYPERCHOLESTEREMIA: ICD-10-CM

## 2025-02-04 RX ORDER — PITAVASTATIN CALCIUM 4.18 MG/1
TABLET, FILM COATED ORAL
Qty: 90 TABLET | Refills: 1 | Status: SHIPPED | OUTPATIENT
Start: 2025-02-04

## 2025-02-04 RX ORDER — DULOXETIN HYDROCHLORIDE 60 MG/1
CAPSULE, DELAYED RELEASE ORAL
Qty: 90 CAPSULE | Refills: 1 | Status: SHIPPED | OUTPATIENT
Start: 2025-02-04

## 2025-02-28 DIAGNOSIS — E79.0 HYPERURICEMIA: ICD-10-CM

## 2025-02-28 DIAGNOSIS — I10 ESSENTIAL HYPERTENSION: ICD-10-CM

## 2025-02-28 RX ORDER — ALLOPURINOL 300 MG/1
300 TABLET ORAL DAILY
Qty: 90 TABLET | Refills: 1 | Status: SHIPPED | OUTPATIENT
Start: 2025-02-28

## 2025-02-28 RX ORDER — VALSARTAN 320 MG/1
320 TABLET ORAL DAILY
Qty: 90 TABLET | Refills: 1 | Status: SHIPPED | OUTPATIENT
Start: 2025-02-28

## 2025-02-28 RX ORDER — METOPROLOL TARTRATE 25 MG/1
TABLET, FILM COATED ORAL
Qty: 180 TABLET | Refills: 1 | Status: SHIPPED | OUTPATIENT
Start: 2025-02-28

## 2025-02-28 NOTE — TELEPHONE ENCOUNTER
Last OV: 12/18/2024  Next OV: 3/31/2025    Next appointment due: Return in about 3 months (around 3/18/2025     Last fill: 9/5/2024, 9/5/2024, 8/30/2024  Refills:1, 1, 1

## 2025-03-04 RX ORDER — PANTOPRAZOLE SODIUM 40 MG/1
TABLET, DELAYED RELEASE ORAL
Qty: 90 TABLET | Refills: 1 | Status: SHIPPED | OUTPATIENT
Start: 2025-03-04

## 2025-03-31 ENCOUNTER — OFFICE VISIT (OUTPATIENT)
Dept: INTERNAL MEDICINE CLINIC | Age: 85
End: 2025-03-31
Payer: MEDICARE

## 2025-03-31 VITALS
OXYGEN SATURATION: 99 % | DIASTOLIC BLOOD PRESSURE: 78 MMHG | HEART RATE: 56 BPM | HEIGHT: 60 IN | WEIGHT: 190 LBS | SYSTOLIC BLOOD PRESSURE: 117 MMHG | BODY MASS INDEX: 37.3 KG/M2

## 2025-03-31 DIAGNOSIS — E11.22 TYPE 2 DIABETES MELLITUS WITH STAGE 3B CHRONIC KIDNEY DISEASE, WITHOUT LONG-TERM CURRENT USE OF INSULIN (HCC): ICD-10-CM

## 2025-03-31 DIAGNOSIS — N18.32 STAGE 3B CHRONIC KIDNEY DISEASE (HCC): ICD-10-CM

## 2025-03-31 DIAGNOSIS — J44.9 COPD, MILD (HCC): ICD-10-CM

## 2025-03-31 DIAGNOSIS — S99.922A INJURY OF LEFT TOE, INITIAL ENCOUNTER: ICD-10-CM

## 2025-03-31 DIAGNOSIS — R42 ORTHOSTATIC DIZZINESS: Primary | ICD-10-CM

## 2025-03-31 DIAGNOSIS — E66.01 OBESITY, MORBID: ICD-10-CM

## 2025-03-31 DIAGNOSIS — N18.32 TYPE 2 DIABETES MELLITUS WITH STAGE 3B CHRONIC KIDNEY DISEASE, WITHOUT LONG-TERM CURRENT USE OF INSULIN (HCC): ICD-10-CM

## 2025-03-31 PROCEDURE — 3023F SPIROM DOC REV: CPT | Performed by: FAMILY MEDICINE

## 2025-03-31 PROCEDURE — 3078F DIAST BP <80 MM HG: CPT | Performed by: FAMILY MEDICINE

## 2025-03-31 PROCEDURE — 1090F PRES/ABSN URINE INCON ASSESS: CPT | Performed by: FAMILY MEDICINE

## 2025-03-31 PROCEDURE — 3074F SYST BP LT 130 MM HG: CPT | Performed by: FAMILY MEDICINE

## 2025-03-31 PROCEDURE — 1160F RVW MEDS BY RX/DR IN RCRD: CPT | Performed by: FAMILY MEDICINE

## 2025-03-31 PROCEDURE — 1123F ACP DISCUSS/DSCN MKR DOCD: CPT | Performed by: FAMILY MEDICINE

## 2025-03-31 PROCEDURE — 4004F PT TOBACCO SCREEN RCVD TLK: CPT | Performed by: FAMILY MEDICINE

## 2025-03-31 PROCEDURE — 1159F MED LIST DOCD IN RCRD: CPT | Performed by: FAMILY MEDICINE

## 2025-03-31 PROCEDURE — 99214 OFFICE O/P EST MOD 30 MIN: CPT | Performed by: FAMILY MEDICINE

## 2025-03-31 PROCEDURE — G8427 DOCREV CUR MEDS BY ELIG CLIN: HCPCS | Performed by: FAMILY MEDICINE

## 2025-03-31 PROCEDURE — G8399 PT W/DXA RESULTS DOCUMENT: HCPCS | Performed by: FAMILY MEDICINE

## 2025-03-31 PROCEDURE — G8417 CALC BMI ABV UP PARAM F/U: HCPCS | Performed by: FAMILY MEDICINE

## 2025-03-31 RX ORDER — METOPROLOL TARTRATE 25 MG/1
TABLET, FILM COATED ORAL
Qty: 90 TABLET | Refills: 1 | Status: SHIPPED | OUTPATIENT
Start: 2025-03-31

## 2025-03-31 SDOH — ECONOMIC STABILITY: FOOD INSECURITY: WITHIN THE PAST 12 MONTHS, THE FOOD YOU BOUGHT JUST DIDN'T LAST AND YOU DIDN'T HAVE MONEY TO GET MORE.: NEVER TRUE

## 2025-03-31 SDOH — ECONOMIC STABILITY: FOOD INSECURITY: WITHIN THE PAST 12 MONTHS, YOU WORRIED THAT YOUR FOOD WOULD RUN OUT BEFORE YOU GOT MONEY TO BUY MORE.: NEVER TRUE

## 2025-03-31 ASSESSMENT — PATIENT HEALTH QUESTIONNAIRE - PHQ9
2. FEELING DOWN, DEPRESSED OR HOPELESS: NOT AT ALL
5. POOR APPETITE OR OVEREATING: NOT AT ALL
4. FEELING TIRED OR HAVING LITTLE ENERGY: NOT AT ALL
10. IF YOU CHECKED OFF ANY PROBLEMS, HOW DIFFICULT HAVE THESE PROBLEMS MADE IT FOR YOU TO DO YOUR WORK, TAKE CARE OF THINGS AT HOME, OR GET ALONG WITH OTHER PEOPLE: NOT DIFFICULT AT ALL
7. TROUBLE CONCENTRATING ON THINGS, SUCH AS READING THE NEWSPAPER OR WATCHING TELEVISION: NOT AT ALL
8. MOVING OR SPEAKING SO SLOWLY THAT OTHER PEOPLE COULD HAVE NOTICED. OR THE OPPOSITE, BEING SO FIGETY OR RESTLESS THAT YOU HAVE BEEN MOVING AROUND A LOT MORE THAN USUAL: NOT AT ALL
6. FEELING BAD ABOUT YOURSELF - OR THAT YOU ARE A FAILURE OR HAVE LET YOURSELF OR YOUR FAMILY DOWN: NOT AT ALL
9. THOUGHTS THAT YOU WOULD BE BETTER OFF DEAD, OR OF HURTING YOURSELF: NOT AT ALL
3. TROUBLE FALLING OR STAYING ASLEEP: NOT AT ALL
SUM OF ALL RESPONSES TO PHQ QUESTIONS 1-9: 0
SUM OF ALL RESPONSES TO PHQ QUESTIONS 1-9: 0
1. LITTLE INTEREST OR PLEASURE IN DOING THINGS: NOT AT ALL
SUM OF ALL RESPONSES TO PHQ QUESTIONS 1-9: 0
SUM OF ALL RESPONSES TO PHQ QUESTIONS 1-9: 0

## 2025-03-31 NOTE — PROGRESS NOTES
3/31/2025    Shwetha Maradiaga (:  1940) is a 84 y.o. female, here for evaluation of the following chief complaint(s):  3 Month Follow-Up (Left toe pain, feels like she broke her toe this morning. Still having dizzy spells when she fell in November. )    Ate breakfast.  1 pint of milk and an oatmeal cookie for breakfast this morning.  No other liquids or food taken yet today.    ASSESSMENT/PLAN:    1. Orthostatic dizziness  Initial sitting blood pressure 120/80.  Orthostatic recheck shows supine 145/80, sitting 142/77, standing 117/78 drop of 25 systolic points from sitting to standing.    Will reduce dose of metoprolol to 12.5 mg twice daily.  Discussed with patient need to drink more water.  Having 16 ounces of milk is not adequate hydration for being up and out of bed for 6 to 8 hours.      2. Injury of left toe, initial encounter  Possibly fractured but could be sprained.  Buddy taped the fourth to the fifth toe and instructed on this treatment with over-the-counter as needed pain medicine.    3. COPD, mild ([facility])  Encouraged to discontinue smoking.  She denies symptoms.    4. Type 2 diabetes mellitus with stage 3b chronic kidney disease, without long-term current use of insulin ([facility])  Last A1c of 5.7.   Highest A1c 6.5 in 2018.  Otherwise she runs in the prediabetic range.  Not clear how high her readings were before she became my patient.  She was much heavier at that time.    5. Stage 3b chronic kidney disease (HCC)  Chronic condition and stable.  Estimated GFR ranges from 30 to-49 in the past year.  Encouraged to drink plenty of water.    6. Obesity, morbid  History of 225-240 pounds in the past.  Now down to 290 pounds.  She thinks that the weight loss is simply because she does not eat as much.        FOLLOW UP:  Return in about 3 months (around 2025) for blood tests and medication check up..      HPI    She feels more dizzy and unbalanced since she fell in the fall.  Dizziness when she stands

## 2025-03-31 NOTE — PATIENT INSTRUCTIONS
Try to drink at least 25-30 oz of liquid in the morning and at least 25-30 oz in the afternoon and may get a little bit in the evening to take your pills.      This will help your dizziness to drink more liquid.    Xxxxxxxxx[id number]    Ayr or similar nasal saline GEL spray or in nasal saline gel in a tube can help with blood clots in the nose.

## 2025-04-01 PROBLEM — E66.813 CLASS 3 SEVERE OBESITY DUE TO EXCESS CALORIES WITH BODY MASS INDEX (BMI) OF 40.0 TO 44.9 IN ADULT: Status: RESOLVED | Noted: 2020-01-10 | Resolved: 2025-04-01

## 2025-04-01 PROBLEM — E66.01 OBESITY, MORBID: Status: ACTIVE | Noted: 2025-03-31

## 2025-04-01 PROBLEM — E66.01 CLASS 3 SEVERE OBESITY DUE TO EXCESS CALORIES WITH BODY MASS INDEX (BMI) OF 40.0 TO 44.9 IN ADULT: Status: RESOLVED | Noted: 2020-01-10 | Resolved: 2025-04-01

## 2025-04-07 ENCOUNTER — APPOINTMENT (OUTPATIENT)
Dept: GENERAL RADIOLOGY | Age: 85
DRG: 283 | End: 2025-04-07
Payer: MEDICARE

## 2025-04-07 ENCOUNTER — APPOINTMENT (OUTPATIENT)
Age: 85
DRG: 283 | End: 2025-04-07
Attending: INTERNAL MEDICINE
Payer: MEDICARE

## 2025-04-07 ENCOUNTER — APPOINTMENT (OUTPATIENT)
Dept: CT IMAGING | Age: 85
DRG: 283 | End: 2025-04-07
Payer: MEDICARE

## 2025-04-07 ENCOUNTER — HOSPITAL ENCOUNTER (INPATIENT)
Age: 85
LOS: 1 days | DRG: 283 | End: 2025-04-08
Attending: EMERGENCY MEDICINE | Admitting: INTERNAL MEDICINE
Payer: MEDICARE

## 2025-04-07 ENCOUNTER — APPOINTMENT (OUTPATIENT)
Dept: CT IMAGING | Age: 85
DRG: 283 | End: 2025-04-07
Attending: EMERGENCY MEDICINE
Payer: MEDICARE

## 2025-04-07 DIAGNOSIS — J81.0 ACUTE PULMONARY EDEMA (HCC): ICD-10-CM

## 2025-04-07 DIAGNOSIS — I21.3 STEMI (ST ELEVATION MYOCARDIAL INFARCTION) (HCC): ICD-10-CM

## 2025-04-07 DIAGNOSIS — I21.3 ST ELEVATION MYOCARDIAL INFARCTION (STEMI), UNSPECIFIED ARTERY (HCC): Primary | ICD-10-CM

## 2025-04-07 DIAGNOSIS — I21.21 ST ELEVATION MYOCARDIAL INFARCTION INVOLVING LEFT CIRCUMFLEX CORONARY ARTERY (HCC): ICD-10-CM

## 2025-04-07 DIAGNOSIS — J96.01 ACUTE RESPIRATORY FAILURE WITH HYPOXIA: ICD-10-CM

## 2025-04-07 PROBLEM — I25.702 CORONARY ARTERY DISEASE INVOLVING CORONARY BYPASS GRAFT OF NATIVE HEART WITH REFRACTORY ANGINA PECTORIS: Status: ACTIVE | Noted: 2025-04-07

## 2025-04-07 LAB
ALBUMIN SERPL-MCNC: 3.4 G/DL (ref 3.4–5)
ALBUMIN/GLOB SERPL: 1.1 {RATIO} (ref 1.1–2.2)
ALP SERPL-CCNC: 127 U/L (ref 40–129)
ALT SERPL-CCNC: 23 U/L (ref 10–40)
ANION GAP SERPL CALCULATED.3IONS-SCNC: 13 MMOL/L (ref 3–16)
APTT BLD: 32.9 SEC (ref 22.1–36.4)
AST SERPL-CCNC: 111 U/L (ref 15–37)
BASE EXCESS BLDA CALC-SCNC: -13 MMOL/L (ref -3–3)
BASE EXCESS BLDA CALC-SCNC: -14 MMOL/L (ref -3–3)
BASE EXCESS BLDA CALC-SCNC: -16 MMOL/L (ref -3–3)
BASOPHILS # BLD: 0.1 K/UL (ref 0–0.2)
BASOPHILS NFR BLD: 0.9 %
BILIRUB SERPL-MCNC: 0.5 MG/DL (ref 0–1)
BUN SERPL-MCNC: 20 MG/DL (ref 7–20)
CA-I BLD-SCNC: 1.35 MMOL/L (ref 1.12–1.32)
CALCIUM SERPL-MCNC: 9.8 MG/DL (ref 8.3–10.6)
CHLORIDE SERPL-SCNC: 105 MMOL/L (ref 99–110)
CO2 BLDA-SCNC: 16 MMOL/L
CO2 BLDA-SCNC: 18 MMOL/L
CO2 BLDA-SCNC: 21 MMOL/L
CO2 SERPL-SCNC: 20 MMOL/L (ref 21–32)
CREAT SERPL-MCNC: 1.2 MG/DL (ref 0.6–1.2)
DEPRECATED RDW RBC AUTO: 16.8 % (ref 12.4–15.4)
EOSINOPHIL # BLD: 0.1 K/UL (ref 0–0.6)
EOSINOPHIL NFR BLD: 0.5 %
GFR SERPLBLD CREATININE-BSD FMLA CKD-EPI: 45 ML/MIN/{1.73_M2}
GLUCOSE BLD-MCNC: 224 MG/DL (ref 70–99)
GLUCOSE SERPL-MCNC: 114 MG/DL (ref 70–99)
HCO3 BLDA-SCNC: 14.5 MMOL/L (ref 21–29)
HCO3 BLDA-SCNC: 16.4 MMOL/L (ref 21–29)
HCO3 BLDA-SCNC: 18.5 MMOL/L (ref 21–29)
HCT VFR BLD AUTO: 38.1 % (ref 36–48)
HGB BLD-MCNC: 12.6 G/DL (ref 12–16)
INR PPP: 1.01 (ref 0.85–1.15)
LACTATE BLD-SCNC: 6.55 MMOL/L (ref 0.4–2)
LACTATE BLD-SCNC: 6.88 MMOL/L (ref 0.4–2)
LACTATE BLD-SCNC: 8.4 MMOL/L (ref 0.4–2)
LIPASE SERPL-CCNC: 21 U/L (ref 13–60)
LYMPHOCYTES # BLD: 3 K/UL (ref 1–5.1)
LYMPHOCYTES NFR BLD: 18.6 %
MCH RBC QN AUTO: 29.7 PG (ref 26–34)
MCHC RBC AUTO-ENTMCNC: 33.1 G/DL (ref 31–36)
MCV RBC AUTO: 89.9 FL (ref 80–100)
MONOCYTES # BLD: 1 K/UL (ref 0–1.3)
MONOCYTES NFR BLD: 6.5 %
NEUTROPHILS # BLD: 11.8 K/UL (ref 1.7–7.7)
NEUTROPHILS NFR BLD: 73.5 %
PCO2 BLDA: 52.3 MM HG (ref 35–45)
PCO2 BLDA: 57.9 MM HG (ref 35–45)
PCO2 BLDA: 77.6 MM HG (ref 35–45)
PERFORMED ON: ABNORMAL
PH BLDA: 6.98 [PH] (ref 7.35–7.45)
PH BLDA: 7.05 [PH] (ref 7.35–7.45)
PH BLDA: 7.06 [PH] (ref 7.35–7.45)
PLATELET # BLD AUTO: 305 K/UL (ref 135–450)
PMV BLD AUTO: 9.2 FL (ref 5–10.5)
PO2 BLDA: 102.7 MM HG (ref 75–108)
PO2 BLDA: 110.5 MM HG (ref 75–108)
PO2 BLDA: 89.1 MM HG (ref 75–108)
POC ACT LR: 150 SEC
POC ACT LR: 236 SEC
POC SAMPLE TYPE: ABNORMAL
POTASSIUM SERPL-SCNC: 4 MMOL/L (ref 3.5–5.1)
PROT SERPL-MCNC: 6.4 G/DL (ref 6.4–8.2)
PROTHROMBIN TIME: 13.5 SEC (ref 11.9–14.9)
RBC # BLD AUTO: 4.24 M/UL (ref 4–5.2)
SAO2 % BLDA: 89 % (ref 93–100)
SAO2 % BLDA: 94 % (ref 93–100)
SAO2 % BLDA: 95 % (ref 93–100)
SODIUM SERPL-SCNC: 138 MMOL/L (ref 136–145)
TROPONIN, HIGH SENSITIVITY: 1065 NG/L (ref 0–14)
TSH SERPL DL<=0.005 MIU/L-ACNC: 2.78 UIU/ML (ref 0.27–4.2)
WBC # BLD AUTO: 16.1 K/UL (ref 4–11)

## 2025-04-07 PROCEDURE — 3E033XZ INTRODUCTION OF VASOPRESSOR INTO PERIPHERAL VEIN, PERCUTANEOUS APPROACH: ICD-10-PCS | Performed by: INTERNAL MEDICINE

## 2025-04-07 PROCEDURE — B2111ZZ FLUOROSCOPY OF MULTIPLE CORONARY ARTERIES USING LOW OSMOLAR CONTRAST: ICD-10-PCS | Performed by: INTERNAL MEDICINE

## 2025-04-07 PROCEDURE — C1769 GUIDE WIRE: HCPCS | Performed by: INTERNAL MEDICINE

## 2025-04-07 PROCEDURE — 85347 COAGULATION TIME ACTIVATED: CPT

## 2025-04-07 PROCEDURE — 93455 CORONARY ART/GRFT ANGIO S&I: CPT | Performed by: INTERNAL MEDICINE

## 2025-04-07 PROCEDURE — 99223 1ST HOSP IP/OBS HIGH 75: CPT | Performed by: INTERNAL MEDICINE

## 2025-04-07 PROCEDURE — 36620 INSERTION CATHETER ARTERY: CPT | Performed by: INTERNAL MEDICINE

## 2025-04-07 PROCEDURE — 2500000003 HC RX 250 WO HCPCS: Performed by: INTERNAL MEDICINE

## 2025-04-07 PROCEDURE — C1760 CLOSURE DEV, VASC: HCPCS | Performed by: INTERNAL MEDICINE

## 2025-04-07 PROCEDURE — 2500000003 HC RX 250 WO HCPCS

## 2025-04-07 PROCEDURE — 99285 EMERGENCY DEPT VISIT HI MDM: CPT

## 2025-04-07 PROCEDURE — 2709999900 HC NON-CHARGEABLE SUPPLY: Performed by: INTERNAL MEDICINE

## 2025-04-07 PROCEDURE — 71045 X-RAY EXAM CHEST 1 VIEW: CPT

## 2025-04-07 PROCEDURE — 99153 MOD SED SAME PHYS/QHP EA: CPT | Performed by: INTERNAL MEDICINE

## 2025-04-07 PROCEDURE — 31500 INSERT EMERGENCY AIRWAY: CPT

## 2025-04-07 PROCEDURE — 5A1935Z RESPIRATORY VENTILATION, LESS THAN 24 CONSECUTIVE HOURS: ICD-10-PCS | Performed by: INTERNAL MEDICINE

## 2025-04-07 PROCEDURE — 99291 CRITICAL CARE FIRST HOUR: CPT | Performed by: INTERNAL MEDICINE

## 2025-04-07 PROCEDURE — 93005 ELECTROCARDIOGRAM TRACING: CPT | Performed by: EMERGENCY MEDICINE

## 2025-04-07 PROCEDURE — 84443 ASSAY THYROID STIM HORMONE: CPT

## 2025-04-07 PROCEDURE — 94761 N-INVAS EAR/PLS OXIMETRY MLT: CPT

## 2025-04-07 PROCEDURE — 83690 ASSAY OF LIPASE: CPT

## 2025-04-07 PROCEDURE — 6360000004 HC RX CONTRAST MEDICATION: Performed by: EMERGENCY MEDICINE

## 2025-04-07 PROCEDURE — 74174 CTA ABD&PLVS W/CONTRAST: CPT

## 2025-04-07 PROCEDURE — 4A023N7 MEASUREMENT OF CARDIAC SAMPLING AND PRESSURE, LEFT HEART, PERCUTANEOUS APPROACH: ICD-10-PCS | Performed by: INTERNAL MEDICINE

## 2025-04-07 PROCEDURE — 6360000002 HC RX W HCPCS: Performed by: INTERNAL MEDICINE

## 2025-04-07 PROCEDURE — 82947 ASSAY GLUCOSE BLOOD QUANT: CPT

## 2025-04-07 PROCEDURE — 02HV33Z INSERTION OF INFUSION DEVICE INTO SUPERIOR VENA CAVA, PERCUTANEOUS APPROACH: ICD-10-PCS | Performed by: INTERNAL MEDICINE

## 2025-04-07 PROCEDURE — 80053 COMPREHEN METABOLIC PANEL: CPT

## 2025-04-07 PROCEDURE — 04HL33Z INSERTION OF INFUSION DEVICE INTO LEFT FEMORAL ARTERY, PERCUTANEOUS APPROACH: ICD-10-PCS | Performed by: INTERNAL MEDICINE

## 2025-04-07 PROCEDURE — 99152 MOD SED SAME PHYS/QHP 5/>YRS: CPT | Performed by: INTERNAL MEDICINE

## 2025-04-07 PROCEDURE — 36620 INSERTION CATHETER ARTERY: CPT

## 2025-04-07 PROCEDURE — 6360000002 HC RX W HCPCS

## 2025-04-07 PROCEDURE — 85610 PROTHROMBIN TIME: CPT

## 2025-04-07 PROCEDURE — C1894 INTRO/SHEATH, NON-LASER: HCPCS | Performed by: INTERNAL MEDICINE

## 2025-04-07 PROCEDURE — 2580000003 HC RX 258

## 2025-04-07 PROCEDURE — 6370000000 HC RX 637 (ALT 250 FOR IP): Performed by: STUDENT IN AN ORGANIZED HEALTH CARE EDUCATION/TRAINING PROGRAM

## 2025-04-07 PROCEDURE — 36556 INSERT NON-TUNNEL CV CATH: CPT

## 2025-04-07 PROCEDURE — 6360000004 HC RX CONTRAST MEDICATION: Performed by: INTERNAL MEDICINE

## 2025-04-07 PROCEDURE — 0DH67UZ INSERTION OF FEEDING DEVICE INTO STOMACH, VIA NATURAL OR ARTIFICIAL OPENING: ICD-10-PCS | Performed by: INTERNAL MEDICINE

## 2025-04-07 PROCEDURE — 93005 ELECTROCARDIOGRAM TRACING: CPT | Performed by: INTERNAL MEDICINE

## 2025-04-07 PROCEDURE — 94002 VENT MGMT INPAT INIT DAY: CPT

## 2025-04-07 PROCEDURE — 2580000003 HC RX 258: Performed by: INTERNAL MEDICINE

## 2025-04-07 PROCEDURE — 72125 CT NECK SPINE W/O DYE: CPT

## 2025-04-07 PROCEDURE — 74018 RADEX ABDOMEN 1 VIEW: CPT

## 2025-04-07 PROCEDURE — 2000000000 HC ICU R&B

## 2025-04-07 PROCEDURE — 7100000011 HC PHASE II RECOVERY - ADDTL 15 MIN: Performed by: INTERNAL MEDICINE

## 2025-04-07 PROCEDURE — 85730 THROMBOPLASTIN TIME PARTIAL: CPT

## 2025-04-07 PROCEDURE — 82803 BLOOD GASES ANY COMBINATION: CPT

## 2025-04-07 PROCEDURE — 0BH17EZ INSERTION OF ENDOTRACHEAL AIRWAY INTO TRACHEA, VIA NATURAL OR ARTIFICIAL OPENING: ICD-10-PCS | Performed by: INTERNAL MEDICINE

## 2025-04-07 PROCEDURE — 2700000000 HC OXYGEN THERAPY PER DAY

## 2025-04-07 PROCEDURE — C8924 2D TTE W OR W/O FOL W/CON,FU: HCPCS

## 2025-04-07 PROCEDURE — 83605 ASSAY OF LACTIC ACID: CPT

## 2025-04-07 PROCEDURE — 82330 ASSAY OF CALCIUM: CPT

## 2025-04-07 PROCEDURE — 70450 CT HEAD/BRAIN W/O DYE: CPT

## 2025-04-07 PROCEDURE — 7100000010 HC PHASE II RECOVERY - FIRST 15 MIN: Performed by: INTERNAL MEDICINE

## 2025-04-07 PROCEDURE — B2131ZZ FLUOROSCOPY OF MULTIPLE CORONARY ARTERY BYPASS GRAFTS USING LOW OSMOLAR CONTRAST: ICD-10-PCS | Performed by: INTERNAL MEDICINE

## 2025-04-07 PROCEDURE — C1887 CATHETER, GUIDING: HCPCS | Performed by: INTERNAL MEDICINE

## 2025-04-07 PROCEDURE — 84484 ASSAY OF TROPONIN QUANT: CPT

## 2025-04-07 PROCEDURE — 85025 COMPLETE CBC W/AUTO DIFF WBC: CPT

## 2025-04-07 RX ORDER — ONDANSETRON 2 MG/ML
4 INJECTION INTRAMUSCULAR; INTRAVENOUS EVERY 6 HOURS PRN
Status: DISCONTINUED | OUTPATIENT
Start: 2025-04-07 | End: 2025-04-08 | Stop reason: HOSPADM

## 2025-04-07 RX ORDER — DULOXETIN HYDROCHLORIDE 30 MG/1
30 CAPSULE, DELAYED RELEASE ORAL DAILY
Status: DISCONTINUED | OUTPATIENT
Start: 2025-04-07 | End: 2025-04-08 | Stop reason: HOSPADM

## 2025-04-07 RX ORDER — VALSARTAN 160 MG/1
320 TABLET ORAL DAILY
Status: DISCONTINUED | OUTPATIENT
Start: 2025-04-07 | End: 2025-04-08 | Stop reason: HOSPADM

## 2025-04-07 RX ORDER — MAGNESIUM SULFATE IN WATER 40 MG/ML
2000 INJECTION, SOLUTION INTRAVENOUS PRN
Status: DISCONTINUED | OUTPATIENT
Start: 2025-04-07 | End: 2025-04-08 | Stop reason: HOSPADM

## 2025-04-07 RX ORDER — POTASSIUM CHLORIDE 7.45 MG/ML
10 INJECTION INTRAVENOUS PRN
Status: DISCONTINUED | OUTPATIENT
Start: 2025-04-07 | End: 2025-04-08 | Stop reason: HOSPADM

## 2025-04-07 RX ORDER — SODIUM CHLORIDE 0.9 % (FLUSH) 0.9 %
5-40 SYRINGE (ML) INJECTION EVERY 12 HOURS SCHEDULED
Status: DISCONTINUED | OUTPATIENT
Start: 2025-04-07 | End: 2025-04-08 | Stop reason: HOSPADM

## 2025-04-07 RX ORDER — POTASSIUM CHLORIDE 1500 MG/1
40 TABLET, EXTENDED RELEASE ORAL PRN
Status: DISCONTINUED | OUTPATIENT
Start: 2025-04-07 | End: 2025-04-08 | Stop reason: HOSPADM

## 2025-04-07 RX ORDER — ATORVASTATIN CALCIUM 20 MG/1
20 TABLET, FILM COATED ORAL NIGHTLY
Status: DISCONTINUED | OUTPATIENT
Start: 2025-04-07 | End: 2025-04-08 | Stop reason: HOSPADM

## 2025-04-07 RX ORDER — ROCURONIUM BROMIDE 10 MG/ML
INJECTION, SOLUTION INTRAVENOUS
Status: COMPLETED
Start: 2025-04-07 | End: 2025-04-07

## 2025-04-07 RX ORDER — DOBUTAMINE HYDROCHLORIDE 200 MG/100ML
2.5-1 INJECTION INTRAVENOUS CONTINUOUS
Status: DISCONTINUED | OUTPATIENT
Start: 2025-04-07 | End: 2025-04-08 | Stop reason: HOSPADM

## 2025-04-07 RX ORDER — ACETAMINOPHEN 325 MG/1
650 TABLET ORAL EVERY 4 HOURS PRN
Status: DISCONTINUED | OUTPATIENT
Start: 2025-04-07 | End: 2025-04-08 | Stop reason: HOSPADM

## 2025-04-07 RX ORDER — HEPARIN SODIUM 1000 [USP'U]/ML
INJECTION, SOLUTION INTRAVENOUS; SUBCUTANEOUS PRN
Status: DISCONTINUED | OUTPATIENT
Start: 2025-04-07 | End: 2025-04-07 | Stop reason: HOSPADM

## 2025-04-07 RX ORDER — IOPAMIDOL 755 MG/ML
INJECTION, SOLUTION INTRAVASCULAR PRN
Status: DISCONTINUED | OUTPATIENT
Start: 2025-04-07 | End: 2025-04-07 | Stop reason: HOSPADM

## 2025-04-07 RX ORDER — EPTIFIBATIDE 0.75 MG/ML
INJECTION, SOLUTION INTRAVENOUS CONTINUOUS PRN
Status: DISCONTINUED | OUTPATIENT
Start: 2025-04-07 | End: 2025-04-07 | Stop reason: HOSPADM

## 2025-04-07 RX ORDER — ONDANSETRON 4 MG/1
4 TABLET, ORALLY DISINTEGRATING ORAL EVERY 8 HOURS PRN
Status: DISCONTINUED | OUTPATIENT
Start: 2025-04-07 | End: 2025-04-08 | Stop reason: HOSPADM

## 2025-04-07 RX ORDER — ROCURONIUM BROMIDE 10 MG/ML
100 INJECTION, SOLUTION INTRAVENOUS ONCE
Status: COMPLETED | OUTPATIENT
Start: 2025-04-07 | End: 2025-04-07

## 2025-04-07 RX ORDER — ALLOPURINOL 300 MG/1
300 TABLET ORAL DAILY
Status: DISCONTINUED | OUTPATIENT
Start: 2025-04-07 | End: 2025-04-08 | Stop reason: HOSPADM

## 2025-04-07 RX ORDER — IOPAMIDOL 755 MG/ML
75 INJECTION, SOLUTION INTRAVASCULAR
Status: COMPLETED | OUTPATIENT
Start: 2025-04-07 | End: 2025-04-07

## 2025-04-07 RX ORDER — EPTIFIBATIDE 0.75 MG/ML
INJECTION, SOLUTION INTRAVENOUS CONTINUOUS PRN
Status: COMPLETED | OUTPATIENT
Start: 2025-04-07 | End: 2025-04-07

## 2025-04-07 RX ORDER — NOREPINEPHRINE BITARTRATE 0.06 MG/ML
1-100 INJECTION, SOLUTION INTRAVENOUS CONTINUOUS
Status: DISCONTINUED | OUTPATIENT
Start: 2025-04-07 | End: 2025-04-07

## 2025-04-07 RX ORDER — NOREPINEPHRINE BITARTRATE 0.06 MG/ML
INJECTION, SOLUTION INTRAVENOUS
Status: DISCONTINUED
Start: 2025-04-07 | End: 2025-04-08 | Stop reason: HOSPADM

## 2025-04-07 RX ORDER — METOPROLOL TARTRATE 25 MG/1
25 TABLET, FILM COATED ORAL 2 TIMES DAILY
Status: DISCONTINUED | OUTPATIENT
Start: 2025-04-07 | End: 2025-04-07

## 2025-04-07 RX ORDER — ETOMIDATE 2 MG/ML
20 INJECTION INTRAVENOUS ONCE
Status: COMPLETED | OUTPATIENT
Start: 2025-04-07 | End: 2025-04-07

## 2025-04-07 RX ORDER — MORPHINE SULFATE 2 MG/ML
2 INJECTION, SOLUTION INTRAMUSCULAR; INTRAVENOUS ONCE
Status: COMPLETED | OUTPATIENT
Start: 2025-04-07 | End: 2025-04-07

## 2025-04-07 RX ORDER — ALBUTEROL SULFATE 90 UG/1
2 INHALANT RESPIRATORY (INHALATION) EVERY 4 HOURS PRN
Status: DISCONTINUED | OUTPATIENT
Start: 2025-04-07 | End: 2025-04-08 | Stop reason: HOSPADM

## 2025-04-07 RX ORDER — CLOPIDOGREL BISULFATE 75 MG/1
75 TABLET ORAL DAILY
Status: DISCONTINUED | OUTPATIENT
Start: 2025-04-08 | End: 2025-04-08 | Stop reason: HOSPADM

## 2025-04-07 RX ORDER — PANTOPRAZOLE SODIUM 40 MG/10ML
40 INJECTION, POWDER, LYOPHILIZED, FOR SOLUTION INTRAVENOUS DAILY
Status: DISCONTINUED | OUTPATIENT
Start: 2025-04-07 | End: 2025-04-08 | Stop reason: HOSPADM

## 2025-04-07 RX ORDER — ACETAMINOPHEN 325 MG/1
650 TABLET ORAL EVERY 6 HOURS PRN
Status: DISCONTINUED | OUTPATIENT
Start: 2025-04-07 | End: 2025-04-07

## 2025-04-07 RX ORDER — M-VIT,TX,IRON,MINS/CALC/FOLIC 27MG-0.4MG
1 TABLET ORAL DAILY
Status: DISCONTINUED | OUTPATIENT
Start: 2025-04-07 | End: 2025-04-08 | Stop reason: HOSPADM

## 2025-04-07 RX ORDER — POLYETHYLENE GLYCOL 3350 17 G/17G
17 POWDER, FOR SOLUTION ORAL DAILY PRN
Status: DISCONTINUED | OUTPATIENT
Start: 2025-04-07 | End: 2025-04-08 | Stop reason: HOSPADM

## 2025-04-07 RX ORDER — SODIUM CHLORIDE 9 MG/ML
INJECTION, SOLUTION INTRAVENOUS PRN
Status: DISCONTINUED | OUTPATIENT
Start: 2025-04-07 | End: 2025-04-08 | Stop reason: HOSPADM

## 2025-04-07 RX ORDER — PROPOFOL 10 MG/ML
INJECTION, EMULSION INTRAVENOUS
Status: COMPLETED
Start: 2025-04-07 | End: 2025-04-07

## 2025-04-07 RX ORDER — SODIUM CHLORIDE 9 MG/ML
INJECTION, SOLUTION INTRAVENOUS
Status: DISCONTINUED
Start: 2025-04-07 | End: 2025-04-08 | Stop reason: HOSPADM

## 2025-04-07 RX ORDER — DULOXETIN HYDROCHLORIDE 30 MG/1
60 CAPSULE, DELAYED RELEASE ORAL DAILY
Status: DISCONTINUED | OUTPATIENT
Start: 2025-04-07 | End: 2025-04-08 | Stop reason: HOSPADM

## 2025-04-07 RX ORDER — ETOMIDATE 2 MG/ML
INJECTION INTRAVENOUS
Status: COMPLETED
Start: 2025-04-07 | End: 2025-04-07

## 2025-04-07 RX ORDER — ACETAMINOPHEN 650 MG/1
650 SUPPOSITORY RECTAL EVERY 6 HOURS PRN
Status: DISCONTINUED | OUTPATIENT
Start: 2025-04-07 | End: 2025-04-08 | Stop reason: HOSPADM

## 2025-04-07 RX ORDER — ASPIRIN 81 MG/1
81 TABLET, CHEWABLE ORAL DAILY
Status: DISCONTINUED | OUTPATIENT
Start: 2025-04-07 | End: 2025-04-08 | Stop reason: HOSPADM

## 2025-04-07 RX ORDER — FENTANYL CITRATE 50 UG/ML
INJECTION, SOLUTION INTRAMUSCULAR; INTRAVENOUS PRN
Status: DISCONTINUED | OUTPATIENT
Start: 2025-04-07 | End: 2025-04-07 | Stop reason: HOSPADM

## 2025-04-07 RX ORDER — SODIUM CHLORIDE 0.9 % (FLUSH) 0.9 %
5-40 SYRINGE (ML) INJECTION PRN
Status: DISCONTINUED | OUTPATIENT
Start: 2025-04-07 | End: 2025-04-08 | Stop reason: HOSPADM

## 2025-04-07 RX ORDER — FUROSEMIDE 10 MG/ML
60 INJECTION INTRAMUSCULAR; INTRAVENOUS ONCE
Status: COMPLETED | OUTPATIENT
Start: 2025-04-07 | End: 2025-04-07

## 2025-04-07 RX ORDER — MORPHINE SULFATE 2 MG/ML
INJECTION, SOLUTION INTRAMUSCULAR; INTRAVENOUS
Status: COMPLETED
Start: 2025-04-07 | End: 2025-04-07

## 2025-04-07 RX ORDER — LIDOCAINE HYDROCHLORIDE 10 MG/ML
INJECTION, SOLUTION INFILTRATION; PERINEURAL PRN
Status: DISCONTINUED | OUTPATIENT
Start: 2025-04-07 | End: 2025-04-07 | Stop reason: HOSPADM

## 2025-04-07 RX ORDER — PROPOFOL 10 MG/ML
5-50 INJECTION, EMULSION INTRAVENOUS CONTINUOUS
Status: DISCONTINUED | OUTPATIENT
Start: 2025-04-07 | End: 2025-04-08 | Stop reason: HOSPADM

## 2025-04-07 RX ORDER — MIDAZOLAM HYDROCHLORIDE 1 MG/ML
INJECTION, SOLUTION INTRAMUSCULAR; INTRAVENOUS PRN
Status: DISCONTINUED | OUTPATIENT
Start: 2025-04-07 | End: 2025-04-07 | Stop reason: HOSPADM

## 2025-04-07 RX ORDER — ENOXAPARIN SODIUM 100 MG/ML
40 INJECTION SUBCUTANEOUS DAILY
Status: DISCONTINUED | OUTPATIENT
Start: 2025-04-07 | End: 2025-04-08 | Stop reason: HOSPADM

## 2025-04-07 RX ORDER — LORAZEPAM 2 MG/ML
INJECTION INTRAMUSCULAR
Status: DISCONTINUED
Start: 2025-04-07 | End: 2025-04-08 | Stop reason: HOSPADM

## 2025-04-07 RX ORDER — PANTOPRAZOLE SODIUM 40 MG/1
40 TABLET, DELAYED RELEASE ORAL
Status: DISCONTINUED | OUTPATIENT
Start: 2025-04-08 | End: 2025-04-07

## 2025-04-07 RX ADMIN — ETOMIDATE 20 MG: 20 INJECTION, SOLUTION INTRAVENOUS at 15:54

## 2025-04-07 RX ADMIN — MORPHINE SULFATE 2 MG: 2 INJECTION, SOLUTION INTRAMUSCULAR; INTRAVENOUS at 15:41

## 2025-04-07 RX ADMIN — FENTANYL CITRATE 50 MCG/HR: 50 INJECTION, SOLUTION INTRAMUSCULAR; INTRAVENOUS at 17:30

## 2025-04-07 RX ADMIN — ETOMIDATE 20 MG: 2 INJECTION INTRAVENOUS at 15:54

## 2025-04-07 RX ADMIN — SODIUM BICARBONATE: 84 INJECTION, SOLUTION INTRAVENOUS at 21:06

## 2025-04-07 RX ADMIN — SULFUR HEXAFLUORIDE 2 ML: 60.7; .19; .19 INJECTION, POWDER, LYOPHILIZED, FOR SUSPENSION INTRAVENOUS; INTRAVESICAL at 15:47

## 2025-04-07 RX ADMIN — DOBUTAMINE HYDROCHLORIDE 2.5 MCG/KG/MIN: 200 INJECTION INTRAVENOUS at 20:53

## 2025-04-07 RX ADMIN — FUROSEMIDE 60 MG: 10 INJECTION, SOLUTION INTRAMUSCULAR; INTRAVENOUS at 14:42

## 2025-04-07 RX ADMIN — ROCURONIUM BROMIDE 100 MG: 10 INJECTION, SOLUTION INTRAVENOUS at 15:55

## 2025-04-07 RX ADMIN — PROPOFOL 20 MCG/KG/MIN: 10 INJECTION, EMULSION INTRAVENOUS at 16:38

## 2025-04-07 RX ADMIN — FUROSEMIDE 10 MG/HR: 10 INJECTION, SOLUTION INTRAMUSCULAR; INTRAVENOUS at 16:54

## 2025-04-07 RX ADMIN — NOREPINEPHRINE BITARTRATE 10 MCG/MIN: 64 SOLUTION INTRAVENOUS at 16:03

## 2025-04-07 RX ADMIN — IOPAMIDOL 75 ML: 755 INJECTION, SOLUTION INTRAVENOUS at 13:13

## 2025-04-07 RX ADMIN — PHENYLEPHRINE HYDROCHLORIDE 30 MCG/MIN: 50 INJECTION INTRAVENOUS at 16:40

## 2025-04-07 ASSESSMENT — ENCOUNTER SYMPTOMS
BACK PAIN: 1
SHORTNESS OF BREATH: 1
SORE THROAT: 0
ABDOMINAL PAIN: 1

## 2025-04-07 ASSESSMENT — PULMONARY FUNCTION TESTS
PIF_VALUE: 25
PIF_VALUE: 25

## 2025-04-07 NOTE — OP NOTE
Arterial Catheter Insertion Procedure Note    Procedure: Insertion of Arterial Catheter    Indications:  Hypotension    Procedure Details   Informed consent was obtained for the procedure, including sedation.  Risks of  hemorrhage, arrhythmia, infection and adverse drug reaction were discussed.     Under sterile conditions the skin over the left groin was prepped using Chlorhexidine and covered with a sterile drape. 1% Local anesthesia was applied to the skin and subcutaneous tissues. # 20G Arrow femoral artery catheter was utilized for the procedure. The needle was placed into the left femoral artery under ultrasound guidance and upon blood return the guide wire was passed easily through the needle. Subsequently the #20G catheter was then advanced with no resistance over the guidewire. Good pulsatile blood returned noted when the guide wire was withdrawn. The catheter was sutured into place using 2-0 mersilk suture.    EBL: 3cc    Findings:  There were no changes to vital signs. Patient did tolerate procedure well.    Total time of procedure 15 minutes.

## 2025-04-07 NOTE — INTERDISCIPLINARY ROUNDS
Spiritual Health History and Assessment/Progress Note  [facility]    Crisis, Rapid Response,  ,      Name: Shwetha Maradiaga MRN: [id number]    Age: 84 y.o.     Sex: female   Language: English   Advent: Christianity   STEMI (ST elevation myocardial infarction) (HCC)     Date: 4/7/2025            Total Time Calculated: 25 min              Spiritual Assessment began in [facility]        Referral/Consult From: Nurse   Encounter Overview/Reason: Crisis  Service Provided For: Family    Simin, Belief, Meaning:   Patient identifies as spiritual, is connected with a simin tradition or spiritual practice, and has beliefs or practices that help with coping during difficult times  Family/Friends identify as spiritual, are connected with a simin tradition or spiritual practice, and have beliefs or practices that help with coping during difficult times      Importance and Influence:  Patient has spiritual/personal beliefs that influence decisions regarding their health  Family/Friends have spiritual/personal beliefs that influence decisions regarding the patient's health    Community:  Patient is connected with a spiritual community and feels well-supported. Support system includes: Children and Simin Community  Family/Friends are connected with a spiritual community: and feel well-supported. Support system includes: Simin Community and Extended family    Assessment and Plan of Care:     Patient Interventions include: Other: Patient was having a procedure being done doing the visit.  Family/Friends Interventions include: Facilitated expression of thoughts and feelings, Explored spiritual coping/struggle/distress, and Engaged in theological reflection    Patient Plan of Care: No spiritual needs identified for follow-up and Spiritual Care available upon further referral  Family/Friends Plan of Care: No spiritual needs identified for follow-up and Spiritual Care available upon further referral    Electronically signed by  Chaplain Albino on 4/7/2025 at 5:23 PM

## 2025-04-07 NOTE — ED PROVIDER NOTES
[facility] CATH LAB  EMERGENCY DEPARTMENT ENCOUNTER        Pt Name: Shwetha Maradiaga  MRN: [id number]  Birthdate 1940  Date of evaluation: 4/7/2025  Provider: Mervin Villasenor MD  PCP: Kat Martinez MD  Note Started: 1:40 PM EDT 4/7/25    CHIEF COMPLAINT       Chief Complaint   Patient presents with    Shortness of Breath     Pt via [facility] from home, c/o abdominal pain radiating to the back, and shortness of breath, STEMI called en route        HISTORY OF PRESENT ILLNESS: 1 or more Elements     History from : Patient and EMS    Limitations to history : None    Shwetha Maradiaga is a 84 y.o. female who presents for central epigastric abdominal pain rating to the back with associated shortness of breath.  Pain does radiate upwards.  History of CAD history of hypertension history of GERD.  Patient also has a history of CKD stage III.  Diabetes.  Pain started last night significantly worse. Squeezing and stabbing in nature at times.  EMS contacted us for possible STEMI bypass.  I personally spoke to EMS as well as cardiology prior to patient's arrival to the ER cardiology was waiting for the patient at arrival.  Patient is still having active chest pain received aspirin and nitro en route.  Patient is on Eliquis.  Last cardiac cath was 2015    Nursing Notes were all reviewed and agreed with or any disagreements were addressed in the HPI.    REVIEW OF SYSTEMS :      Review of Systems   Constitutional: Negative.  Negative for chills and fever.   HENT:  Negative for congestion and sore throat.    Respiratory:  Positive for shortness of breath.    Cardiovascular:  Positive for chest pain.   Gastrointestinal:  Positive for abdominal pain.   Genitourinary: Negative.    Musculoskeletal:  Positive for back pain. Negative for neck pain.   Neurological: Negative.  Negative for headaches.       Positives and Pertinent negatives as per HPI.     SURGICAL HISTORY     Past Surgical History:   Procedure

## 2025-04-07 NOTE — H&P
[facility]  Cardiology Consult    Shwetha Maradiaga  1940    April 7, 2025      Reason for Referral: ACE    CC: ACE      Subjective:     History of Present Illness:    Shwetha Maradiaga is a 84 y.o. patient with a PMH significant for coronary artery disease, diabetes mellitus, hypertension, pulm embolism presented with complaints of chest pain for 2 to 3 days.       Past Medical History:   has a past medical history of Albinoidism, CAD (coronary artery disease), COPD (chronic obstructive pulmonary disease) (HCC), Depression, Diabetes mellitus (HCC), Hearing impaired, Hiatal hernia, Hyperlipidemia, Hypertension, Kidney stone, Malignant melanoma (HCC), Osteoarthritis, Otosclerosis, Pneumonia, Pulmonary embolism without acute cor pulmonale (HCC), and Stented coronary artery.    Surgical History:   has a past surgical history that includes Coronary angioplasty with stent; Skin cancer excision (<2000); knee surgery; Stapedes surgery (1719-1730); skin biopsy; Colonoscopy; Endoscopy, colon, diagnostic; Coronary artery bypass graft (03/17/15); Total abdominal hysterectomy w/ bilateral salpingoophorectomy (1987); Upper gastrointestinal endoscopy (N/A, 10/20/2021); and Upper gastrointestinal endoscopy (N/A, 10/20/2021).     Social History:   reports that she has been smoking cigarettes. She started smoking about 64 years ago. She has a 54.3 pack-year smoking history. She has never used smokeless tobacco. She reports that she does not drink alcohol and does not use drugs.     Family History:  family history includes Cancer in an other family member; Deep Vein Thrombosis in her mother; Esophageal Cancer in her son; Seizures in her daughter.    Home Medications:  Were reviewed and are listed in nursing record and/or below  Prior to Admission medications    Medication Sig Start Date End Date Taking? Authorizing Provider   metoprolol tartrate (LOPRESSOR) 25 MG tablet TAKE HALF TABLET BY MOUTH TWICE DAILY 3/31/25   Michelle

## 2025-04-07 NOTE — PROGRESS NOTES
Chest xray deferred due to emergent line placement. Aroldo PASTOR stated okay to use R neck CVC due to emergent needs for medication

## 2025-04-07 NOTE — CONSULTS
[facility] Pulmonary and Critical Care   Consult Note      Reason for Consult: Pulmonary edema requiring intubation  Requesting Physician: Michael Anna    Subjective:   CHIEF COMPLAINT: Chest pain     HPI: All information was obtained from hospitalist, patient is currently intubated and sedated.  Patient presents with chest pain x 2 days-EKG in ER suggestive of acute STEMI of the lateral wall.  Underwent emergent LHC today-graft from SVG to OM was occluded.  Other 2 grafts of her prior CABG was functional.  No intervention was necessary.  However, patient was excessively short of breath and in distress despite BiPAP and hence decision was made to intubate patient to facilitate diuresis.  Patient is also hypotensive requiring phenylephrine drip.    Prior history of CAD status post CABG in 2015, COPD, DM 2, CKD stage III and obesity       The patient is a 84 y.o. female with significant past medical history of:      Diagnosis Date    Albinoidism     CAD (coronary artery disease)     COPD (chronic obstructive pulmonary disease) (HCC)     Depression 11/27/2017    Diabetes mellitus (HCC)     Hearing impaired     Hiatal hernia     Hyperlipidemia     true allergy to statins    Hypertension     Kidney stone 02/03/2013    Malignant melanoma (HCC)     Osteoarthritis     Otosclerosis     Dr. Cuello    Pneumonia     Pulmonary embolism without acute cor pulmonale (HCC) 2/9/2021    Unprovoked but neg thrombophilia work up.    Stented coronary artery         Past Surgical History:        Procedure Laterality Date    COLONOSCOPY      CORONARY ANGIOPLASTY WITH STENT PLACEMENT      CORONARY ARTERY BYPASS GRAFT  03/17/15    Dr. Medina - x 3 LIMA-LAD, SV-OM2, SV-RCA    ENDOSCOPY, COLON, DIAGNOSTIC      KNEE SURGERY      SKIN BIOPSY      arm,back, upper left arm    SKIN CANCER EXCISION  <2000    Left upper arm;  Melanoma    STAPEDES SURGERY  [id number]    Dr. Khushboo SEVERINO AND BSO (CERVIX REMOVED)  1987    UPPER GASTROINTESTINAL ENDOSCOPY N/A  output.  2D echo awaited.    COPD, by history-no signs of exacerbation at this time.  Continue bronchodilator therapy.    CKD stage II/III, creatinine 1.2.  Monitor urine output on Lasix drip.    Lovenox and Pepcid for prophylaxis.  Critical care team will follow.  Patient is DNR status, based on poor cardiac prognosis.    Sai Camacho MD    Critical care time 32 Min excluding procedures

## 2025-04-07 NOTE — PROGRESS NOTES
Pt to pre/post with audible crackles. Pt with room air saturation of 82% upon arrival. Pt palced on 6L nc with improvement to 92%. Pt placed on NRB with improvement to 100%. Dr. Anna to bedside. Pt HOB raised, pressure dressing to right groin. Respiratory called for breathing tx and BiPap. Xray for stat chest xray.

## 2025-04-07 NOTE — PROGRESS NOTES
ETT retracted by RT to 21cm per Aroldo PASTOR order upon chest xray review, afterwards repeat chest xray reviewed by Aroldo PASTOR at bedside who stated ETT and R neck CVC are both in place.

## 2025-04-07 NOTE — H&P
CC : Abd pain    History Obtained From:  patient, family member - daughters    HISTORY OF PRESENT ILLNESS:    Patient is a 84-year-old female with past medical history of gout, depression, GERD, HTN who presented to the hospital with STEMI.  Patient was family endorsed that she complained of some abdominal pain which got better yesterday.  This morning, she was found down and EMS was called.  She was found to have STEMI.  She was urgently taken to the Cath Lab for possible intervention.    Plan:     -Lasix gtt per cardiology  -Strict I's and O's  -Monitor Cr while diuresing  -Continue vent support    Assessment:   Shwetha Maradiaga is a 84 y.o. female with PMH of gout, depression, GERD, HTN who was admitted with STEMI    STEMI  Patient presented via EMS with STEMI.  Patient was found down at home.  She was taken to the Cath Lab.  She was found to have multivessel CAD on coronary angiogram.  No intervention was performed. CXR after LHC showed pulmonary edema  -Cardiology consulted    Gout  At home, patient is on allopurinol    GERD  At home, patient is on Protonix    HTN  At home, patient is on losartan    Code Status: Limited   FEN: ADULT DIET; Regular; Low Fat/Low Chol/High Fiber/2 gm Na   DVT PPX: []Lovenox, []Heparin, []Coumadin, []Eliquis, []Xarelto, []SCD  DISPO Pending: Vent support    Domingo Niño MD  4/7/2025,  4:55 PM    This note was likely completed using voice recognition technology and may contain unintended errors.     Physical Exam, PMH/PSH and ROS     Physical Exam  Constitutional:       Comments: Intubated and sedated   HENT:      Head: Normocephalic.      Mouth/Throat:      Mouth: Mucous membranes are moist.   Cardiovascular:      Rate and Rhythm: Regular rhythm. Tachycardia present.      Heart sounds: Normal heart sounds.   Pulmonary:      Comments: Mechanical breath sounds  Abdominal:      General: Bowel sounds are normal.   Musculoskeletal:      Right lower leg: No edema.      Left lower leg:  home    Family History:       Problem Relation Age of Onset    Deep Vein Thrombosis Mother     Esophageal Cancer Son     Cancer Other         very strong in family    Seizures Daughter        DATA:    Labs:  CBC:   Recent Labs     04/07/25  1313   WBC 16.1*   HGB 12.6   HCT 38.1          BMP:   Recent Labs     04/07/25  1313      K 4.0      CO2 20*   BUN 20   CREATININE 1.2   GLUCOSE 114*     LFT's:   Recent Labs     04/07/25  1313   *   ALT 23   BILITOT 0.5   ALKPHOS 127     Troponin: No results for input(s): \"TROPONINI\" in the last 72 hours.  BNP:No results for input(s): \"BNP\" in the last 72 hours.  ABGs: No results for input(s): \"PHART\", \"FOT8DCZ\", \"PO2ART\" in the last 72 hours.  INR:   Recent Labs     04/07/25  1314   INR 1.01       U/A:No results for input(s): \"NITRITE\", \"COLORU\", \"PHUR\", \"LABCAST\", \"WBCUA\", \"RBCUA\", \"MUCUS\", \"TRICHOMONAS\", \"YEAST\", \"BACTERIA\", \"CLARITYU\", \"SPECGRAV\", \"LEUKOCYTESUR\", \"UROBILINOGEN\", \"BILIRUBINUR\", \"BLOODU\", \"GLUCOSEU\", \"AMORPHOUS\" in the last 72 hours.    Invalid input(s): \"KETONESU\"    XR CHEST PORTABLE   Final Result   No acute cardiopulmonary findings         CT CERVICAL SPINE WO CONTRAST   Final Result   No acute intracranial abnormality.Chronic microvascular disease with   generalized cerebral volume loss.      No acute fracture in the cervical spine.         CT HEAD WO CONTRAST   Final Result   No acute intracranial abnormality.Chronic microvascular disease with   generalized cerebral volume loss.      No acute fracture in the cervical spine.         CTA CHEST ABDOMEN PELVIS W CONTRAST   Preliminary Result   1. No evidence of thoracic or abdominal aortic aneurysm or dissection.   2. Small left pleural effusion with minimal dependent left lower lobe   atelectasis.   3. Severe focal stenosis of the left renal artery origin..   4. Cholelithiasis.         XR CHEST PORTABLE    (Results Pending)   XR CHEST PORTABLE    (Results Pending)

## 2025-04-07 NOTE — PROGRESS NOTES
60 of IV lasix and lindo placed in cath lab. Unable to obtain stat ABG. EKG completed reading STEMI.   On arrival from cath lab patient placed on bipap for elevated RR, low oxygenation  Stat echo reviewed by wilfrido PASTOR at bedside who recommended intubation after reviewing echo and stat chest xray from cath lab which suggested pulmonary edema. RR 35 and .  Family spoke to Wilfrido PASTOR at bedside to discuss code status, agreed to intubation and medications, no compressions and no shocks. No cardiac assist devices.  Emergent intubation ETT 24cm at lip positive color change at 1556   Levo started at 5- increased to 10  1610 - ETT retracted to 23cm at lip d/t poor oxygenation with improvement in spo2 from 81% to 96%  R IJ placed by Aroldo PASTOR at bedside  ETT retracted to 21cm at lip after review of chest xray. Repeat chest xray reviewed and approved by Aroldo PASTOR.   R neck CVC placed by Aroldo PASTOR  L groin arterial line placed by Sai PASTOR

## 2025-04-08 VITALS
OXYGEN SATURATION: 69 % | HEIGHT: 60 IN | HEART RATE: 67 BPM | SYSTOLIC BLOOD PRESSURE: 124 MMHG | TEMPERATURE: 97.1 F | BODY MASS INDEX: 37.3 KG/M2 | WEIGHT: 190 LBS | DIASTOLIC BLOOD PRESSURE: 63 MMHG | RESPIRATION RATE: 26 BRPM

## 2025-04-08 NOTE — PROGRESS NOTES
MD Manny Bacon MD Aldo Estella, DO              Office: [phone]                      Fax: [phone]               [email or web address]                     Nephrology consult received. Full consult report will follow.     Thank you for allowing us to participate in this patient's care. Please do not hesitate to contact us anytime. We will follow along with you.         Dejan Rasheed DO

## 2025-04-08 NOTE — PROGRESS NOTES
04/07/25 2014   Patient Observation   Pulse 100   Respirations 24   SpO2 93 %   Breath Sounds   Respiratory Pattern Tachypneic   Breath Sounds Bilateral Diminished   Vent Information   Ventilator Day(s) 1   Ventilator ID [id number]   Vent Mode AC/PRVC   Ventilator Settings   Vt (Set, mL) 400 mL   Resp Rate (Set) 24 bpm   PEEP/CPAP (cmH2O) 5   FiO2  100 %   Insp Time (sec) 0.7 sec   Vent Patient Data (Readings)   Vt (Measured) 413 mL   Peak Inspiratory Pressure (cmH2O) 25 cmH2O   Rate Measured 24 br/min   Minute Volume (L/min) 9.92 Liters   Mean Airway Pressure (cmH2O) 10 cmH20   I:E Ratio 1:2.6   Backup Apnea On   Backup Rate 24 Breaths Per Minute   Backup Vt 400   Vent Alarm Settings   High Pressure (cmH2O) 40 cmH2O   Low Minute Volume (lpm) 2 L/min   High Minute Volume (lpm) 20 L/min   Low Exhaled Vt (ml) 200 mL   High Exhaled Vt (ml) 1000 mL   RR High (bpm) 40 br/min   Additional Respiratoray Assessments   Humidification Source HME   Circuit Condensation Drained   Ambu Bag With Mask At Bedside Yes   Airway Clearance   Suction ET Tube   Subglottic Suction Done Yes   Suction Device Francis;Inline suction catheter   Sputum Method Obtained Endotracheal   Sputum Amount Moderate   Sputum Color/Odor Bloody   Sputum Consistency Thick;Thin   ETT    Placement Date/Time: 04/07/25 1556   Present on Admission/Arrival: No  Placed By: Licensed provider  Placement Verified By: Auscultation;Colorimetric ETCO2 device;Chest X-ray  Preoxygenation: Yes  Mask Ventilation: Ventilated by mask (1)  Technique: V...   Secured At 21 cm   Measured From Lips   ETT Placement Center   Secured By Commercial tube [email or web address]   Site Assessment Dry   Ventilator Associated Pneumonia Bundle   Elevation of Head of Bed to 30-45 Degrees  Yes   Oral Care Performed Mouthwash with chlorhexidine   Oral Suctioning Yes   ETT/Trach Suctioning Yes

## 2025-04-08 NOTE — PROCEDURES
PROCEDURE NOTE  Date: 4/7/2025   Name: Shwetha Maradiaga  YOB: 1940    CENTRAL LINE    Date/Time: 4/7/2025 9:36 PM    Performed by: Jonathan Kendrick MD  Authorized by: Jonathan Kendrick MD  Consent: Verbal consent obtained.  Risks and benefits: risks, benefits and alternatives were discussed  Consent given by: power of   Patient identity confirmed: arm band  Indications: vascular access    Anesthesia:  Local Anesthetic: lidocaine 1% without epinephrine  Location details: right internal jugular  Catheter type: triple lumen  Catheter size: 7 Fr  Ultrasound guidance: yes  Sterile ultrasound techniques: sterile gel and sterile probe covers were used  Number of attempts: 2  Successful placement: yes  Post-procedure: line sutured  Assessment: blood return through all ports, placement verified by x-ray and no pneumothorax on x-ray  Patient tolerance: patient tolerated the procedure well with no immediate complications

## 2025-04-08 NOTE — DEATH NOTES
Death Pronouncement Note  Patient's Name: Shwetha Maradiaga   Patient's YOB: 1940  MRN Number: [id number]    Admitting Provider: Michael Anna MD  Attending Provider: Michael Anna MD    Patient was examined and the following were absent: Pulses, Blood Pressure, and Respiratory effort    I declared the patient dead on 4/8/2025  at 0124    Preliminary Cause of Death: Myocardial infarction.    Electronically signed by Jeb Guevara MD on 4/8/25 at 2:03 AM EDT

## 2025-04-08 NOTE — PROGRESS NOTES
Pt maxed out on Silvano-Synephrine gtt @300mcg/min. Spoke w family present at bedside and they do not wish to add any further pressors at this time. All questions answered

## 2025-04-08 NOTE — PROCEDURES
PROCEDURE NOTE  Date: 4/7/2025   Name: Shwetha Maradiaga  YOB: 1940    Intubation    Date/Time: 4/7/2025 9:32 PM    Performed by: Jonathan Kendrick MD  Authorized by: Jonathan Kendrick MD  Consent: The procedure was performed in an emergent situation. Verbal consent obtained.  Consent given by: power of   Patient identity confirmed: arm band  Time out: Immediately prior to procedure a \"time out\" was called to verify the correct patient, procedure, equipment, support staff and site/side marked as required.  Indications: respiratory failure and respiratory distress  Intubation method: fiberoptic oral  Patient status: sedated  Sedatives: etomidate  Paralytic: rocuronium  Laryngoscope size: Mac 4  Tube size: 7.5 mm  Tube type: cuffed  Number of attempts: 1  Cricoid pressure: no  Cords visualized: yes  Post-procedure assessment: chest rise and CO2 detector  Breath sounds: reduced on left  Cuff inflated: yes  ETT to lip: 24 cm  Tube secured with: ETT [email or web address]  Chest x-ray interpreted by me.  Chest x-ray findings: endotracheal tube too low  Tube repositioned: tube repositioned successfully  Patient tolerance: patient tolerated the procedure well with no immediate complications

## 2025-04-08 NOTE — PROGRESS NOTES
Pt BP slowly declining and SBP now in the 60s. Spoke w family at bedside and they would like to hold the course as is - they do not want to add more pressors and do not want to withdraw at this time. Hospitalist on call made aware. All questions answered.

## 2025-04-08 NOTE — SIGNIFICANT EVENT
Called to bedside by registered nurse that patient has  and need to be pronounced.  Patient's death had been verified by 2 nurses.  Was at the bedside and pronounced patient dead on 2025 at 0124 consistent with when nurses realized that patient was dead.  
None

## 2025-04-09 LAB
ECHO BSA: 1.91 M2
ECHO LV EDV A2C: 162 ML
ECHO LV EDV A4C: 165 ML
ECHO LV EDV INDEX A4C: 90 ML/M2
ECHO LV EDV NDEX A2C: 89 ML/M2
ECHO LV EF PHYSICIAN: 10 %
ECHO LV EJECTION FRACTION A2C: 11 %
ECHO LV EJECTION FRACTION A4C: 13 %
ECHO LV EJECTION FRACTION BIPLANE: 12 % (ref 55–100)
ECHO LV ESV A2C: 144 ML
ECHO LV ESV A4C: 144 ML
ECHO LV ESV INDEX A2C: 79 ML/M2
ECHO LV ESV INDEX A4C: 79 ML/M2
ECHO LV FRACTIONAL SHORTENING: 38 % (ref 28–44)
ECHO LV INTERNAL DIMENSION DIASTOLE INDEX: 2.19 CM/M2
ECHO LV INTERNAL DIMENSION DIASTOLIC: 4 CM (ref 3.9–5.3)
ECHO LV INTERNAL DIMENSION SYSTOLIC INDEX: 1.37 CM/M2
ECHO LV INTERNAL DIMENSION SYSTOLIC: 2.5 CM
ECHO LV IVSD: 1.5 CM (ref 0.6–0.9)
ECHO LV MASS 2D: 232.7 G (ref 67–162)
ECHO LV MASS INDEX 2D: 127.2 G/M2 (ref 43–95)
ECHO LV POSTERIOR WALL DIASTOLIC: 1.5 CM (ref 0.6–0.9)
ECHO LV RELATIVE WALL THICKNESS RATIO: 0.75
EKG ATRIAL RATE: 104 BPM
EKG ATRIAL RATE: 134 BPM
EKG DIAGNOSIS: NORMAL
EKG DIAGNOSIS: NORMAL
EKG P AXIS: 61 DEGREES
EKG P-R INTERVAL: 184 MS
EKG P-R INTERVAL: 186 MS
EKG Q-T INTERVAL: 316 MS
EKG Q-T INTERVAL: 370 MS
EKG QRS DURATION: 106 MS
EKG QRS DURATION: 118 MS
EKG QTC CALCULATION (BAZETT): 471 MS
EKG QTC CALCULATION (BAZETT): 486 MS
EKG R AXIS: -70 DEGREES
EKG R AXIS: -71 DEGREES
EKG T AXIS: 22 DEGREES
EKG T AXIS: 65 DEGREES
EKG VENTRICULAR RATE: 104 BPM
EKG VENTRICULAR RATE: 134 BPM

## 2025-04-09 PROCEDURE — 93010 ELECTROCARDIOGRAM REPORT: CPT | Performed by: INTERNAL MEDICINE

## 2025-04-09 PROCEDURE — 93308 TTE F-UP OR LMTD: CPT | Performed by: INTERNAL MEDICINE

## 2025-04-09 PROCEDURE — 93325 DOPPLER ECHO COLOR FLOW MAPG: CPT | Performed by: INTERNAL MEDICINE

## 2025-04-09 NOTE — DISCHARGE SUMMARY
[facility]    DISCHARGE SUMMARY      Patient ID:  Shwetha Maradiaga  [id number] 84 y.o. 1940    Admit date: 4/7/2025    Discharge date: 4/8/2025     Admitting Physician: Michael Anna MD     Discharge MD: Michael Anna MD     Admission Diagnoses: STEMI (ST elevation myocardial infarction) (HCC) [I21.3]  ST elevation myocardial infarction (STEMI), unspecified artery (HCC) [I21.3]  Acute myocardial infarction involving left circumflex coronary artery, unspecified MI type (HCC) [I21.21]    Discharge Diagnoses:   Patient Active Problem List   Diagnosis    Hypercholesteremia    Albinoidism (HCC)    Coronary artery disease due to lipid rich plaque    Hearing impaired    Vertigo    GERD (gastroesophageal reflux disease)    Intertrigo    Abnormal EKG    Essential hypertension    History of melanoma    Tobacco dependence    Sacroiliac pain    Chronic rhinitis    B12 deficiency    Blepharitis of lower eyelids of both eyes    COPD, mild (HCC)    Recurrent major depression in remission    Ganglion cyst of volar aspect of left wrist    Stage 3b chronic kidney disease (HCC)    Hyperuricemia    Atherosclerosis of both carotid arteries    Mild mitral regurgitation  2014     Esophageal dysphagia    Type 2 diabetes mellitus with chronic kidney disease    Intrinsic eczema    History of pulmonary embolism    Senile debility    Frequent falls    Onychomycosis    Obesity, morbid    STEMI (ST elevation myocardial infarction) (HCC)    Acute myocardial infarction involving left circumflex coronary artery, unspecified MI type (HCC)    Coronary artery disease involving coronary bypass graft of native heart with refractory angina pectoris    Acute pulmonary edema (HCC)    Acute respiratory failure with hypoxia          Hospital Course: Shwetha Maradiaga was admitted with acute MI and cardiogenic shock.  Severe lactic acidosis   Severe CMP with acute systolic heart failure.       At discharge,     Consults: IP CONSULT TO CARDIOLOGY  IP  CONSULT TO HOSPITALIST  IP CONSULT TO CARDIOLOGY  IP CONSULT TO CARDIAC REHAB  IP CONSULT TO CARDIAC REHAB  IP CONSULT TO NEPHROLOGY        Objective:     /63   Pulse 67   Temp 97.1 °F (36.2 °C) (Esophageal)   Resp 26   Ht 1.524 m (5')   Wt 86.2 kg (190 lb)   SpO2 (!) 69%   BMI 37.11 kg/m²    No intake or output data in the 24 hours ending 25    Physical Exam:  General:  Awake, alert, NAD  Skin:  Warm and dry  Neck:  JVD<8, no bruit  Chest:  Clear to auscultation, no wheezes/rhonchi/rales  Cardiovascular:  RRR S1S2  Abdomen:  Soft, nontender, +bowel sounds      Significant Diagnostic Studies:     Echocardiography:     Stress test:     Labs:   Lab Results   Component Value Date    CREATININE 1.2 2025    BUN 20 2025     2025    K 4.0 2025     2025    CO2 20 (L) 2025      Lab Results   Component Value Date    WBC 16.1 (H) 2025    HGB 12.6 2025    HCT 38.1 2025    MCV 89.9 2025     2025      Lab Results   Component Value Date    INR 1.01 2025    PROTIME 13.5 2025    No results found for: \"BNP\"      Disposition:         1. Patient     Signed:  Michael Anna MD, MPH  Time spent on discharge of patient: 45 minutes
